# Patient Record
Sex: FEMALE | Race: WHITE | NOT HISPANIC OR LATINO | Employment: OTHER | URBAN - METROPOLITAN AREA
[De-identification: names, ages, dates, MRNs, and addresses within clinical notes are randomized per-mention and may not be internally consistent; named-entity substitution may affect disease eponyms.]

---

## 2023-02-05 PROBLEM — R94.31 ABNORMAL EKG: Status: ACTIVE | Noted: 2023-02-05

## 2023-02-05 PROBLEM — R60.9 EDEMA: Status: ACTIVE | Noted: 2023-02-05

## 2023-02-05 PROBLEM — E78.5 HYPERLIPIDEMIA: Status: ACTIVE | Noted: 2023-02-05

## 2023-02-05 PROBLEM — M25.561 RIGHT KNEE PAIN: Status: ACTIVE | Noted: 2023-02-05

## 2023-02-05 PROBLEM — K63.9 COLON WALL THICKENING: Status: ACTIVE | Noted: 2023-02-05

## 2023-02-05 PROBLEM — N18.31 CKD STAGE G3A/A1, GFR 45-59 AND ALBUMIN CREATININE RATIO <30 MG/G (MULTI): Status: ACTIVE | Noted: 2023-02-05

## 2023-02-05 PROBLEM — G89.29 CHRONIC LOW BACK PAIN: Status: ACTIVE | Noted: 2023-02-05

## 2023-02-05 PROBLEM — E55.9 VITAMIN D INSUFFICIENCY: Status: ACTIVE | Noted: 2023-02-05

## 2023-02-05 PROBLEM — L30.9 ECZEMA: Status: ACTIVE | Noted: 2023-02-05

## 2023-02-05 PROBLEM — J06.9 ACUTE URI: Status: ACTIVE | Noted: 2023-02-05

## 2023-02-05 PROBLEM — B19.20 HEPATITIS-C: Status: ACTIVE | Noted: 2023-02-05

## 2023-02-05 PROBLEM — M23.91 INTERNAL DERANGEMENT OF RIGHT KNEE: Status: ACTIVE | Noted: 2023-02-05

## 2023-02-05 PROBLEM — M17.11 PRIMARY OSTEOARTHRITIS OF RIGHT KNEE: Status: ACTIVE | Noted: 2023-02-05

## 2023-02-05 PROBLEM — S89.91XA INJURY OF RIGHT KNEE: Status: ACTIVE | Noted: 2023-02-05

## 2023-02-05 PROBLEM — M54.50 CHRONIC LOW BACK PAIN: Status: ACTIVE | Noted: 2023-02-05

## 2023-02-05 PROBLEM — C44.91 BASAL CELL CARCINOMA OF SKIN: Status: ACTIVE | Noted: 2023-02-05

## 2023-02-05 PROBLEM — J30.2 SEASONAL ALLERGIES: Status: ACTIVE | Noted: 2023-02-05

## 2023-02-05 PROBLEM — M19.041 ARTHRITIS OF HAND, RIGHT: Status: ACTIVE | Noted: 2023-02-05

## 2023-02-05 PROBLEM — I10 HYPERTENSION: Status: ACTIVE | Noted: 2023-02-05

## 2023-02-05 PROBLEM — M17.9 DJD (DEGENERATIVE JOINT DISEASE) OF KNEE: Status: ACTIVE | Noted: 2023-02-05

## 2023-02-05 PROBLEM — R05.9 COUGH: Status: ACTIVE | Noted: 2023-02-05

## 2023-02-05 RX ORDER — IBUPROFEN 600 MG/1
1 TABLET ORAL 3 TIMES DAILY PRN
COMMUNITY
Start: 2022-08-18 | End: 2023-11-21 | Stop reason: ALTCHOICE

## 2023-02-05 RX ORDER — CHOLECALCIFEROL (VITAMIN D3) 125 MCG
2 CAPSULE ORAL DAILY
COMMUNITY

## 2023-02-05 RX ORDER — AMLODIPINE BESYLATE 5 MG/1
1 TABLET ORAL DAILY
COMMUNITY
End: 2023-12-21 | Stop reason: SDUPTHER

## 2023-02-05 RX ORDER — GLUCOSA SU 2KCL/CHONDROITIN SU 500-400 MG
1 CAPSULE ORAL DAILY
COMMUNITY

## 2023-02-05 RX ORDER — CALCIUM CARBONATE 600 MG
1 TABLET ORAL DAILY
COMMUNITY

## 2023-02-05 RX ORDER — MULTIVITAMIN
1 TABLET ORAL DAILY
COMMUNITY
End: 2023-11-21 | Stop reason: ALTCHOICE

## 2023-02-05 RX ORDER — LISINOPRIL 20 MG/1
1 TABLET ORAL DAILY
COMMUNITY
End: 2023-03-27 | Stop reason: ALTCHOICE

## 2023-03-27 ENCOUNTER — OFFICE VISIT (OUTPATIENT)
Dept: PRIMARY CARE | Facility: CLINIC | Age: 78
End: 2023-03-27
Payer: COMMERCIAL

## 2023-03-27 VITALS
WEIGHT: 233.8 LBS | HEART RATE: 72 BPM | DIASTOLIC BLOOD PRESSURE: 82 MMHG | HEIGHT: 66 IN | BODY MASS INDEX: 37.57 KG/M2 | TEMPERATURE: 97.6 F | SYSTOLIC BLOOD PRESSURE: 150 MMHG

## 2023-03-27 DIAGNOSIS — Z12.31 ENCOUNTER FOR SCREENING MAMMOGRAM FOR BREAST CANCER: ICD-10-CM

## 2023-03-27 DIAGNOSIS — Z78.0 ASYMPTOMATIC MENOPAUSAL STATE: ICD-10-CM

## 2023-03-27 DIAGNOSIS — S89.91XS INJURY OF RIGHT KNEE, SEQUELA: ICD-10-CM

## 2023-03-27 DIAGNOSIS — Z00.00 ROUTINE GENERAL MEDICAL EXAMINATION AT HEALTH CARE FACILITY: Primary | ICD-10-CM

## 2023-03-27 DIAGNOSIS — I10 HYPERTENSION, UNSPECIFIED TYPE: ICD-10-CM

## 2023-03-27 PROBLEM — R53.81 PHYSICAL DEBILITY: Status: ACTIVE | Noted: 2023-03-27

## 2023-03-27 PROCEDURE — G0439 PPPS, SUBSEQ VISIT: HCPCS | Performed by: FAMILY MEDICINE

## 2023-03-27 PROCEDURE — 1036F TOBACCO NON-USER: CPT | Performed by: FAMILY MEDICINE

## 2023-03-27 PROCEDURE — 3079F DIAST BP 80-89 MM HG: CPT | Performed by: FAMILY MEDICINE

## 2023-03-27 PROCEDURE — 3077F SYST BP >= 140 MM HG: CPT | Performed by: FAMILY MEDICINE

## 2023-03-27 PROCEDURE — 1157F ADVNC CARE PLAN IN RCRD: CPT | Performed by: FAMILY MEDICINE

## 2023-03-27 PROCEDURE — 1160F RVW MEDS BY RX/DR IN RCRD: CPT | Performed by: FAMILY MEDICINE

## 2023-03-27 PROCEDURE — 1170F FXNL STATUS ASSESSED: CPT | Performed by: FAMILY MEDICINE

## 2023-03-27 PROCEDURE — 1159F MED LIST DOCD IN RCRD: CPT | Performed by: FAMILY MEDICINE

## 2023-03-27 RX ORDER — ETODOLAC 600 MG/1
600 TABLET, EXTENDED RELEASE ORAL DAILY
Qty: 14 TABLET | Refills: 0 | Status: SHIPPED | OUTPATIENT
Start: 2023-03-27 | End: 2023-04-10

## 2023-03-27 RX ORDER — LISINOPRIL 40 MG/1
40 TABLET ORAL DAILY
Qty: 90 TABLET | Refills: 3 | Status: SHIPPED | OUTPATIENT
Start: 2023-03-27 | End: 2023-12-28 | Stop reason: SDUPTHER

## 2023-03-27 ASSESSMENT — ACTIVITIES OF DAILY LIVING (ADL)
HEARING - LEFT EAR: FUNCTIONAL
BATHING: INDEPENDENT
WALKS IN HOME: INDEPENDENT
TOILETING: INDEPENDENT
PATIENT'S MEMORY ADEQUATE TO SAFELY COMPLETE DAILY ACTIVITIES?: YES
HEARING - RIGHT EAR: FUNCTIONAL
JUDGMENT_ADEQUATE_SAFELY_COMPLETE_DAILY_ACTIVITIES: YES
FEEDING YOURSELF: INDEPENDENT
DRESSING YOURSELF: INDEPENDENT
GROOMING: INDEPENDENT

## 2023-03-27 ASSESSMENT — PATIENT HEALTH QUESTIONNAIRE - PHQ9
2. FEELING DOWN, DEPRESSED OR HOPELESS: NOT AT ALL
1. LITTLE INTEREST OR PLEASURE IN DOING THINGS: NOT AT ALL
SUM OF ALL RESPONSES TO PHQ9 QUESTIONS 1 AND 2: 0

## 2023-03-27 NOTE — PROGRESS NOTES
"  Subjective   Reason for Visit: Dora Guillermo is an 78 y.o. female here for a Medicare Wellness visit.     Past Medical, Surgical, and Family History reviewed and updated in chart.    Reviewed all medications by prescribing practitioner or clinical pharmacist (such as prescriptions, OTCs, herbal therapies and supplements) and documented in the medical record.    End of February injured right knee, didn't fall, was doing ok until th night, walked a lot and bad pain w weight on it  Has apt w/ dr garcia Thursday    Will check at [employer] to see if tdap is covered        Patient Care Team:  Bell Martin DO as PCP - General  Bell Martin,  as PCP - [employer]vantage PCP  Bell Martin DO as PCP - [employer]vantage PCP         Objective   Vitals:  /82   Pulse 72   Temp 36.4 °C (97.6 °F)   Ht 1.676 m (5' 6\")   Wt 106 kg (233 lb 12.8 oz)   BMI 37.74 kg/m²       Physical Exam  Vitals reviewed.   Constitutional:       General: She is not in acute distress.     Appearance: Normal appearance.   HENT:      Head: Normocephalic and atraumatic.   Eyes:      General: No scleral icterus.     Conjunctiva/sclera: Conjunctivae normal.   Cardiovascular:      Rate and Rhythm: Normal rate and regular rhythm.      Heart sounds: No murmur heard.  Pulmonary:      Effort: Pulmonary effort is normal.      Breath sounds: No wheezing, rhonchi or rales.   Abdominal:      General: Bowel sounds are normal.      Palpations: Abdomen is soft.      Tenderness: There is no abdominal tenderness.   Musculoskeletal:      Right lower leg: No edema.      Left lower leg: No edema.   Skin:     General: Skin is warm and dry.      Findings: No rash.   Neurological:      General: No focal deficit present.      Mental Status: She is alert.      Gait: Gait abnormal.      Comments: Ambulating with a walker   Psychiatric:         Mood and Affect: Mood normal.         Behavior: Behavior normal.         Assessment/Plan "   Problem List Items Addressed This Visit          Circulatory    Hypertension    Relevant Medications    lisinopril 40 mg tablet       Musculoskeletal    Injury of right knee    Relevant Medications    etodolac XL (Lodine XL) 600 mg 24 hr tablet     Other Visit Diagnoses       Routine general medical examination at health care facility    -  Primary    Relevant Orders    1 Year Follow Up In [facility] - PCP - Wellness Exam    Asymptomatic menopausal state        Relevant Orders    XR DEXA bone density    Encounter for screening mammogram for breast cancer        Relevant Orders    BI mammo bilateral screening tomosynthesis

## 2023-03-27 NOTE — PROGRESS NOTES
"Subjective   Reason for Visit: Dora Guillermo is an 78 y.o. female here for a Medicare Wellness visit.     Past Medical, Surgical, and Family History reviewed and updated in chart.    Reviewed all medications by prescribing practitioner or clinical pharmacist (such as prescriptions, OTCs, herbal therapies and supplements) and documented in the medical record.    HPI    Patient Care Team:  Bell Martin DO as PCP - General  Bell Martin DO as PCP - [employer]vantage PCP  Bell Martin DO as PCP - [employer] PCP     Review of Systems    Objective   Vitals:  /82   Pulse 72   Temp 36.4 °C (97.6 °F)   Ht 1.676 m (5' 6\")   Wt 106 kg (233 lb 12.8 oz)   BMI 37.74 kg/m²       Physical Exam    Assessment/Plan   Problem List Items Addressed This Visit        Circulatory    Hypertension    Relevant Medications    lisinopril 40 mg tablet       Musculoskeletal    Injury of right knee    Relevant Medications    etodolac XL (Lodine XL) 600 mg 24 hr tablet   Other Visit Diagnoses     Routine general medical examination at health care facility    -  Primary    Relevant Orders    1 Year Follow Up In [facility] - PCP - Wellness Exam    1 Year Follow Up In [facility] - PCP - Wellness Exam    Asymptomatic menopausal state        Relevant Orders    XR DEXA bone density    Encounter for screening mammogram for breast cancer        Relevant Orders    BI mammo bilateral screening tomosynthesis             "

## 2023-04-06 ENCOUNTER — TELEPHONE (OUTPATIENT)
Dept: PRIMARY CARE | Facility: CLINIC | Age: 78
End: 2023-04-06
Payer: COMMERCIAL

## 2023-04-06 NOTE — TELEPHONE ENCOUNTER
Pt said that she recently had a mammogram and she is scheduled for her bone density next week but she is also going to be having an mri on her knee before surgery.  She is concerned that this is too much radiation and isnt sure if she should reschedule her bone density. Please advise.

## 2023-04-20 ENCOUNTER — TELEPHONE (OUTPATIENT)
Dept: PRIMARY CARE | Facility: CLINIC | Age: 78
End: 2023-04-20

## 2023-04-20 ENCOUNTER — OFFICE VISIT (OUTPATIENT)
Dept: PRIMARY CARE | Facility: CLINIC | Age: 78
End: 2023-04-20
Payer: COMMERCIAL

## 2023-04-20 VITALS
TEMPERATURE: 97.8 F | BODY MASS INDEX: 39 KG/M2 | HEART RATE: 78 BPM | WEIGHT: 241.6 LBS | DIASTOLIC BLOOD PRESSURE: 79 MMHG | SYSTOLIC BLOOD PRESSURE: 131 MMHG

## 2023-04-20 DIAGNOSIS — M17.11 OSTEOARTHRITIS OF RIGHT KNEE, UNSPECIFIED OSTEOARTHRITIS TYPE: ICD-10-CM

## 2023-04-20 DIAGNOSIS — Z01.818 PRE-OP EVALUATION: ICD-10-CM

## 2023-04-20 DIAGNOSIS — N18.31 CKD STAGE G3A/A1, GFR 45-59 AND ALBUMIN CREATININE RATIO <30 MG/G (MULTI): ICD-10-CM

## 2023-04-20 DIAGNOSIS — I10 PRIMARY HYPERTENSION: Primary | ICD-10-CM

## 2023-04-20 PROCEDURE — 1036F TOBACCO NON-USER: CPT | Performed by: FAMILY MEDICINE

## 2023-04-20 PROCEDURE — 1159F MED LIST DOCD IN RCRD: CPT | Performed by: FAMILY MEDICINE

## 2023-04-20 PROCEDURE — 3078F DIAST BP <80 MM HG: CPT | Performed by: FAMILY MEDICINE

## 2023-04-20 PROCEDURE — 1157F ADVNC CARE PLAN IN RCRD: CPT | Performed by: FAMILY MEDICINE

## 2023-04-20 PROCEDURE — 1160F RVW MEDS BY RX/DR IN RCRD: CPT | Performed by: FAMILY MEDICINE

## 2023-04-20 PROCEDURE — 99214 OFFICE O/P EST MOD 30 MIN: CPT | Performed by: FAMILY MEDICINE

## 2023-04-20 PROCEDURE — 3075F SYST BP GE 130 - 139MM HG: CPT | Performed by: FAMILY MEDICINE

## 2023-04-20 NOTE — TELEPHONE ENCOUNTER
----- Message from Bell Martin DO sent at 4/19/2023  4:47 PM EDT -----  Bone density shows osteopenia. This means there is definitely bone thinning but not bad enough hat you need a prescription medication for it yet. Can prevent this from getting worse by increasing physical activity and taking a calcium and vitamin D supplement if you are not already doing so.  Thanks,  Bell Martin      ----- Message -----  From: Mikayla [facility] - Radiology Results In  Sent: 4/14/2023   2:28 PM EDT  To: Bell Martin DO

## 2023-04-20 NOTE — PROGRESS NOTES
Pre-Op Evaluation  Dora Guillermo is a 78 y.o. female who presents to the office today for a preoperative consultation at the request of surgeon dr zamarripa who plans on performing right total knee arthroplasty on April 26. This consultation is requested for the specific conditions prompting preoperative evaluation (i.e. because of potential affect on operative risk): HTN, CKD. Planned anesthesia is general. The patient has the following known anesthesia issues:  none . Patient has a bleeding risk of: no recent abnormal bleeding. Patient does not have objections to receiving blood products if needed.    The following portions of the chart were reviewed this encounter and updated as appropriate:  Problems     Subjective    Dora Guillermo is a 78 y.o. female who presents for Pre-op Clearance (Clearance for surgery - r knee - total knee rpl/April 26th/Dr garcia).    Has had surgery, no hx of complications, no vomiting related to anesthesia  Nauseous with morphine  Recovering at home, help at home,   Ramp into home  Dtr and family live in next door home  In home PT planned or outpatient  Stopped naproxen 3 days ago  Taking otc tylenol         Objective   /79   Pulse 78   Temp 36.6 °C (97.8 °F)   Wt 110 kg (241 lb 9.6 oz)   BMI 39.00 kg/m²   See 4/13/labs  Physical Exam  Vitals reviewed.   Constitutional:       General: She is not in acute distress.     Appearance: Normal appearance.   HENT:      Head: Normocephalic and atraumatic.   Eyes:      General: No scleral icterus.     Conjunctiva/sclera: Conjunctivae normal.   Cardiovascular:      Rate and Rhythm: Normal rate and regular rhythm.      Heart sounds: No murmur heard.  Pulmonary:      Effort: Pulmonary effort is normal.      Breath sounds: No wheezing, rhonchi or rales.   Abdominal:      General: Bowel sounds are normal.      Palpations: Abdomen is soft.      Tenderness: There is no abdominal tenderness.   Musculoskeletal:      Right lower leg: No edema.       Left lower leg: No edema.   Skin:     General: Skin is warm and dry.      Findings: No rash.   Neurological:      General: No focal deficit present.      Mental Status: She is alert.      Gait: Gait normal.   Psychiatric:         Mood and Affect: Mood normal.         Behavior: Behavior normal.     Ambulating with wheeled walker    Assessment/Plan   Problem List Items Addressed This Visit          Circulatory    Hypertension - Primary       Genitourinary    CKD stage G3a/A1, GFR 45-59 and albumin creatinine ratio <30 mg/g       Musculoskeletal    DJD (degenerative joint disease) of knee     Other Visit Diagnoses       Pre-op evaluation            Acceptable/ Low surgical risk.  Patient advised to follow up as needed or if any concerns arise.

## 2023-04-25 LAB — SARS-COV-2 RESULT: NOT DETECTED

## 2023-04-26 ENCOUNTER — HOSPITAL ENCOUNTER (OUTPATIENT)
Dept: DATA CONVERSION | Facility: HOSPITAL | Age: 78
End: 2023-04-27
Attending: ORTHOPAEDIC SURGERY | Admitting: ORTHOPAEDIC SURGERY
Payer: COMMERCIAL

## 2023-04-26 DIAGNOSIS — E78.5 HYPERLIPIDEMIA, UNSPECIFIED: ICD-10-CM

## 2023-04-26 DIAGNOSIS — N18.9 CHRONIC KIDNEY DISEASE, UNSPECIFIED: ICD-10-CM

## 2023-04-26 DIAGNOSIS — Z86.19 PERSONAL HISTORY OF OTHER INFECTIOUS AND PARASITIC DISEASES: ICD-10-CM

## 2023-04-26 DIAGNOSIS — M17.11 UNILATERAL PRIMARY OSTEOARTHRITIS, RIGHT KNEE: ICD-10-CM

## 2023-04-26 DIAGNOSIS — J44.9 CHRONIC OBSTRUCTIVE PULMONARY DISEASE, UNSPECIFIED (MULTI): ICD-10-CM

## 2023-04-26 DIAGNOSIS — Z20.822 CONTACT WITH AND (SUSPECTED) EXPOSURE TO COVID-19: ICD-10-CM

## 2023-04-26 DIAGNOSIS — I12.9 HYPERTENSIVE CHRONIC KIDNEY DISEASE WITH STAGE 1 THROUGH STAGE 4 CHRONIC KIDNEY DISEASE, OR UNSPECIFIED CHRONIC KIDNEY DISEASE: ICD-10-CM

## 2023-04-26 DIAGNOSIS — M17.9 OSTEOARTHRITIS OF KNEE, UNSPECIFIED: ICD-10-CM

## 2023-04-27 LAB
BASOPHILS (10*3/UL) IN BLOOD BY AUTOMATED COUNT: 0.01 X10E9/L (ref 0–0.1)
BASOPHILS/100 LEUKOCYTES IN BLOOD BY AUTOMATED COUNT: 0.1 % (ref 0–2)
EOSINOPHILS (10*3/UL) IN BLOOD BY AUTOMATED COUNT: 0.02 X10E9/L (ref 0–0.4)
EOSINOPHILS/100 LEUKOCYTES IN BLOOD BY AUTOMATED COUNT: 0.2 % (ref 0–6)
ERYTHROCYTE DISTRIBUTION WIDTH (RATIO) BY AUTOMATED COUNT: 13.7 % (ref 11.5–14.5)
ERYTHROCYTE MEAN CORPUSCULAR HEMOGLOBIN CONCENTRATION (G/DL) BY AUTOMATED: 32.3 G/DL (ref 32–36)
ERYTHROCYTE MEAN CORPUSCULAR VOLUME (FL) BY AUTOMATED COUNT: 90 FL (ref 80–100)
ERYTHROCYTES (10*6/UL) IN BLOOD BY AUTOMATED COUNT: 3.63 X10E12/L (ref 4–5.2)
HEMATOCRIT (%) IN BLOOD BY AUTOMATED COUNT: 32.5 % (ref 36–46)
HEMOGLOBIN (G/DL) IN BLOOD: 10.5 G/DL (ref 12–16)
IMMATURE GRANULOCYTES/100 LEUKOCYTES IN BLOOD BY AUTOMATED COUNT: 0.3 % (ref 0–0.9)
LEUKOCYTES (10*3/UL) IN BLOOD BY AUTOMATED COUNT: 9.2 X10E9/L (ref 4.4–11.3)
LYMPHOCYTES (10*3/UL) IN BLOOD BY AUTOMATED COUNT: 1.06 X10E9/L (ref 0.8–3)
LYMPHOCYTES/100 LEUKOCYTES IN BLOOD BY AUTOMATED COUNT: 11.5 % (ref 13–44)
MONOCYTES (10*3/UL) IN BLOOD BY AUTOMATED COUNT: 1.24 X10E9/L (ref 0.05–0.8)
MONOCYTES/100 LEUKOCYTES IN BLOOD BY AUTOMATED COUNT: 13.4 % (ref 2–10)
NEUTROPHILS (10*3/UL) IN BLOOD BY AUTOMATED COUNT: 6.87 X10E9/L (ref 1.6–5.5)
NEUTROPHILS/100 LEUKOCYTES IN BLOOD BY AUTOMATED COUNT: 74.5 % (ref 40–80)
PLATELETS (10*3/UL) IN BLOOD AUTOMATED COUNT: 237 X10E9/L (ref 150–450)

## 2023-04-28 LAB
BASOPHILS (10*3/UL) IN BLOOD BY AUTOMATED COUNT: NORMAL
BASOPHILS/100 LEUKOCYTES IN BLOOD BY AUTOMATED COUNT: NORMAL
EOSINOPHILS (10*3/UL) IN BLOOD BY AUTOMATED COUNT: NORMAL
EOSINOPHILS/100 LEUKOCYTES IN BLOOD BY AUTOMATED COUNT: NORMAL
ERYTHROCYTE DISTRIBUTION WIDTH (RATIO) BY AUTOMATED COUNT: NORMAL
ERYTHROCYTE MEAN CORPUSCULAR HEMOGLOBIN CONCENTRATION (G/DL) BY AUTOMATED: NORMAL
ERYTHROCYTE MEAN CORPUSCULAR VOLUME (FL) BY AUTOMATED COUNT: NORMAL
ERYTHROCYTES (10*6/UL) IN BLOOD BY AUTOMATED COUNT: NORMAL
HEMATOCRIT (%) IN BLOOD BY AUTOMATED COUNT: NORMAL
HEMOGLOBIN (G/DL) IN BLOOD: NORMAL
IMMATURE GRANULOCYTES/100 LEUKOCYTES IN BLOOD BY AUTOMATED COUNT: NORMAL
LEUKOCYTES (10*3/UL) IN BLOOD BY AUTOMATED COUNT: NORMAL
LYMPHOCYTES (10*3/UL) IN BLOOD BY AUTOMATED COUNT: NORMAL
LYMPHOCYTES/100 LEUKOCYTES IN BLOOD BY AUTOMATED COUNT: NORMAL
MANUAL DIFFERENTIAL Y/N: NORMAL
MONOCYTES (10*3/UL) IN BLOOD BY AUTOMATED COUNT: NORMAL
MONOCYTES/100 LEUKOCYTES IN BLOOD BY AUTOMATED COUNT: NORMAL
NEUTROPHILS (10*3/UL) IN BLOOD BY AUTOMATED COUNT: NORMAL
NEUTROPHILS/100 LEUKOCYTES IN BLOOD BY AUTOMATED COUNT: NORMAL
NRBC (PER 100 WBCS) BY AUTOMATED COUNT: NORMAL
PLATELETS (10*3/UL) IN BLOOD AUTOMATED COUNT: NORMAL

## 2023-05-10 ENCOUNTER — TELEPHONE (OUTPATIENT)
Dept: PRIMARY CARE | Facility: CLINIC | Age: 78
End: 2023-05-10
Payer: COMMERCIAL

## 2023-05-10 NOTE — TELEPHONE ENCOUNTER
She lm that she had her knee replacement done and she is still as a board and asked if she can start taking aleve again//yv

## 2023-05-11 ENCOUNTER — TELEPHONE (OUTPATIENT)
Dept: PRIMARY CARE | Facility: CLINIC | Age: 78
End: 2023-05-11
Payer: COMMERCIAL

## 2023-05-11 NOTE — TELEPHONE ENCOUNTER
"Pt's dtr called and wanted to know if pt could take naproxen. She had knee surgery 4/26 recently and tylenol isn't helping. They called the surgeon and was told to \"take her meds as prescribed or contact their GP\" so they weren't clear on what that meant.  They do not need any called in. They have some. They just want to make sure it is okay for her to start taking it again. Please advise.     [phone]  "

## 2023-08-21 ENCOUNTER — OFFICE VISIT (OUTPATIENT)
Dept: PRIMARY CARE | Facility: CLINIC | Age: 78
End: 2023-08-21
Payer: COMMERCIAL

## 2023-08-21 ENCOUNTER — LAB (OUTPATIENT)
Dept: LAB | Facility: LAB | Age: 78
End: 2023-08-21
Payer: COMMERCIAL

## 2023-08-21 VITALS
OXYGEN SATURATION: 97 % | RESPIRATION RATE: 16 BRPM | BODY MASS INDEX: 39.37 KG/M2 | DIASTOLIC BLOOD PRESSURE: 70 MMHG | HEIGHT: 66 IN | WEIGHT: 245 LBS | SYSTOLIC BLOOD PRESSURE: 128 MMHG | HEART RATE: 63 BPM

## 2023-08-21 DIAGNOSIS — N18.31 CKD STAGE G3A/A1, GFR 45-59 AND ALBUMIN CREATININE RATIO <30 MG/G (MULTI): ICD-10-CM

## 2023-08-21 DIAGNOSIS — R53.83 FATIGUE, UNSPECIFIED TYPE: ICD-10-CM

## 2023-08-21 DIAGNOSIS — E78.2 MIXED HYPERLIPIDEMIA: ICD-10-CM

## 2023-08-21 DIAGNOSIS — E55.9 VITAMIN D DEFICIENCY: ICD-10-CM

## 2023-08-21 DIAGNOSIS — Z96.651 HISTORY OF TOTAL RIGHT KNEE REPLACEMENT: ICD-10-CM

## 2023-08-21 DIAGNOSIS — M17.11 PRIMARY OSTEOARTHRITIS OF RIGHT KNEE: ICD-10-CM

## 2023-08-21 DIAGNOSIS — M77.8 SUBACROMIAL TENDONITIS OF LEFT SHOULDER: Primary | ICD-10-CM

## 2023-08-21 DIAGNOSIS — I10 PRIMARY HYPERTENSION: ICD-10-CM

## 2023-08-21 DIAGNOSIS — M47.26 OSTEOARTHRITIS OF SPINE WITH RADICULOPATHY, LUMBAR REGION: ICD-10-CM

## 2023-08-21 PROBLEM — R94.31 ABNORMAL EKG: Status: RESOLVED | Noted: 2023-02-05 | Resolved: 2023-08-21

## 2023-08-21 PROBLEM — H52.01 HYPERMETROPIA OF RIGHT EYE: Status: ACTIVE | Noted: 2023-06-14

## 2023-08-21 PROBLEM — H52.203 ASTIGMATISM OF BOTH EYES: Status: ACTIVE | Noted: 2023-06-14

## 2023-08-21 PROBLEM — J06.9 ACUTE URI: Status: RESOLVED | Noted: 2023-02-05 | Resolved: 2023-08-21

## 2023-08-21 PROBLEM — R05.9 COUGH: Status: RESOLVED | Noted: 2023-02-05 | Resolved: 2023-08-21

## 2023-08-21 PROBLEM — H25.13 AGE-RELATED NUCLEAR CATARACT OF BOTH EYES: Status: ACTIVE | Noted: 2023-06-14

## 2023-08-21 PROBLEM — H04.123 DRY EYE SYNDROME OF BOTH EYES: Status: ACTIVE | Noted: 2023-06-14

## 2023-08-21 PROBLEM — M25.561 RIGHT KNEE PAIN: Status: RESOLVED | Noted: 2023-02-05 | Resolved: 2023-08-21

## 2023-08-21 PROBLEM — H52.4 PRESBYOPIA: Status: ACTIVE | Noted: 2023-06-14

## 2023-08-21 PROBLEM — H52.12 MYOPIA OF LEFT EYE: Status: ACTIVE | Noted: 2023-06-14

## 2023-08-21 PROBLEM — S89.91XA INJURY OF RIGHT KNEE: Status: RESOLVED | Noted: 2023-02-05 | Resolved: 2023-08-21

## 2023-08-21 PROBLEM — H40.003 GLAUCOMA SUSPECT OF BOTH EYES: Status: ACTIVE | Noted: 2023-06-14

## 2023-08-21 PROBLEM — M47.816 OSTEOARTHRITIS OF LUMBAR SPINE: Status: ACTIVE | Noted: 2023-08-21

## 2023-08-21 LAB
ALANINE AMINOTRANSFERASE (SGPT) (U/L) IN SER/PLAS: 21 U/L (ref 7–45)
ALBUMIN (G/DL) IN SER/PLAS: 4.6 G/DL (ref 3.4–5)
ALKALINE PHOSPHATASE (U/L) IN SER/PLAS: 53 U/L (ref 33–136)
ANION GAP IN SER/PLAS: 12 MMOL/L (ref 10–20)
ASPARTATE AMINOTRANSFERASE (SGOT) (U/L) IN SER/PLAS: 22 U/L (ref 9–39)
BASOPHILS (10*3/UL) IN BLOOD BY AUTOMATED COUNT: 0.05 X10E9/L (ref 0–0.1)
BASOPHILS/100 LEUKOCYTES IN BLOOD BY AUTOMATED COUNT: 1.1 % (ref 0–2)
BILIRUBIN TOTAL (MG/DL) IN SER/PLAS: 0.6 MG/DL (ref 0–1.2)
CALCIDIOL (25 OH VITAMIN D3) (NG/ML) IN SER/PLAS: 52 NG/ML
CALCIUM (MG/DL) IN SER/PLAS: 9.6 MG/DL (ref 8.6–10.3)
CARBON DIOXIDE, TOTAL (MMOL/L) IN SER/PLAS: 27 MMOL/L (ref 21–32)
CHLORIDE (MMOL/L) IN SER/PLAS: 103 MMOL/L (ref 98–107)
CHOLESTEROL (MG/DL) IN SER/PLAS: 214 MG/DL (ref 0–199)
CHOLESTEROL IN HDL (MG/DL) IN SER/PLAS: 65.7 MG/DL
CHOLESTEROL/HDL RATIO: 3.3
CREATININE (MG/DL) IN SER/PLAS: 0.94 MG/DL (ref 0.5–1.05)
EOSINOPHILS (10*3/UL) IN BLOOD BY AUTOMATED COUNT: 0.43 X10E9/L (ref 0–0.4)
EOSINOPHILS/100 LEUKOCYTES IN BLOOD BY AUTOMATED COUNT: 9.1 % (ref 0–6)
ERYTHROCYTE DISTRIBUTION WIDTH (RATIO) BY AUTOMATED COUNT: 14.6 % (ref 11.5–14.5)
ERYTHROCYTE MEAN CORPUSCULAR HEMOGLOBIN CONCENTRATION (G/DL) BY AUTOMATED: 31.7 G/DL (ref 32–36)
ERYTHROCYTE MEAN CORPUSCULAR VOLUME (FL) BY AUTOMATED COUNT: 89 FL (ref 80–100)
ERYTHROCYTES (10*6/UL) IN BLOOD BY AUTOMATED COUNT: 4.62 X10E12/L (ref 4–5.2)
GFR FEMALE: 62 ML/MIN/1.73M2
GLUCOSE (MG/DL) IN SER/PLAS: 90 MG/DL (ref 74–99)
HEMATOCRIT (%) IN BLOOD BY AUTOMATED COUNT: 41 % (ref 36–46)
HEMOGLOBIN (G/DL) IN BLOOD: 13 G/DL (ref 12–16)
IMMATURE GRANULOCYTES/100 LEUKOCYTES IN BLOOD BY AUTOMATED COUNT: 0.6 % (ref 0–0.9)
LDL: 122 MG/DL (ref 0–99)
LEUKOCYTES (10*3/UL) IN BLOOD BY AUTOMATED COUNT: 4.7 X10E9/L (ref 4.4–11.3)
LYMPHOCYTES (10*3/UL) IN BLOOD BY AUTOMATED COUNT: 1.39 X10E9/L (ref 0.8–3)
LYMPHOCYTES/100 LEUKOCYTES IN BLOOD BY AUTOMATED COUNT: 29.5 % (ref 13–44)
MONOCYTES (10*3/UL) IN BLOOD BY AUTOMATED COUNT: 0.6 X10E9/L (ref 0.05–0.8)
MONOCYTES/100 LEUKOCYTES IN BLOOD BY AUTOMATED COUNT: 12.7 % (ref 2–10)
NEUTROPHILS (10*3/UL) IN BLOOD BY AUTOMATED COUNT: 2.21 X10E9/L (ref 1.6–5.5)
NEUTROPHILS/100 LEUKOCYTES IN BLOOD BY AUTOMATED COUNT: 47 % (ref 40–80)
PLATELETS (10*3/UL) IN BLOOD AUTOMATED COUNT: 262 X10E9/L (ref 150–450)
POTASSIUM (MMOL/L) IN SER/PLAS: 4.3 MMOL/L (ref 3.5–5.3)
PROTEIN TOTAL: 7.2 G/DL (ref 6.4–8.2)
SODIUM (MMOL/L) IN SER/PLAS: 138 MMOL/L (ref 136–145)
THYROTROPIN (MIU/L) IN SER/PLAS BY DETECTION LIMIT <= 0.05 MIU/L: 2.33 MIU/L (ref 0.44–3.98)
TRIGLYCERIDE (MG/DL) IN SER/PLAS: 133 MG/DL (ref 0–149)
UREA NITROGEN (MG/DL) IN SER/PLAS: 25 MG/DL (ref 6–23)
VLDL: 27 MG/DL (ref 0–40)

## 2023-08-21 PROCEDURE — 80053 COMPREHEN METABOLIC PANEL: CPT

## 2023-08-21 PROCEDURE — 36415 COLL VENOUS BLD VENIPUNCTURE: CPT

## 2023-08-21 PROCEDURE — 3078F DIAST BP <80 MM HG: CPT | Performed by: FAMILY MEDICINE

## 2023-08-21 PROCEDURE — 1036F TOBACCO NON-USER: CPT | Performed by: FAMILY MEDICINE

## 2023-08-21 PROCEDURE — 84443 ASSAY THYROID STIM HORMONE: CPT

## 2023-08-21 PROCEDURE — 85025 COMPLETE CBC W/AUTO DIFF WBC: CPT

## 2023-08-21 PROCEDURE — 82306 VITAMIN D 25 HYDROXY: CPT

## 2023-08-21 PROCEDURE — 1157F ADVNC CARE PLAN IN RCRD: CPT | Performed by: FAMILY MEDICINE

## 2023-08-21 PROCEDURE — 3074F SYST BP LT 130 MM HG: CPT | Performed by: FAMILY MEDICINE

## 2023-08-21 PROCEDURE — 80061 LIPID PANEL: CPT

## 2023-08-21 PROCEDURE — 1160F RVW MEDS BY RX/DR IN RCRD: CPT | Performed by: FAMILY MEDICINE

## 2023-08-21 PROCEDURE — 1159F MED LIST DOCD IN RCRD: CPT | Performed by: FAMILY MEDICINE

## 2023-08-21 PROCEDURE — 99214 OFFICE O/P EST MOD 30 MIN: CPT | Performed by: FAMILY MEDICINE

## 2023-08-21 NOTE — PROGRESS NOTES
Subjective   Patient ID: Dora Guillermo is a 78 y.o. female who presents for Establish Care.    Pt presents today to establish care. Pt states she has no other concerns.          Review of Systems  12 Systems have been reviewed as follows.  Constitutional: Fever, weight gain, weight loss, appetite change, night sweats, fatigue, chills.  Eyes : blurry, double vision, vision, loss, tearing, redness, pain, sensitivity to light, glaucoma.  Ears, nose, mouth, and throat: Hearing loss, ringing in the ears, ear pain, nasal congestion, nasal drainage, nosebleeds, mouth, throat, irritation tooth problem.  Cardiovascular :chest pain, pressure, heart racing, palpitations, sweating, leg swelling, high or low blood pressure  Pulmonary: Cough, yellow or green sputum, blood and sputum, shortness of breath, wheezing  Gastrointestinal: Nausea, vomiting, diarrhea, constipation, pain, blood in stool, or vomitus, heartburn, difficulty swallowing  Genitourinary: incontinence, abnormal bleeding, abnormal discharge, urinary frequency, urinary hesitancy, pain, impotence sexual problem, infection, urinary retention  Musculoskeletal: Pain, stiffness, joint, redness or warmth, arthritis, back pain, weakness, muscle wasting, sprain or fracture  Neuro: Weight weakness, dizziness, change in voice, change in taste change in vision, change in hearing, loss, or change of sensation, trouble walking, balance problems coordination problems, shaking, speech problem  Endocrine , cold or heat intolerance, blood sugar problem, weight gain or loss missed periods hot flashes, sweats, change in body hair, change in libido, increased thirst, increased urination  Heme/lymph: Swelling, bleeding, problem anemia, bruising, enlarged lymph nodes  Allergic/immunologic: H. plus nasal drip, watery itchy eyes, nasal drainage, immunosuppressed  The above were reviewed and noted negative except as noted in HPI and Problem List.    Objective   /70 (BP Location: Right  "arm, Patient Position: Sitting, BP Cuff Size: Large adult)   Pulse 63   Resp 16   Ht 1.676 m (5' 6\")   Wt 111 kg (245 lb)   SpO2 97%   BMI 39.54 kg/m²     Physical Exam  Constitutional: Well developed, well nourished, alert and in no acute distress   Eyes: Normal external exam. Pupils equally round and reactive to light with normal accommodation and extraocular movements intact.  Neck: Supple, no lymphadenopathy or masses.   Cardiovascular: Regular rate and rhythm, normal S1 and S2, no murmurs, gallops, or rubs. Radial pulses normal. No peripheral edema.  Pulmonary: No respiratory distress, lungs clear to auscultation bilaterally. No wheezes, rhonchi, rales.  Abdomen: soft,non tender, non distended, without masses or HSM  Skin: Warm, well perfused, normal skin turgor and color.   Neurologic: Cranial nerves II-XII grossly intact.   Psychiatric: Mood calm and affect normal  Musculoskeletal: Moving all extremities without restriction    Assessment/Plan   Problem List Items Addressed This Visit       CKD stage G3a/A1, GFR 45-59 and albumin creatinine ratio <30 mg/g (CMS/[facility])    Relevant Orders    Follow Up In [facility] - PCP - Established    Hyperlipidemia    Relevant Orders    Follow Up In [facility] - PCP - Established    Lipid Panel    Hypertension    Relevant Orders    Follow Up In [facility] - PCP - Established    CBC and Auto Differential    Comprehensive Metabolic Panel    Primary osteoarthritis of right knee    Relevant Orders    Follow Up In [facility] - PCP - Established    History of total right knee replacement    Relevant Orders    Follow Up In [facility] - PCP - Established    Osteoarthritis of lumbar spine    Relevant Orders    Follow Up In [facility] - PCP - Established     Other Visit Diagnoses       Subacromial tendonitis of left shoulder    -  Primary    Vitamin D deficiency        Relevant Orders    Follow Up In [facility] - PCP - Established    Vitamin D, Total    Fatigue, unspecified type        Relevant Orders    Follow Up In [facility] - PCP - Established    Thyroid Stimulating Hormone               Continue current medications and therapy for chronic medical conditions      Provider Attestation - Scribe documentation    All medical record entries made by the Scribe were at my direction and personally dictated by me. I have reviewed the chart and agree that the record accurately reflects my personal performance of the history, physical exam, discussion and plan.      Scribe Attestation  By signing my name below, I, Bassam Zafar MD   , Scribe   attest that this documentation has been prepared under the direction and in the presence of Bassam Zafar MD.      BW prior to next visit    PT & ice L shoulder

## 2023-09-07 VITALS — DIASTOLIC BLOOD PRESSURE: 55 MMHG | SYSTOLIC BLOOD PRESSURE: 101 MMHG

## 2023-09-14 NOTE — DISCHARGE SUMMARY
Send Summary:   Discharge Summary Providers:  Provider Role Provider Name   · Attending Brian Osborne   · Referring Bell Martin   · Primary Bell Martin       Note Recipients: Bell Martin DO - [phone] [Preferred]  Brian Osborne MD       Discharge:    Summary:   Admission Date: .26-Apr-2023 05:44:00   Discharge Date: 27-Apr-2023   Attending Physician at Discharge: Brian Osborne   Admission Reason: Knee pain   Final Discharge Diagnoses: Osteoarthritis of knee   Procedures: Date: 26-Apr-2023 09:47:00  Procedure Name: 1. RIGHT TOTAL KNEE ARTHROPLASTY   Condition at Discharge: Satisfactory   Disposition at Discharge: Home Health Care - New   Vital Signs:        T   P  R  BP   MAP  SpO2   Value  37.8  65  20  138/64   92  95%  Date/Time 4/27 7:39 4/27 7:39 4/27 7:39 4/27 7:39  4/27 7:39 4/27 7:39  Range  (36.3C - 37.8C )  (65 - 77 )  (14 - 20 )  (115 - 147 )/ (56 - 65 )  (81 - 92 )  (93% - 96% )  Highest temp of 37.8 C was recorded at 4/27 7:39    Date:            Weight/Scale Type:  Height:   26-Apr-2023 06:14  106.6  kg / standing       Hospital Course:    78-year-old female came the office complaining of right knee pain.  After discussing risk versus benefits was brought in for right total knee replacement.  Tolerated  the procedure well.  Plan to discharge home with home physical therapy    Follow with Dr. HENRY BOLIVAR in 2 weeks for wound check  Weight bearing as tolerated  May shower allowing water to run over incision and pat dry. DO NOT wash or scrub incision  May shower with Aquacel over incision  Remove Aquacel 5/3/2023  Incentive spirometry 10 times every hour while awake for 2 weeks  Surgical hose for 3 weeks removing only for skin care and hygiene  Ice to right knee for 20 minutes every hour  If incision begins to drain call office immediately      Discharge Information:    and Continuing Care:   Lab Results - Pending:    None  Radiology Results - Pending: None   Discharge Instructions:    Activity:            activity as tolerated.          May shower..            May not return to school/work until follow-up visit with.            May not drive until follow-up visit.            No pushing, pulling, or lifting objects greater than 5 pounds.            Weight-bearing Instructions: weight-bearing as tolerated right leg.            May shower allowing water to run over incision and pat dry. DO NOT wash or scrub incision    Nutrition/Diet:           resume normal diet    Wound Care:           Wound Site:   Right knee          Wound Type:   surgical incision          Change Dressing:   Remove Aquacel like a Band-Aid on 5/3/2023          Instructions:   no lotions, creams, or tub soaks    Additional Orders:           Additional Instructions:   Follow with Dr. HENRY BOLIVAR in 2 weeks for wound check    Weight bearing as tolerated    May shower allowing water to run over incision and pat dry. DO NOT wash or scrub incision    May shower with Aquacel over incision    Remove Aquacel 5/3/2023  Incentive spirometry 10 times every hour while awake for 2 weeks    Surgical hose for 3 weeks removing only for skin care and hygiene    Ice to right knee for 20 minutes every hour    If incision begins to drain call office immediately    Home Care Certification:           Home Care Agency:    [facility] [phone]          Skilled Disciplines Ordered:   PT,  OT    Home Care Services:           Home Care Skilled Service:   Rehab (PT/OT/SP eval and treat)    Follow Up Appointments:    Follow-Up Appointment 01:           Physician/Dept/Service:   Dr. HENRY BOLIVAR as scheduled          Reason for Referral:   Right knee          Call to Schedule in:   2 weeks          Location:   [address] [facility]          Phone Number:   [phone]    Discharge Medications: Home Medication   amLODIPine 5 mg oral tablet - 1 tab(s) orally once a day  calcium - 600 milligram(s) orally once a day  multiple vitamin tabs - 1 tab(s) orally once a  day  Omega-3 1000 mg oral capsule - 1 cap(s) orally 2 times a day  Vitamin D3 - 1 tab(s) orally once a day  Probiotic Formula oral capsule - 1 cap(s) orally once a day  lisinopril 40 mg oral tablet - 1 tab(s) orally once a day  okay to resume on 4/29/23  aspirin 81 mg oral delayed release tablet - 1 tab(s) orally 2 times a day x 30 days      PRN Medication   docusate sodium 100 mg oral capsule - 1 cap(s) orally 2 times a day x 30 days, As Needed for constipation  methocarbamol 500 mg oral tablet - 1 tab(s) orally every 8 hours x 7 days, As Needed   traMADol 50 mg oral tablet - 1 tab(s) orally every 6 hours x 7 days, As Needed   ondansetron 4 mg oral tablet, disintegrating - 1 tab(s) orally every 8 hours x 7 days, As Needed   acetaminophen 500 mg oral tablet - 2 tab(s) orally every 6 hours, As Needed     DNR Status:   ·  Code Status Code Status order at time of discharge: Full Code       Electronic Signatures:  Steve Marquis (APRN-CNP)  (Signed 27-Apr-2023 09:24)   Authored: Send Summary, Summary Content, Ongoing Care,  DNR Status, Note Completion      Last Updated: 27-Apr-2023 09:24 by Steve Marquis (APRN-CNP)

## 2023-09-14 NOTE — H&P
History & Physical Reviewed:   I have reviewed the History and Physical dated:  20-Apr-2023   History and Physical reviewed and relevant findings noted. Patient examined to review pertinent physical  findings.: No significant changes   Home Medications Reviewed: no changes noted   Allergies Reviewed: no changes noted     Consent:   COVID-19 Consent:  ·  COVID-19 Risk Consent Surgeon has reviewed key risks related to the risk of donnell COVID-19 and if they contract COVID-19 what the risks are.       Electronic Signatures:  Brian Osborne)  (Signed 26-Apr-2023 07:28)   Authored: History & Physical Reviewed, Consent, Note  Completion      Last Updated: 26-Apr-2023 07:28 by Brian Osborne)

## 2023-09-14 NOTE — PROGRESS NOTES
Service: Orthopaedics     Subjective Data:   JOSE SANCHEZ is a 78 year old Female who is Hospital Day # 2 and POD #1 for 1. RIGHT TOTAL KNEE ARTHROPLASTY;    Overnight Events: Patient had an uneventful night.     Objective Data:     Objective Information:      T   P  R  BP   MAP  SpO2   Value  37.8  65  20  138/64   92  95%  Date/Time 4/27 7:39 4/27 7:39 4/27 7:39 4/27 7:39  4/27 7:39 4/27 7:39  Range  (36.3C - 37.8C )  (65 - 77 )  (14 - 20 )  (115 - 147 )/ (56 - 65 )  (81 - 92 )  (93% - 96% )  Highest temp of 37.8 C was recorded at 4/27 7:39      Pain reported at 4/27 1:59: 1 = Mild      T   P  R  BP   MAP  SpO2   Value  37.8  65  20  138/64   92  95%  Date/Time 4/27 7:39 4/27 7:39 4/27 7:39 4/27 7:39  4/27 7:39 4/27 7:39  Range  (36.3C - 37.8C )  (65 - 77 )  (14 - 20 )  (115 - 147 )/ (56 - 65 )  (81 - 92 )  (93% - 96% )  Highest temp of 37.8 C was recorded at 4/27 7:39        Pain reported at 4/27 1:59: 1 = Mild    Physical Exam by System:    Eyes: PERRL, EOMI, clear sclera   ENMT: mucous membranes moist, no apparent injury,  no lesions seen   Head/Neck: Neck supple, no apparent injury, thyroid  without mass or tenderness, No JVD, trachea midline, no bruits   Respiratory/Thorax: Patent airways, CTAB, normal  breath sounds with good chest expansion, thorax symmetric   Cardiovascular: Regular, rate and rhythm, no murmurs,  2+ equal pulses of the extremities, normal S 1and S 2   Gastrointestinal: Nondistended, soft, non-tender,  no rebound tenderness or guarding, no masses palpable, no organomegaly, +BS, no bruits   Extremities: Right knee dressing clean, dry, intact  Good sensation good capillary refill  2+ pulses   Neurological: alert and oriented x3, intact senses,  motor, response and reflexes, normal strength   Skin: Warm and dry, no lesions, no rashes     Medication:    Medications:          Continuous Medications       --------------------------------    1. Lactated Ringers Infusion:  1000  mL   IntraVenous  <Continuous>    2. Lactated Ringers Infusion:  1000  mL  IntraVenous  <Continuous>         Scheduled Medications       --------------------------------    1. amLODIPine (NORVASC):  5  mg  Oral  Daily    2. Aspirin Enteric Coated:  81  mg  Oral  2 Times a Day    3. Docusate:  100  mg  Oral  2 Times a Day    4. Polyethylene Glycol:  17  gram(s)  Oral  2 Times a Day         PRN Medications       --------------------------------    1. Cyclobenzaprine:  10  mg  Oral  3 Times a Day    2. diphenhydrAMINE:  25  mg  Oral  Every 6 Hours    3. HYDROcodone 10 mg - Acetaminophen 325 m  tablet(s)  Oral  Every 4 Hours    4. HYDROcodone 5 mg - Acetaminophen 325 m  tablet(s)  Oral  Every 4 Hours    5. HYDROmorphone Injectable:  0.4  mg  IntraVenous Push  Every 2 Hours    6. Ketorolac Injectable:  15  mg  IntraVenous Push  Every 6 Hours    7. Ondansetron Injectable:  4  mg  IntraVenous Push  Every 6 Hours    8. traMADol:  50  mg  Oral  Every 6 Hours        Recent Lab Results:    Results:    CBC: 2023 05:38              \     Hgb     /                              \     10.5 L    /  WBC  ----------------  Plt               9.2       ----------------    237              /     Hct     \                              /     32.5 L    \            RBC: 3.63 L    MCV: 90     Neutrophil %: 74.5      I have reviewed these laboratory results:    Complete Blood Count + Differential  2023 05:38:00      Result Value    White Blood Cell Count  9.2    Red Blood Cell Count  3.63   L   HGB  10.5   L   HCT  32.5   L   MCV  90    MCHC  32.3    PLT  237    RDW-CV  13.7    Neutrophil %  74.5    Immature Granulocytes %  0.3    Lymphocyte %  11.5    Monocyte %  13.4    Eosinophil %  0.2    Basophil %  0.1    Neutrophil Count  6.87   H   Lymphocyte Count  1.06    Monocyte Count  1.24   H   Eosinophil Count  0.02    Basophil Count  0.01        Assessment and Plan:   Code Status:  ·  Code Status Full Code     Advance Care  Planning:  Advance Care Planning: I evaluated the patient  and determined the patient's capacity to understand the risks, benefits and alternatives to treatment. I elicited the patient's goals for treatment and reviewed advance directives and medical orders for life sustaining treatment. The patient was given  an opportunity to review a blank advance directive as appropriate.     Assessment:    Subjective: No acute events overnight. Pain controlled. Denies chest pain/shortness of breath. Patient sitting up in chair she states she feels really good she  is looking forward to working with physical therapy wants to go home today    Objective:Vital signs stable.  Right knee dressing clean, dry, intact good sensation capillary refill  Vitals reviewed    No acute distress, breathing comfortably  Operative extremity: Dressing clean/dry/intact. Motor and sensory intact distally. Calves soft and non-tender. Toes warm and perfused.    Impression: s/p TKAPostop day 1    Plan:   1. Pain control  2. WBAT  3. PT/OT  4. DVT prophylaxisAspirin 81 mg p.o. twice daily for 30 days  5. Encourage incentive spirometry  6. Appreciate Hospitalist medical management  7. Discharge planning home with home physical therapy      Electronic Signatures:  Steve Marquis (APRN-CNP)   (Signed 27-Apr-2023 07:51)   Authored: Service, Subjective Data, Objective Data, Assessment and Plan, Note Completion  Nitin Rao)   (Signed 27-Apr-2023 12:28)   Co-Signer: Service, Subjective Data, Objective Data, Assessment and Plan, Note Completion    Last Updated: 27-Apr-2023 12:28 by Nitin Rao)

## 2023-09-21 RX ORDER — HYDROCODONE BITARTRATE AND ACETAMINOPHEN 5; 325 MG/1; MG/1
1 TABLET ORAL EVERY 6 HOURS PRN
COMMUNITY
End: 2023-11-21 | Stop reason: ALTCHOICE

## 2023-09-21 RX ORDER — TRAMADOL HYDROCHLORIDE 50 MG/1
50 TABLET ORAL EVERY 8 HOURS PRN
COMMUNITY
Start: 2023-04-27 | End: 2023-11-21 | Stop reason: ALTCHOICE

## 2023-09-27 ENCOUNTER — APPOINTMENT (OUTPATIENT)
Dept: PRIMARY CARE | Facility: CLINIC | Age: 78
End: 2023-09-27
Payer: COMMERCIAL

## 2023-10-02 NOTE — OP NOTE
PROCEDURE DETAILS    Preoperative Diagnosis:  Osteoarthritis of right knee, M17.11    Postoperative Diagnosis:  Osteoarthritis of right knee, M17.11    Surgeon: Brian Osborne  Resident/Fellow/Other Assistant: Carson Ge    Procedure:  1. RIGHT TOTAL KNEE ARTHROPLASTY    Anesthesia: Spinal with adductor canal block  Estimated Blood Loss: 50 cc  Findings: Osteoarthrosis right knee  Specimens(s) Collected: no,     Complications: None        Operative Report:   Indications:    The patient is here for a right total knee arthroplasty.  They have failed conservative treatment.  Total knee arthroplasty was offered.  The procedure was explained along with the risks, benefits and alternatives being reviewed.  Potential risks including  but not limited to: Infection, neurovascular complication, loosening, wear, DVT, instability as well as the potential need for reoperation and/or revision surgery were all discussed with the patient and they consented to the procedure.    Findings:    Osteoarthrosis of the right knee    Components: [employer] triathlon    Femur size: 4 CR  Tibial size: 3  Tibial insert size: 9 mm CS  Patella size: 32 mm    Operative procedure:    The patient was brought to the operative suite and  a spinal anesthesia was administered per the anesthesia team.  The patient was then placed in the supine position.  All bony prominences were well-padded.  A tourniquet was placed on the left proximally  over web roll.  A bump was placed under the hip on the operative side.  The lower extremity was then sterilely prepped with ChloraPrep then sterilely draped in usual manner.  A timeout was performed.  The patient was identified, the site and laterality  confirmed as was the administration of antibiotics confirmed.  Tranexamic acid was given orally prior in the holding area.    I began by exsanguinating the right lower extremity and inflated the tourniquet to 250 mmHg.  I then identified landmarks.  I made an  incision anteriorly over the knee for a standard approach for total knee arthroplasty.  I used careful dissection to  the subcutaneous and fatty tissues.  Identified the quadriceps tendon, medial retinaculum and peritenon over the patellar tendon.  I entered the knee joint by incising through the quadriceps tendon, medial retinaculum and down to the bone of the proximal  tibia medial to the patellar tendon.  I performed my medial release for sharply with a knife then cleaned completed this with an elevator.  I undermined the soft tissues behind the patellar tendon.  I then debrided the synovium from the suprapatellar  pouch.  I then everted the patella and brought the knee up into flexion.  I debrided the meniscus partially as well as the ACL.    At that point I placed the femoral and the tibial arrays for the Serstech system.  I then placed the femoral and tibial referencing points.  Next I rotated at the hip.  I then obtained my checkpoints on the medial and lateral malleolus I then on the femur  and tibia respectively.  Once I had obtained my checkpoints and the remaining osteophytes were debrided.  I placed the knee into extension.  Using shims I was able to obtain my planned degree of extension.  I planned 19 mm in extension.  I then flexed  the knee to 90 degrees and placed the shims.  I planned on approximately 19 mm in flexion.  I was able to obtain this.    At that point using the Dudley robot I made my tibial cut followed by my femoral cuts in standard fashion.  I removed the bone.  I debrided my posterior condyles and any remaining meniscus.  At that point I placed the trial size 3 tibia with a 9 mm CS insert  and the size 4 femur.  I brought the knee out into extension as well as flexed.  Taking knee through range of motion I had excellent soft tissue tension and achieved my planned gaps in both extension as well as flexion.  This was confirmed with the readings  off the Dudley arrays.  I had excellent  stability.    At that point I made a freehand patellar cut.  I decided on a size 32 mm patella.  I drilled for the patellar pegs.  Next I drilled the lugs on the femur.  I removed the arrays and checkpoints along with the trial instruments with the exception of the  tibial tray which I had pinned in place.  I was satisfied with my tibial rotation.  I then prepared the tibia for a demented component.  The trial instruments were removed.  The cement was prepared, while the cement was being prepared I then debrided  my gutters, medially, laterally and posteriorly.  I infiltrated the soft tissues posteriorly with a mixture of ropivacaine with epinephrine, clonidine and Toradol.    When the cement was ready I then impacted my tibial component.  Excess cement was debrided with a freer elevator.  I placed the trial tibial insert.  I then impacted my femoral component with the excess cement being debrided with a freer elevator..  I  then placed the patellar component and clamped this into place cement being debrided with a freer elevator.  Once the cement had hardened I looked for any overhanging cement and debrided with an osteotome and a mallet.  I again took the knee through range  of motion and was satisfied with my stability and patellar tracking.  Move the trial tibial insert and then impacted the permanent 9 mm CS tibial insert.    The tourniquet was released.  Hemostasis was maintained with gentle pressure as well as electrocautery.    Attention was turned to closure.  I thoroughly copiously irrigated with pulsatile lavage as well as with irrisept.  The quadriceps tendon medial retinaculum and proximal tibia tissues reapproximated with Ethibond suture.  The PA then closed the fatty  tissues with 0 Vicryl.  Subcutaneous closure was with 2-0 Vicryl.  The skin was closed with a Monocryl subcuticular stitch.  An Aquacel dressing was applied.    Patient procedure well, there are no apparent complications.    Estimated  blood loss: 50 cc    The patient was taken in stable condition to the PACU.    The physician assistant was present for the entire case.  Given the nature of the procedure and disease process a skilled surgical assistant was  necessary for the case.  The assistant was necessary for retraction and helped directly facilitate completion of the surgery.  A certified scrub tech was at the back table managing instruments and supplies for the surgical procedure.                        Attestation:   Note Completion:  Attending Attestation I performed the procedure without a resident         Electronic Signatures:  Brian Osborne)  (Signed 26-Apr-2023 09:54)   Authored: Post-Operative Note, Chart Review, Note Completion      Last Updated: 26-Apr-2023 09:54 by Brian Osborne)

## 2023-10-20 ENCOUNTER — OFFICE VISIT (OUTPATIENT)
Dept: ORTHOPEDIC SURGERY | Facility: CLINIC | Age: 78
End: 2023-10-20
Payer: COMMERCIAL

## 2023-10-20 ENCOUNTER — ANCILLARY PROCEDURE (OUTPATIENT)
Dept: RADIOLOGY | Facility: CLINIC | Age: 78
End: 2023-10-20
Payer: COMMERCIAL

## 2023-10-20 DIAGNOSIS — M17.11 PRIMARY OSTEOARTHRITIS OF RIGHT KNEE: ICD-10-CM

## 2023-10-20 PROCEDURE — 99213 OFFICE O/P EST LOW 20 MIN: CPT | Performed by: ORTHOPAEDIC SURGERY

## 2023-10-20 PROCEDURE — 73562 X-RAY EXAM OF KNEE 3: CPT | Mod: RT,FY

## 2023-10-20 PROCEDURE — 73562 X-RAY EXAM OF KNEE 3: CPT | Mod: RIGHT SIDE | Performed by: ORTHOPAEDIC SURGERY

## 2023-10-20 PROCEDURE — 1160F RVW MEDS BY RX/DR IN RCRD: CPT | Performed by: ORTHOPAEDIC SURGERY

## 2023-10-20 PROCEDURE — 1159F MED LIST DOCD IN RCRD: CPT | Performed by: ORTHOPAEDIC SURGERY

## 2023-10-20 PROCEDURE — 1036F TOBACCO NON-USER: CPT | Performed by: ORTHOPAEDIC SURGERY

## 2023-10-20 ASSESSMENT — PAIN - FUNCTIONAL ASSESSMENT: PAIN_FUNCTIONAL_ASSESSMENT: NO/DENIES PAIN

## 2023-10-20 NOTE — PROGRESS NOTES
No chief complaint on file.      The patient is here for follow-up of their side: right knee arthroplasty.  The patient has no knee pain.  The patient has no mechanical symptoms.  The patient has no swelling.  The patient is approximately 6 month(s) postop    Physical examination:    Examination of the side: right knee  The incision is  healed  No erythema or warmth.  No instability varus or valgus stressing the knee at 0, 30 or 60 degrees.  No instability in the AP plane at 90 degrees.  Range of motion: 0 degrees extension, 120 degrees flexion  There is no tenderness  Calf is soft, Homans negative  The patient has intact ankle dorsiflexion and plantarflexion.    Radiographs:   XR knee right 3 views  Interpreted By:  Brian Osborne,   STUDY:  XR KNEE RIGHT 3 VIEWS;  ;  10/20/2023 8:36 am      INDICATION:  Signs/Symptoms:pain.      ACCESSION NUMBER(S):  [id number]      ORDERING CLINICIAN:  BRIAN OSBORNE      FINDINGS:  Right knee films show a total knee arthroplasty in satisfactory  position. The patella is well positioned. No fracture is identified.  No obvious loosening or wear is appreciated.          Signed by: Brian Osborne 10/20/2023 8:41 AM  Dictation workstation:   [id number]        Impression:  Status post side: right total knee arthroplasty    Plan:  Discussed the importance of prophylactic dental antibiotics  Outpatient physical therapy  Follow up in 6 months  All questions answered

## 2023-11-21 ENCOUNTER — OFFICE VISIT (OUTPATIENT)
Dept: PRIMARY CARE | Facility: CLINIC | Age: 78
End: 2023-11-21
Payer: COMMERCIAL

## 2023-11-21 VITALS
DIASTOLIC BLOOD PRESSURE: 68 MMHG | SYSTOLIC BLOOD PRESSURE: 110 MMHG | RESPIRATION RATE: 16 BRPM | BODY MASS INDEX: 38.06 KG/M2 | HEIGHT: 66 IN | TEMPERATURE: 98.1 F | WEIGHT: 236.8 LBS | HEART RATE: 68 BPM | OXYGEN SATURATION: 95 %

## 2023-11-21 DIAGNOSIS — J32.9 SINUSITIS, UNSPECIFIED CHRONICITY, UNSPECIFIED LOCATION: ICD-10-CM

## 2023-11-21 DIAGNOSIS — R53.83 FATIGUE, UNSPECIFIED TYPE: ICD-10-CM

## 2023-11-21 DIAGNOSIS — J30.9 ALLERGIC RHINITIS, UNSPECIFIED SEASONALITY, UNSPECIFIED TRIGGER: ICD-10-CM

## 2023-11-21 DIAGNOSIS — I10 PRIMARY HYPERTENSION: ICD-10-CM

## 2023-11-21 DIAGNOSIS — M17.11 PRIMARY OSTEOARTHRITIS OF RIGHT KNEE: ICD-10-CM

## 2023-11-21 DIAGNOSIS — M77.8 LEFT SHOULDER TENDONITIS: ICD-10-CM

## 2023-11-21 DIAGNOSIS — N18.31 CKD STAGE G3A/A1, GFR 45-59 AND ALBUMIN CREATININE RATIO <30 MG/G (MULTI): ICD-10-CM

## 2023-11-21 DIAGNOSIS — Z96.651 HISTORY OF TOTAL RIGHT KNEE REPLACEMENT: ICD-10-CM

## 2023-11-21 DIAGNOSIS — M47.26 OSTEOARTHRITIS OF SPINE WITH RADICULOPATHY, LUMBAR REGION: ICD-10-CM

## 2023-11-21 DIAGNOSIS — E78.2 MIXED HYPERLIPIDEMIA: ICD-10-CM

## 2023-11-21 DIAGNOSIS — E55.9 VITAMIN D DEFICIENCY: ICD-10-CM

## 2023-11-21 PROCEDURE — 99214 OFFICE O/P EST MOD 30 MIN: CPT | Performed by: FAMILY MEDICINE

## 2023-11-21 RX ORDER — CHOLECALCIFEROL (VITAMIN D3) 50 MCG
TABLET ORAL
COMMUNITY

## 2023-11-21 RX ORDER — OMEGA-3 FATTY ACIDS 1000 MG
CAPSULE ORAL
COMMUNITY

## 2023-11-21 RX ORDER — FLUTICASONE PROPIONATE 50 MCG
2 SPRAY, SUSPENSION (ML) NASAL DAILY
Qty: 16 G | Refills: 2 | Status: SHIPPED | OUTPATIENT
Start: 2023-11-21 | End: 2024-11-20

## 2023-11-21 RX ORDER — CEFDINIR 300 MG/1
300 CAPSULE ORAL 2 TIMES DAILY
Qty: 20 CAPSULE | Refills: 0 | Status: SHIPPED | OUTPATIENT
Start: 2023-11-21 | End: 2023-12-01

## 2023-11-21 RX ORDER — ATORVASTATIN CALCIUM 40 MG/1
40 TABLET, FILM COATED ORAL DAILY
Qty: 90 TABLET | Refills: 0 | Status: SHIPPED | OUTPATIENT
Start: 2023-11-21 | End: 2024-01-22 | Stop reason: SDUPTHER

## 2023-11-21 RX ORDER — TRIAMCINOLONE ACETONIDE 1 MG/G
CREAM TOPICAL 2 TIMES DAILY
COMMUNITY
Start: 2023-11-13

## 2023-11-21 NOTE — PROGRESS NOTES
Subjective   Patient ID: Dora Guillermo is a 78 y.o. female who presents for Shoulder Pain and Laryngitis.    HPI   Patient is following up on left shoulder pain. She states the pain has resolved.     She is having nasal congestion, sore throat , Laryngitis x 5 days. She is blowing out yellow mucus in the morning. She denies chills, fever, or ear pain.  She is taking Equate zyrtec and Ibuprofen for symptoms.    She had tooth removal done on 9/5/23 for a plate to be placed in March 2024.    She would like the influenza shot today if appropriate.    Review of Systems  \12 Systems have been reviewed as follows.  Constitutional: Fever, weight gain, weight loss, appetite change, night sweats, fatigue, chills.  Eyes : blurry, double vision, vision, loss, tearing, redness, pain, sensitivity to light, glaucoma.  Ears, nose, mouth, and throat: Hearing loss, ringing in the ears, ear pain, nasal congestion, nasal drainage, nosebleeds, mouth, throat, irritation tooth problem.  Cardiovascular :chest pain, pressure, heart racing, palpitations, sweating, leg swelling, high or low blood pressure  Pulmonary: Cough, yellow or green sputum, blood and sputum, shortness of breath, wheezing  Gastrointestinal: Nausea, vomiting, diarrhea, constipation, pain, blood in stool, or vomitus, heartburn, difficulty swallowing  Genitourinary: incontinence, abnormal bleeding, abnormal discharge, urinary frequency, urinary hesitancy, pain, impotence sexual problem, infection, urinary retention  Musculoskeletal: Pain, stiffness, joint, redness or warmth, arthritis, back pain, weakness, muscle wasting, sprain or fracture  Neuro: Weight weakness, dizziness, change in voice, change in taste change in vision, change in hearing, loss, or change of sensation, trouble walking, balance problems coordination problems, shaking, speech problem  Endocrine , cold or heat intolerance, blood sugar problem, weight gain or loss missed periods hot flashes, sweats,  "change in body hair, change in libido, increased thirst, increased urination  Heme/lymph: Swelling, bleeding, problem anemia, bruising, enlarged lymph nodes  Allergic/immunologic: H. plus nasal drip, watery itchy eyes, nasal drainage, immunosuppressed  The above were reviewed and noted negative except as noted in HPI and Problem List.      Objective   /68 (BP Location: Right arm, Patient Position: Sitting, BP Cuff Size: Adult)   Pulse 68   Temp 36.7 °C (98.1 °F)   Resp 16   Ht 1.676 m (5' 6\")   Wt 107 kg (236 lb 12.8 oz)   SpO2 95%   BMI 38.22 kg/m²     Physical Exam  Constitutional: Well developed, well nourished, alert and in no acute distress   Eyes: Normal external exam. Pupils equally round and reactive to light with normal accommodation and extraocular movements intact.  Neck: Supple, no lymphadenopathy or masses.   Cardiovascular: Regular rate and rhythm, normal S1 and S2, no murmurs, gallops, or rubs. Radial pulses normal. No peripheral edema.  Pulmonary: No respiratory distress, lungs clear to auscultation bilaterally. No wheezes, rhonchi, rales.  Abdomen: soft,non tender, non distended, without masses or HSM  Skin: Warm, well perfused, normal skin turgor and color.   Neurologic: Cranial nerves II-XII grossly intact.   Psychiatric: Mood calm and affect normal  Musculoskeletal: Moving all extremities without restriction      Assessment/Plan   Problem List Items Addressed This Visit             ICD-10-CM    CKD stage G3a/A1, GFR 45-59 and albumin creatinine ratio <30 mg/g (CMS/[facility]) N18.31    Relevant Orders    Follow Up In [facility] - PCP - Established    Hyperlipidemia E78.5    Relevant Medications    atorvastatin (Lipitor) 40 mg tablet    Other Relevant Orders    Follow Up In [facility] - PCP - Established    Lipid Panel    Hypertension I10    Relevant Orders    Follow Up In [facility] - PCP - Established    CBC and Auto Differential    Comprehensive Metabolic " Panel    Primary osteoarthritis of right knee M17.11    Relevant Orders    Follow Up In [facility] - PCP - Established    History of total right knee replacement Z96.651    Relevant Orders    Follow Up In [facility] - PCP - Established    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    Relevant Orders    Follow Up In [facility] - PCP - Established    Left shoulder tendonitis M77.8     Other Visit Diagnoses         Codes    Vitamin D deficiency     E55.9    Relevant Orders    Follow Up In [facility] - PCP - Established    Vitamin D 25-Hydroxy,Total (for eval of Vitamin D levels)    Fatigue, unspecified type     R53.83    Sinusitis, unspecified chronicity, unspecified location     J32.9    Relevant Medications    cefdinir (Omnicef) 300 mg capsule    fluticasone (Flonase) 50 mcg/actuation nasal spray    Allergic rhinitis, unspecified seasonality, unspecified trigger     J30.9    Relevant Medications    fluticasone (Flonase) 50 mcg/actuation nasal spray    Other Relevant Orders    Follow Up In [facility] - PCP - Established            Provider Attestation - Scribe documentation    All medical record entries made by the Scribe were at my direction and personally dictated by me. I have reviewed the chart and agree that the record accurately reflects my personal performance of the history, physical exam, discussion and plan.    Scribe Attestation  By signing my name below, I, Bassam Zafar MD   , Scribe   attest that this documentation has been prepared under the direction and in the presence of Bassam Zafar MD.       Continue current medications and therapy for chronic medical conditions    PT & ice L shoulder- improved    Start lipitor 40 mg daily    Start flonase nasal spray    Take omnicef 300 mg twice daily x 10 days     BW prior to next visit

## 2023-11-30 ENCOUNTER — TELEPHONE (OUTPATIENT)
Dept: PRIMARY CARE | Facility: CLINIC | Age: 78
End: 2023-11-30

## 2023-12-01 ENCOUNTER — CLINICAL SUPPORT (OUTPATIENT)
Dept: PRIMARY CARE | Facility: CLINIC | Age: 78
End: 2023-12-01
Payer: COMMERCIAL

## 2023-12-01 DIAGNOSIS — Z23 ENCOUNTER FOR IMMUNIZATION: ICD-10-CM

## 2023-12-01 PROCEDURE — 90662 IIV NO PRSV INCREASED AG IM: CPT | Performed by: FAMILY MEDICINE

## 2023-12-01 PROCEDURE — G0008 ADMIN INFLUENZA VIRUS VAC: HCPCS | Performed by: FAMILY MEDICINE

## 2023-12-21 DIAGNOSIS — I10 PRIMARY HYPERTENSION: Primary | ICD-10-CM

## 2023-12-21 RX ORDER — AMLODIPINE BESYLATE 5 MG/1
5 TABLET ORAL DAILY
Qty: 90 TABLET | Refills: 1 | Status: SHIPPED | OUTPATIENT
Start: 2023-12-21

## 2023-12-28 DIAGNOSIS — I10 HYPERTENSION, UNSPECIFIED TYPE: ICD-10-CM

## 2023-12-28 RX ORDER — LISINOPRIL 40 MG/1
40 TABLET ORAL DAILY
Qty: 90 TABLET | Refills: 1 | Status: SHIPPED | OUTPATIENT
Start: 2023-12-28 | End: 2024-12-22

## 2024-01-22 ENCOUNTER — LAB (OUTPATIENT)
Dept: LAB | Facility: LAB | Age: 79
End: 2024-01-22
Payer: COMMERCIAL

## 2024-01-22 ENCOUNTER — OFFICE VISIT (OUTPATIENT)
Dept: PRIMARY CARE | Facility: CLINIC | Age: 79
End: 2024-01-22
Payer: COMMERCIAL

## 2024-01-22 VITALS
DIASTOLIC BLOOD PRESSURE: 74 MMHG | OXYGEN SATURATION: 96 % | BODY MASS INDEX: 39.32 KG/M2 | TEMPERATURE: 97.9 F | WEIGHT: 243.6 LBS | HEART RATE: 65 BPM | RESPIRATION RATE: 18 BRPM | SYSTOLIC BLOOD PRESSURE: 124 MMHG

## 2024-01-22 DIAGNOSIS — J30.9 ALLERGIC RHINITIS, UNSPECIFIED SEASONALITY, UNSPECIFIED TRIGGER: ICD-10-CM

## 2024-01-22 DIAGNOSIS — I10 PRIMARY HYPERTENSION: ICD-10-CM

## 2024-01-22 DIAGNOSIS — Z96.651 HISTORY OF TOTAL RIGHT KNEE REPLACEMENT: ICD-10-CM

## 2024-01-22 DIAGNOSIS — N18.31 CKD STAGE G3A/A1, GFR 45-59 AND ALBUMIN CREATININE RATIO <30 MG/G (MULTI): ICD-10-CM

## 2024-01-22 DIAGNOSIS — E55.9 VITAMIN D DEFICIENCY: ICD-10-CM

## 2024-01-22 DIAGNOSIS — M47.26 OSTEOARTHRITIS OF SPINE WITH RADICULOPATHY, LUMBAR REGION: ICD-10-CM

## 2024-01-22 DIAGNOSIS — M17.11 PRIMARY OSTEOARTHRITIS OF RIGHT KNEE: ICD-10-CM

## 2024-01-22 DIAGNOSIS — E78.2 MIXED HYPERLIPIDEMIA: ICD-10-CM

## 2024-01-22 DIAGNOSIS — E66.01 OBESITY, MORBID (MULTI): Primary | ICD-10-CM

## 2024-01-22 LAB
25(OH)D3 SERPL-MCNC: 61 NG/ML (ref 30–100)
ALBUMIN SERPL BCP-MCNC: 4.9 G/DL (ref 3.4–5)
ALP SERPL-CCNC: 57 U/L (ref 33–136)
ALT SERPL W P-5'-P-CCNC: 20 U/L (ref 7–45)
ANION GAP SERPL CALC-SCNC: 16 MMOL/L (ref 10–20)
AST SERPL W P-5'-P-CCNC: 24 U/L (ref 9–39)
BASOPHILS # BLD AUTO: 0.08 X10*3/UL (ref 0–0.1)
BASOPHILS NFR BLD AUTO: 1.3 %
BILIRUB SERPL-MCNC: 0.5 MG/DL (ref 0–1.2)
BUN SERPL-MCNC: 26 MG/DL (ref 6–23)
CALCIUM SERPL-MCNC: 9.5 MG/DL (ref 8.6–10.3)
CHLORIDE SERPL-SCNC: 103 MMOL/L (ref 98–107)
CHOLEST SERPL-MCNC: 153 MG/DL (ref 0–199)
CHOLESTEROL/HDL RATIO: 2.2
CO2 SERPL-SCNC: 28 MMOL/L (ref 21–32)
CREAT SERPL-MCNC: 1.03 MG/DL (ref 0.5–1.05)
EGFRCR SERPLBLD CKD-EPI 2021: 56 ML/MIN/1.73M*2
EOSINOPHIL # BLD AUTO: 0.46 X10*3/UL (ref 0–0.4)
EOSINOPHIL NFR BLD AUTO: 7.7 %
ERYTHROCYTE [DISTWIDTH] IN BLOOD BY AUTOMATED COUNT: 14.3 % (ref 11.5–14.5)
GLUCOSE SERPL-MCNC: 84 MG/DL (ref 74–99)
HCT VFR BLD AUTO: 43.8 % (ref 36–46)
HDLC SERPL-MCNC: 68.3 MG/DL
HGB BLD-MCNC: 14.2 G/DL (ref 12–16)
IMM GRANULOCYTES # BLD AUTO: 0.03 X10*3/UL (ref 0–0.5)
IMM GRANULOCYTES NFR BLD AUTO: 0.5 % (ref 0–0.9)
LDLC SERPL CALC-MCNC: 63 MG/DL
LYMPHOCYTES # BLD AUTO: 1.73 X10*3/UL (ref 0.8–3)
LYMPHOCYTES NFR BLD AUTO: 28.9 %
MCH RBC QN AUTO: 28.9 PG (ref 26–34)
MCHC RBC AUTO-ENTMCNC: 32.4 G/DL (ref 32–36)
MCV RBC AUTO: 89 FL (ref 80–100)
MONOCYTES # BLD AUTO: 0.57 X10*3/UL (ref 0.05–0.8)
MONOCYTES NFR BLD AUTO: 9.5 %
NEUTROPHILS # BLD AUTO: 3.11 X10*3/UL (ref 1.6–5.5)
NEUTROPHILS NFR BLD AUTO: 52.1 %
NON HDL CHOLESTEROL: 85 MG/DL (ref 0–149)
NRBC BLD-RTO: 0 /100 WBCS (ref 0–0)
PLATELET # BLD AUTO: 271 X10*3/UL (ref 150–450)
POTASSIUM SERPL-SCNC: 4.6 MMOL/L (ref 3.5–5.3)
PROT SERPL-MCNC: 7.3 G/DL (ref 6.4–8.2)
RBC # BLD AUTO: 4.92 X10*6/UL (ref 4–5.2)
SODIUM SERPL-SCNC: 142 MMOL/L (ref 136–145)
TRIGL SERPL-MCNC: 109 MG/DL (ref 0–149)
VLDL: 22 MG/DL (ref 0–40)
WBC # BLD AUTO: 6 X10*3/UL (ref 4.4–11.3)

## 2024-01-22 PROCEDURE — 80053 COMPREHEN METABOLIC PANEL: CPT

## 2024-01-22 PROCEDURE — 85025 COMPLETE CBC W/AUTO DIFF WBC: CPT

## 2024-01-22 PROCEDURE — 99214 OFFICE O/P EST MOD 30 MIN: CPT | Performed by: FAMILY MEDICINE

## 2024-01-22 PROCEDURE — 82306 VITAMIN D 25 HYDROXY: CPT

## 2024-01-22 PROCEDURE — 36415 COLL VENOUS BLD VENIPUNCTURE: CPT

## 2024-01-22 PROCEDURE — 80061 LIPID PANEL: CPT

## 2024-01-22 RX ORDER — ATORVASTATIN CALCIUM 40 MG/1
40 TABLET, FILM COATED ORAL DAILY
Qty: 90 TABLET | Refills: 1 | Status: SHIPPED | OUTPATIENT
Start: 2024-01-22 | End: 2024-07-20

## 2024-01-22 NOTE — PROGRESS NOTES
Subjective   Patient ID: Dora Guillermo is a 78 y.o. female who presents for Hypertension and Hyperlipidemia.    HPI   Presents today for above reason    Pt tries to follow low fat/sugar/salt diet  Stays active  Denies SOB/chest pain/palpitations. Did have recent cold and some congestion still present  Pt is not a smoker  No urinary or bowel issues per pt.  Sleeping well  Medications working well    Pt was started on Atorvastatin LOV  No side effects noticed    Review of Systems  12 Systems have been reviewed as follows.  Constitutional: Fever, weight gain, weight loss, appetite change, night sweats, fatigue, chills.  Eyes : blurry, double vision, vision, loss, tearing, redness, pain, sensitivity to light, glaucoma.  Ears, nose, mouth, and throat: Hearing loss, ringing in the ears, ear pain, nasal congestion, nasal drainage, nosebleeds, mouth, throat, irritation tooth problem.  Cardiovascular :chest pain, pressure, heart racing, palpitations, sweating, leg swelling, high or low blood pressure  Pulmonary: Cough, yellow or green sputum, blood and sputum, shortness of breath, wheezing  Gastrointestinal: Nausea, vomiting, diarrhea, constipation, pain, blood in stool, or vomitus, heartburn, difficulty swallowing  Genitourinary: incontinence, abnormal bleeding, abnormal discharge, urinary frequency, urinary hesitancy, pain, impotence sexual problem, infection, urinary retention  Musculoskeletal: Pain, stiffness, joint, redness or warmth, arthritis, back pain, weakness, muscle wasting, sprain or fracture  Neuro: Weight weakness, dizziness, change in voice, change in taste change in vision, change in hearing, loss, or change of sensation, trouble walking, balance problems coordination problems, shaking, speech problem  Endocrine , cold or heat intolerance, blood sugar problem, weight gain or loss missed periods hot flashes, sweats, change in body hair, change in libido, increased thirst, increased urination  Heme/lymph:  Swelling, bleeding, problem anemia, bruising, enlarged lymph nodes  Allergic/immunologic: H. plus nasal drip, watery itchy eyes, nasal drainage, immunosuppressed  The above were reviewed and noted negative except as noted in HPI and Problem List.      Objective   /74   Pulse 65   Temp 36.6 °C (97.9 °F)   Resp 18   Wt 110 kg (243 lb 9.6 oz)   SpO2 96%   BMI 39.32 kg/m²     Physical Exam  Constitutional: Well developed, well nourished, alert and in no acute distress   Eyes: Normal external exam. Pupils equally round and reactive to light with normal accommodation and extraocular movements intact.  Neck: Supple, no lymphadenopathy or masses.   Cardiovascular: Regular rate and rhythm, normal S1 and S2, no murmurs, gallops, or rubs. Radial pulses normal. No peripheral edema.  Pulmonary: No respiratory distress, lungs clear to auscultation bilaterally. No wheezes, rhonchi, rales.  Abdomen: soft,non tender, non distended, without masses or HSM  Skin: Warm, well perfused, normal skin turgor and color.   Neurologic: Cranial nerves II-XII grossly intact.   Psychiatric: Mood calm and affect normal  Musculoskeletal: Moving all extremities without restriction      Assessment/Plan   Problem List Items Addressed This Visit             ICD-10-CM    CKD stage G3a/A1, GFR 45-59 and albumin creatinine ratio <30 mg/g (CMS/[facility]) N18.31    Relevant Orders    Follow Up In [facility] - PCP - Established    Hyperlipidemia E78.5    Relevant Medications    atorvastatin (Lipitor) 40 mg tablet    Other Relevant Orders    Follow Up In [facility] - PCP - Established    Hypertension I10    Relevant Orders    Follow Up In [facility] - PCP - Established    Primary osteoarthritis of right knee M17.11    Relevant Orders    Follow Up In [facility] - PCP - Established    History of total right knee replacement Z96.651    Relevant Orders    Follow Up In [facility] - PCP - Established     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    Relevant Orders    Follow Up In [facility] - PCP - Established    Obesity, morbid (CMS/[facility]) - Primary E66.01     Other Visit Diagnoses         Codes    Vitamin D deficiency     E55.9    Relevant Orders    Follow Up In [facility] - PCP - Established    Allergic rhinitis, unspecified seasonality, unspecified trigger     J30.9    Relevant Orders    Follow Up In [facility] - PCP - Established              Continue current medications and therapy for chronic medical conditions.    Patient was advised importance of proper diet/nutrition in addition adequate hydration. Patient was encouraged moderate exercise program to include 30 minutes daily for 5 days of the week or 150 minutes weekly. Patient will follow-up with us as scheduled.      Continue Lipitor 40 mg daily  BW to be done.  Left Shoulder has improved   Do thyroid screen once per year.        Scribe Attestation  By signing my name below, I, Bassam Zafar MD   , Scribe attest that this documentation has been prepared under the direction and in the presence of Bassam Zafar MD.      Provider Attestation - Scribe documentation  All medical record entries made by the Scribe were at my direction and personally dictated by me. I have reviewed the chart and agree that the record accurately reflects my personal performance of the history, physical exam, discussion and plan.

## 2024-03-26 ENCOUNTER — OFFICE VISIT (OUTPATIENT)
Dept: PRIMARY CARE | Facility: CLINIC | Age: 79
End: 2024-03-26
Payer: COMMERCIAL

## 2024-03-26 VITALS
HEART RATE: 71 BPM | TEMPERATURE: 97.7 F | BODY MASS INDEX: 36.96 KG/M2 | DIASTOLIC BLOOD PRESSURE: 84 MMHG | OXYGEN SATURATION: 97 % | HEIGHT: 66 IN | RESPIRATION RATE: 16 BRPM | SYSTOLIC BLOOD PRESSURE: 143 MMHG | WEIGHT: 230 LBS

## 2024-03-26 DIAGNOSIS — E66.01 CLASS 2 SEVERE OBESITY WITH SERIOUS COMORBIDITY AND BODY MASS INDEX (BMI) OF 37.0 TO 37.9 IN ADULT, UNSPECIFIED OBESITY TYPE (MULTI): ICD-10-CM

## 2024-03-26 DIAGNOSIS — R30.0 BURNING WITH URINATION: ICD-10-CM

## 2024-03-26 DIAGNOSIS — R39.9 SYMPTOMS OF URINARY TRACT INFECTION: Primary | ICD-10-CM

## 2024-03-26 DIAGNOSIS — R35.0 FREQUENCY OF URINATION: ICD-10-CM

## 2024-03-26 LAB
POC APPEARANCE, URINE: ABNORMAL
POC BILIRUBIN, URINE: NEGATIVE
POC BLOOD, URINE: ABNORMAL
POC COLOR, URINE: YELLOW
POC GLUCOSE, URINE: NEGATIVE MG/DL
POC KETONES, URINE: NEGATIVE MG/DL
POC LEUKOCYTES, URINE: ABNORMAL
POC NITRITE,URINE: NEGATIVE
POC PH, URINE: 7 PH
POC PROTEIN, URINE: ABNORMAL MG/DL
POC SPECIFIC GRAVITY, URINE: 1.02
POC UROBILINOGEN, URINE: 0.2 EU/DL

## 2024-03-26 PROCEDURE — 99213 OFFICE O/P EST LOW 20 MIN: CPT | Performed by: NURSE PRACTITIONER

## 2024-03-26 PROCEDURE — 87186 SC STD MICRODIL/AGAR DIL: CPT

## 2024-03-26 PROCEDURE — 87086 URINE CULTURE/COLONY COUNT: CPT

## 2024-03-26 PROCEDURE — 81003 URINALYSIS AUTO W/O SCOPE: CPT | Performed by: NURSE PRACTITIONER

## 2024-03-26 RX ORDER — SULFAMETHOXAZOLE AND TRIMETHOPRIM 800; 160 MG/1; MG/1
1 TABLET ORAL 2 TIMES DAILY
Qty: 14 TABLET | Refills: 0 | Status: SHIPPED | OUTPATIENT
Start: 2024-03-26 | End: 2024-04-02

## 2024-03-26 ASSESSMENT — PATIENT HEALTH QUESTIONNAIRE - PHQ9
SUM OF ALL RESPONSES TO PHQ9 QUESTIONS 1 AND 2: 0
1. LITTLE INTEREST OR PLEASURE IN DOING THINGS: NOT AT ALL
1. LITTLE INTEREST OR PLEASURE IN DOING THINGS: NOT AT ALL
2. FEELING DOWN, DEPRESSED OR HOPELESS: NOT AT ALL
SUM OF ALL RESPONSES TO PHQ9 QUESTIONS 1 AND 2: 0
2. FEELING DOWN, DEPRESSED OR HOPELESS: NOT AT ALL
1. LITTLE INTEREST OR PLEASURE IN DOING THINGS: NOT AT ALL
2. FEELING DOWN, DEPRESSED OR HOPELESS: NOT AT ALL
SUM OF ALL RESPONSES TO PHQ9 QUESTIONS 1 AND 2: 0

## 2024-03-26 ASSESSMENT — ENCOUNTER SYMPTOMS
DEPRESSION: 0
OCCASIONAL FEELINGS OF UNSTEADINESS: 0
LOSS OF SENSATION IN FEET: 0

## 2024-03-26 NOTE — PROGRESS NOTES
Subjective   Patient ID: Dora Guillermo is a 79 y.o. female who presents for UTI.    HPI  Symptoms: frequency, and burning, urgency  Length of symptoms: 2 weeks ago  OTC: none  Related information:      Review of Systems    Objective   There were no vitals taken for this visit.    Physical Exam    Assessment/Plan

## 2024-03-26 NOTE — PATIENT INSTRUCTIONS
DISCHARGE SUMMARY:   Patient seen and examined. Probable diagnosis, differential diagnosis, treatment, treatment options, and probable complications were discussed and explained to patient. she was to take medication/s associated with this visit. she may take over-the-counter pain and/or fever medication if needed. Advised increased oral fluid intake (2 liters of water or more per day). Reinforced to continue personal hygiene. Patient to return to clinic if there is worsening or persistence of symptoms. Patient verbalized understanding.    Patient to come back in 7 - 10 days if needed for worsening symptoms.

## 2024-03-26 NOTE — PROGRESS NOTES
Subjective   Patient ID: Dora Guillermo is a 79 y.o. female who is with complaint of symptoms of UTI.    HPI  Patient is a 79 y.o. female who CONSULTED AT [facility] today. Patient is with complaints of burning sensation on urination, increased urinary frequency, urgency of urination, sensation of inadequate emptying post voiding, nocturia, incontinence, cloudy urine, nausea (gone now), and chills. She denies having any lower abdominal discomfort (dysuria), low back pain, flank pain, blood in urine, vomiting, nor fever. Patient states symptoms has been going on for 6 days. Patient has not taken any medication for relief of symptoms.     Review of Systems  General: no weight loss, generally healthy, no fatigue  Head:  no headaches / sinus pain, no vertigo, no injury  Eyes: no diplopia, no tearing, no pain,   Ears: no change in hearing, no tinnitus, no bleeding, no vertigo  Mouth:  no dental difficulties, no gingival bleeding, no sore throat, no loss of sense of taste  Nose: no congestion, no  discharge, no bleeding, no obstruction, no loss of sense of smell  Neck: no stiffness, no pain, no tenderness, no masses, no bruit  Pulmonary: no dyspnea, no wheezing, no hemoptysis, no cough  Cardiovascular: no chest pain, no palpitations, no syncope, no orthopnea  Gastrointestinal: no change in appetite, no dysphagia, no abdominal pains, no diarrhea, (+) hx nausea, no emesis, no melena  Genito Urinary: (+) burning sensation on urination, (+) increased urinary frequency, (+) urgency of urination, (+) sensation of inadequate emptying post voiding, (+) nocturia, (+) incontinence, (+) cloudy urine, no lower abdominal discomfort (dysuria), no low back pain, no flank pain, no blood in urine,   Musculoskeletal: no muscle ache, no joint pain, no limitation of range of motion, no paresthesia, no numbness  Constitutional: no fever, (+) chills, no night sweats    Objective   Physical Exam  General: ambulatory, in  no acute distress  Head: normocephalic, no lesions  Eyes: pink palpebral conjunctiva, anicteric sclerae, PERRLA, EOM's full  Abdomen: flat, NABS, soft, no direct tenderness, no rebound tenderness, no mass palpated, SIGNS: no [place], no Rovsings, no Psoas, no Obturator; No CVA tenderness,    Assessment/Plan   Problem List Items Addressed This Visit    None  Visit Diagnoses         Codes    Symptoms of urinary tract infection    -  Primary R39.9    Relevant Medications    sulfamethoxazole-trimethoprim (Bactrim DS) 800-160 mg tablet    Other Relevant Orders    POCT UA Automated manually resulted    Urine Culture    Burning with urination     R30.0    Relevant Medications    sulfamethoxazole-trimethoprim (Bactrim DS) 800-160 mg tablet    Other Relevant Orders    POCT UA Automated manually resulted    Urine Culture    Frequency of urination     R35.0    Relevant Medications    sulfamethoxazole-trimethoprim (Bactrim DS) 800-160 mg tablet    Other Relevant Orders    POCT UA Automated manually resulted    Urine Culture    BMI 37.0-37.9, adult     Z68.37        Urinalysis was done at the office today. Urinalysis result explained and discussed with patient. Urine sample submitted to laboratory for culture and sensitivity study. The laboratory examination requested were explained and discussed with patient.     DISCHARGE SUMMARY:   Patient seen and examined. Probable diagnosis, differential diagnosis, treatment, treatment options, and probable complications were discussed and explained to patient. she was to take medication/s associated with this visit. she may take over-the-counter pain and/or fever medication if needed. Advised increased oral fluid intake (2 liters of water or more per day). Reinforced to continue personal hygiene. Patient to return to clinic if there is worsening or persistence of symptoms. Patient verbalized understanding.    Patient to come back in 7 - 10 days if needed for worsening symptoms.            Carloz Littlejohn, MYLA-CNP 03/26/24 1:57 PM

## 2024-03-27 ENCOUNTER — DOCUMENTATION (OUTPATIENT)
Dept: PRIMARY CARE | Facility: CLINIC | Age: 79
End: 2024-03-27
Payer: COMMERCIAL

## 2024-03-27 ENCOUNTER — TELEPHONE (OUTPATIENT)
Dept: PRIMARY CARE | Facility: CLINIC | Age: 79
End: 2024-03-27
Payer: COMMERCIAL

## 2024-03-27 DIAGNOSIS — R39.9 SYMPTOMS OF URINARY TRACT INFECTION: ICD-10-CM

## 2024-03-27 DIAGNOSIS — R30.0 BURNING WITH URINATION: Primary | ICD-10-CM

## 2024-03-27 DIAGNOSIS — R35.0 FREQUENCY OF URINATION: ICD-10-CM

## 2024-03-27 RX ORDER — CEFDINIR 300 MG/1
300 CAPSULE ORAL 2 TIMES DAILY
Qty: 20 CAPSULE | Refills: 0 | Status: SHIPPED | OUTPATIENT
Start: 2024-03-27 | End: 2024-04-06

## 2024-03-27 NOTE — TELEPHONE ENCOUNTER
Saw jl yesterday  Was given Bactrim and she broken out in hives pt was wondering what else jl can call in   Pharmacy    [facility] [address] [address] [facility]

## 2024-03-29 ENCOUNTER — APPOINTMENT (OUTPATIENT)
Dept: PRIMARY CARE | Facility: CLINIC | Age: 79
End: 2024-03-29
Payer: COMMERCIAL

## 2024-03-29 ENCOUNTER — DOCUMENTATION (OUTPATIENT)
Dept: PRIMARY CARE | Facility: CLINIC | Age: 79
End: 2024-03-29

## 2024-03-29 LAB — BACTERIA UR CULT: ABNORMAL

## 2024-04-22 ENCOUNTER — OFFICE VISIT (OUTPATIENT)
Dept: PRIMARY CARE | Facility: CLINIC | Age: 79
End: 2024-04-22
Payer: COMMERCIAL

## 2024-04-22 VITALS
SYSTOLIC BLOOD PRESSURE: 122 MMHG | HEIGHT: 66 IN | BODY MASS INDEX: 39.76 KG/M2 | OXYGEN SATURATION: 94 % | HEART RATE: 67 BPM | WEIGHT: 247.4 LBS | RESPIRATION RATE: 16 BRPM | DIASTOLIC BLOOD PRESSURE: 82 MMHG | TEMPERATURE: 97.6 F

## 2024-04-22 DIAGNOSIS — R39.9 UTI SYMPTOMS: ICD-10-CM

## 2024-04-22 DIAGNOSIS — M17.11 PRIMARY OSTEOARTHRITIS OF RIGHT KNEE: ICD-10-CM

## 2024-04-22 DIAGNOSIS — M47.26 OSTEOARTHRITIS OF SPINE WITH RADICULOPATHY, LUMBAR REGION: ICD-10-CM

## 2024-04-22 DIAGNOSIS — Z00.00 HEALTHCARE MAINTENANCE: ICD-10-CM

## 2024-04-22 DIAGNOSIS — E55.9 VITAMIN D DEFICIENCY: ICD-10-CM

## 2024-04-22 DIAGNOSIS — Z12.31 ENCOUNTER FOR SCREENING MAMMOGRAM FOR MALIGNANT NEOPLASM OF BREAST: ICD-10-CM

## 2024-04-22 DIAGNOSIS — Z00.00 ENCOUNTER FOR ANNUAL WELLNESS EXAM IN MEDICARE PATIENT: Primary | ICD-10-CM

## 2024-04-22 DIAGNOSIS — Z00.00 ROUTINE GENERAL MEDICAL EXAMINATION AT HEALTH CARE FACILITY: ICD-10-CM

## 2024-04-22 DIAGNOSIS — J30.9 ALLERGIC RHINITIS, UNSPECIFIED SEASONALITY, UNSPECIFIED TRIGGER: ICD-10-CM

## 2024-04-22 DIAGNOSIS — E78.2 MIXED HYPERLIPIDEMIA: ICD-10-CM

## 2024-04-22 DIAGNOSIS — Z96.651 HISTORY OF TOTAL RIGHT KNEE REPLACEMENT: ICD-10-CM

## 2024-04-22 DIAGNOSIS — N18.31 CKD STAGE G3A/A1, GFR 45-59 AND ALBUMIN CREATININE RATIO <30 MG/G (MULTI): ICD-10-CM

## 2024-04-22 DIAGNOSIS — I10 PRIMARY HYPERTENSION: ICD-10-CM

## 2024-04-22 LAB
POC APPEARANCE, URINE: CLEAR
POC BILIRUBIN, URINE: NEGATIVE
POC BLOOD, URINE: ABNORMAL
POC COLOR, URINE: YELLOW
POC GLUCOSE, URINE: NEGATIVE MG/DL
POC KETONES, URINE: NEGATIVE MG/DL
POC LEUKOCYTES, URINE: ABNORMAL
POC NITRITE,URINE: NEGATIVE
POC PH, URINE: 6 PH
POC PROTEIN, URINE: ABNORMAL MG/DL
POC SPECIFIC GRAVITY, URINE: 1.02
POC UROBILINOGEN, URINE: 0.2 EU/DL

## 2024-04-22 PROCEDURE — 1157F ADVNC CARE PLAN IN RCRD: CPT | Performed by: FAMILY MEDICINE

## 2024-04-22 PROCEDURE — 1036F TOBACCO NON-USER: CPT | Performed by: FAMILY MEDICINE

## 2024-04-22 PROCEDURE — 81003 URINALYSIS AUTO W/O SCOPE: CPT | Performed by: FAMILY MEDICINE

## 2024-04-22 PROCEDURE — 3079F DIAST BP 80-89 MM HG: CPT | Performed by: FAMILY MEDICINE

## 2024-04-22 PROCEDURE — G0439 PPPS, SUBSEQ VISIT: HCPCS | Performed by: FAMILY MEDICINE

## 2024-04-22 PROCEDURE — 3074F SYST BP LT 130 MM HG: CPT | Performed by: FAMILY MEDICINE

## 2024-04-22 PROCEDURE — 1159F MED LIST DOCD IN RCRD: CPT | Performed by: FAMILY MEDICINE

## 2024-04-22 PROCEDURE — 1160F RVW MEDS BY RX/DR IN RCRD: CPT | Performed by: FAMILY MEDICINE

## 2024-04-22 PROCEDURE — 99214 OFFICE O/P EST MOD 30 MIN: CPT | Performed by: FAMILY MEDICINE

## 2024-04-22 PROCEDURE — 1170F FXNL STATUS ASSESSED: CPT | Performed by: FAMILY MEDICINE

## 2024-04-22 RX ORDER — NITROFURANTOIN 25; 75 MG/1; MG/1
100 CAPSULE ORAL 2 TIMES DAILY
Qty: 20 CAPSULE | Refills: 0 | Status: SHIPPED | OUTPATIENT
Start: 2024-04-22 | End: 2024-05-02

## 2024-04-22 ASSESSMENT — ENCOUNTER SYMPTOMS
DEPRESSION: 0
OCCASIONAL FEELINGS OF UNSTEADINESS: 0
LOSS OF SENSATION IN FEET: 0

## 2024-04-22 ASSESSMENT — ACTIVITIES OF DAILY LIVING (ADL)
GROCERY_SHOPPING: INDEPENDENT
DRESSING: INDEPENDENT
BATHING: INDEPENDENT
MANAGING_FINANCES: INDEPENDENT
TAKING_MEDICATION: INDEPENDENT
DOING_HOUSEWORK: INDEPENDENT

## 2024-04-22 ASSESSMENT — PATIENT HEALTH QUESTIONNAIRE - PHQ9
SUM OF ALL RESPONSES TO PHQ9 QUESTIONS 1 AND 2: 0
2. FEELING DOWN, DEPRESSED OR HOPELESS: NOT AT ALL
1. LITTLE INTEREST OR PLEASURE IN DOING THINGS: NOT AT ALL

## 2024-04-22 NOTE — PROGRESS NOTES
"Subjective   Reason for Visit: Dora Guillermo is an 79 y.o. female here for a Medicare Wellness visit.     Past Medical, Surgical, and Family History reviewed and updated in chart.    Reviewed all medications by prescribing practitioner or clinical pharmacist (such as prescriptions, OTCs, herbal therapies and supplements) and documented in the medical record.    HPI    Patient Care Team:  Bassam Zafar MD as PCP - General (Family Medicine)  Bassam Zafar MD as PCP - [employer] PCP     Review of Systems    Objective   Vitals:  /82 (BP Location: Left arm, Patient Position: Sitting, BP Cuff Size: Adult)   Pulse 67   Temp 36.4 °C (97.6 °F) (Temporal)   Resp 16   Ht 1.676 m (5' 6\")   Wt 112 kg (247 lb 6.4 oz)   SpO2 94%   BMI 39.93 kg/m²       Physical Exam    Assessment/Plan   Problem List Items Addressed This Visit     CKD stage G3a/A1, GFR 45-59 and albumin creatinine ratio <30 mg/g (Multi)    Relevant Orders    Follow Up In [facility] - PCP - Established    Hyperlipidemia    Relevant Orders    Follow Up In [facility] - PCP - Established    Comprehensive Metabolic Panel    Lipid Panel    Hypertension    Relevant Orders    Follow Up In [facility] - PCP - Established    CBC and Auto Differential    Primary osteoarthritis of right knee    Relevant Orders    Follow Up In [facility] - PCP - Established    History of total right knee replacement    Overview     R KNEE REPLACED IN APRIL SWOLLEN R/O BLOOD CLOT         Relevant Orders    Follow Up In [facility] - PCP - Established    Osteoarthritis of lumbar spine    Relevant Orders    Follow Up In [facility] - PCP - Established   Other Visit Diagnoses     Routine general medical examination at health care facility    -  Primary    Encounter for annual wellness exam in Medicare patient        Relevant Orders    Follow Up In [facility] - PCP - Established    " CBC and Auto Differential    Comprehensive Metabolic Panel    Lipid Panel    Vitamin D 25-Hydroxy,Total (for eval of Vitamin D levels)    Healthcare maintenance        Relevant Orders    Follow Up In [facility] - PCP - Established    CBC and Auto Differential    Comprehensive Metabolic Panel    Lipid Panel    Vitamin D 25-Hydroxy,Total (for eval of Vitamin D levels)    Vitamin D deficiency        Relevant Orders    Follow Up In [facility] - PCP - Established    Vitamin D 25-Hydroxy,Total (for eval of Vitamin D levels)    Allergic rhinitis, unspecified seasonality, unspecified trigger        Relevant Orders    Follow Up In [facility] - PCP - Established    Encounter for screening mammogram for malignant neoplasm of breast        Relevant Orders    BI mammo bilateral screening tomosynthesis    Follow Up In [facility] - PCP - Established    UTI symptoms        Relevant Orders    POCT UA Automated manually resulted (Completed)    Follow Up In [facility] - PCP - Established

## 2024-04-22 NOTE — PROGRESS NOTES
Subjective   Reason for Visit: Dora Guillermo is an 79 y.o. female here for a Medicare Wellness visit.     Past Medical, Surgical, and Family History reviewed and updated in chart.    Reviewed all medications by prescribing practitioner or clinical pharmacist (such as prescriptions, OTCs, herbal therapies and supplements) and documented in the medical record.    HPI  Patient is here for Medicare Wellness visit.  Patient is following up on Hypertension and Hyperlipidemia.  She is having increased incontinence x 1 months. She did not have a script currently.     Patient was seen at [facility] on 3/26/24 for UTI and was first placed on Bactrim but started having upset stomach and light headedness. She was changed to Cefdinir. The antibiotic did clear her UTI symptoms.     Patient Care Team:  Bassam Zafar MD as PCP - General (Family Medicine)  Bassam Zafar MD as PCP - [employer] PCP     Review of Systems  12 Systems have been reviewed as follows.  Constitutional: Fever, weight gain, weight loss, appetite change, night sweats, fatigue, chills.  Eyes : blurry, double vision, vision, loss, tearing, redness, pain, sensitivity to light, glaucoma.  Ears, nose, mouth, and throat: Hearing loss, ringing in the ears, ear pain, nasal congestion, nasal drainage, nosebleeds, mouth, throat, irritation tooth problem.  Cardiovascular :chest pain, pressure, heart racing, palpitations, sweating, leg swelling, high or low blood pressure  Pulmonary: Cough, yellow or green sputum, blood and sputum, shortness of breath, wheezing  Gastrointestinal: Nausea, vomiting, diarrhea, constipation, pain, blood in stool, or vomitus, heartburn, difficulty swallowing  Genitourinary: incontinence, abnormal bleeding, abnormal discharge, urinary frequency, urinary hesitancy, pain, impotence sexual problem, infection, urinary retention  Musculoskeletal: Pain, stiffness, joint, redness or warmth, arthritis, back pain,  "weakness, muscle wasting, sprain or fracture  Neuro: Weight weakness, dizziness, change in voice, change in taste change in vision, change in hearing, loss, or change of sensation, trouble walking, balance problems coordination problems, shaking, speech problem  Endocrine , cold or heat intolerance, blood sugar problem, weight gain or loss missed periods hot flashes, sweats, change in body hair, change in libido, increased thirst, increased urination  Heme/lymph: Swelling, bleeding, problem anemia, bruising, enlarged lymph nodes  Allergic/immunologic: H. plus nasal drip, watery itchy eyes, nasal drainage, immunosuppressed  The above were reviewed and noted negative except as noted in HPI and Problem List.      Objective   Vitals:  /82 (BP Location: Left arm, Patient Position: Sitting, BP Cuff Size: Adult)   Pulse 67   Temp 36.4 °C (97.6 °F) (Temporal)   Resp 16   Ht 1.676 m (5' 6\")   Wt 112 kg (247 lb 6.4 oz)   SpO2 94%   BMI 39.93 kg/m²       Physical Exam  Constitutional: Well developed, well nourished, alert and in no acute distress   Eyes: Normal external exam. Pupils equally round and reactive to light with normal accommodation and extraocular movements intact.  Neck: Supple, no lymphadenopathy or masses.   Cardiovascular: Regular rate and rhythm, normal S1 and S2, no murmurs, gallops, or rubs. Radial pulses normal. No peripheral edema.  Pulmonary: No respiratory distress, lungs clear to auscultation bilaterally. No wheezes, rhonchi, rales.  Abdomen: soft,non tender, non distended, without masses or HSM  Skin: Warm, well perfused, normal skin turgor and color.   Neurologic: Cranial nerves II-XII grossly intact.   Psychiatric: Mood calm and affect normal  Musculoskeletal: Moving all extremities without restriction      Assessment/Plan   Problem List Items Addressed This Visit       CKD stage G3a/A1, GFR 45-59 and albumin creatinine ratio <30 mg/g (Multi)    Relevant Orders    Follow Up In [facility] - PCP - Established    Hyperlipidemia    Relevant Orders    Follow Up In [facility] - PCP - Established    Comprehensive Metabolic Panel    Lipid Panel    Hypertension    Relevant Orders    Follow Up In [facility] - PCP - Established    CBC and Auto Differential    Primary osteoarthritis of right knee    Relevant Orders    Follow Up In [facility] - PCP - Established    History of total right knee replacement    Overview     R KNEE REPLACED IN APRIL SWOLLEN R/O BLOOD CLOT         Relevant Orders    Follow Up In [facility] - PCP - Established    Osteoarthritis of lumbar spine    Relevant Orders    Follow Up In [facility] - PCP - Established     Other Visit Diagnoses       Routine general medical examination at health care facility    -  Primary    Encounter for annual wellness exam in Medicare patient        Relevant Orders    Follow Up In [facility] - PCP - Established    CBC and Auto Differential    Comprehensive Metabolic Panel    Lipid Panel    Vitamin D 25-Hydroxy,Total (for eval of Vitamin D levels)    Healthcare maintenance        Relevant Orders    Follow Up In [facility] - PCP - Established    CBC and Auto Differential    Comprehensive Metabolic Panel    Lipid Panel    Vitamin D 25-Hydroxy,Total (for eval of Vitamin D levels)    Vitamin D deficiency        Relevant Orders    Follow Up In [facility] - PCP - Established    Vitamin D 25-Hydroxy,Total (for eval of Vitamin D levels)    Allergic rhinitis, unspecified seasonality, unspecified trigger        Relevant Orders    Follow Up In [facility] - PCP - Established    Encounter for screening mammogram for malignant neoplasm of breast        Relevant Orders    BI mammo bilateral screening tomosynthesis    Follow Up In [facility] - PCP - Established    UTI symptoms        Relevant Medications    nitrofurantoin, macrocrystal-monohydrate,  (Macrobid) 100 mg capsule    Other Relevant Orders    POCT UA Automated manually resulted (Completed)    Follow Up In [facility] - PCP - Established        Scribe Attestation  By signing my name below, I, Bassam Zafar MD , Scribe   attest that this documentation has been prepared under the direction and in the presence of Bassam Zafar MD.    Provider Attestation - Scribe documentation    All medical record entries made by the Scribe were at my direction and personally dictated by me. I have reviewed the chart and agree that the record accurately reflects my personal performance of the history, physical exam, discussion and plan.     Continue Lipitor 40 mg daily    BW to be done prior    Left Shoulder has improved     Do thyroid screen once per year.     Start macrobid 100 mg bid #20     Blood work ordered prior to next visit     UA NEXT VISIT

## 2024-04-24 DIAGNOSIS — Z96.651 S/P TOTAL KNEE ARTHROPLASTY, RIGHT: Primary | ICD-10-CM

## 2024-04-24 NOTE — PROGRESS NOTES
Chief Complaint   Patient presents with    Right Knee - Follow-up     TKA 4/26/24       The patient is here for follow-up of their side: right knee arthroplasty.  The patient has no knee pain.  The patient has no mechanical symptoms.  The patient has no swelling.  The patient is approximately 1 year(s) postop    Physical examination:    Examination of the side: right knee  The incision is  healed  No erythema or warmth.  No instability varus or valgus stressing the knee at 0, 30 or 60 degrees.  No instability in the AP plane at 90 degrees.  Range of motion: 0 degrees extension, 120 degrees flexion  There is no tenderness  Calf is soft, Homans negative  The patient has intact ankle dorsiflexion and plantarflexion.    Radiographs:   XR knee right 3 views  Interpreted By:  Brian Osborne,   STUDY:  XR KNEE RIGHT 3 VIEWS; ; 4/25/2024 8:16 am      INDICATION:  Signs/Symptoms:pain.      ACCESSION NUMBER(S):  [id number]      ORDERING CLINICIAN:  BRIAN OSBORNE      FINDINGS:  Right knee films show a total knee arthroplasty in satisfactory  position. The patella is well positioned. No fracture is identified.  No obvious loosening or wear is appreciated.          Signed by: Brian Osborne 4/25/2024 8:24 AM  Dictation workstation:   [id number]        Impression:  Status post side: right total knee arthroplasty    Plan:  Discussed the importance of prophylactic dental antibiotics  Amoxicillin sent to her pharmacy for upcoming dental work  Continue activities as tolerated  Follow up in 1 year  All questions answered

## 2024-04-25 ENCOUNTER — OFFICE VISIT (OUTPATIENT)
Dept: ORTHOPEDIC SURGERY | Facility: CLINIC | Age: 79
End: 2024-04-25
Payer: COMMERCIAL

## 2024-04-25 ENCOUNTER — HOSPITAL ENCOUNTER (OUTPATIENT)
Dept: RADIOLOGY | Facility: CLINIC | Age: 79
Discharge: HOME | End: 2024-04-25
Payer: COMMERCIAL

## 2024-04-25 ENCOUNTER — HOSPITAL ENCOUNTER (OUTPATIENT)
Dept: RADIOLOGY | Facility: HOSPITAL | Age: 79
Discharge: HOME | End: 2024-04-25
Payer: COMMERCIAL

## 2024-04-25 DIAGNOSIS — Z96.651 TOTAL KNEE REPLACEMENT STATUS, RIGHT: ICD-10-CM

## 2024-04-25 DIAGNOSIS — M17.11 PRIMARY OSTEOARTHRITIS OF RIGHT KNEE: ICD-10-CM

## 2024-04-25 DIAGNOSIS — Z79.2 PROPHYLACTIC ANTIBIOTIC: ICD-10-CM

## 2024-04-25 DIAGNOSIS — Z12.31 ENCOUNTER FOR SCREENING MAMMOGRAM FOR MALIGNANT NEOPLASM OF BREAST: ICD-10-CM

## 2024-04-25 DIAGNOSIS — Z96.651 S/P TOTAL KNEE ARTHROPLASTY, RIGHT: ICD-10-CM

## 2024-04-25 PROCEDURE — 99213 OFFICE O/P EST LOW 20 MIN: CPT | Performed by: ORTHOPAEDIC SURGERY

## 2024-04-25 PROCEDURE — 77067 SCR MAMMO BI INCL CAD: CPT | Performed by: STUDENT IN AN ORGANIZED HEALTH CARE EDUCATION/TRAINING PROGRAM

## 2024-04-25 PROCEDURE — 77067 SCR MAMMO BI INCL CAD: CPT

## 2024-04-25 PROCEDURE — 1157F ADVNC CARE PLAN IN RCRD: CPT | Performed by: ORTHOPAEDIC SURGERY

## 2024-04-25 PROCEDURE — 1159F MED LIST DOCD IN RCRD: CPT | Performed by: ORTHOPAEDIC SURGERY

## 2024-04-25 PROCEDURE — 1160F RVW MEDS BY RX/DR IN RCRD: CPT | Performed by: ORTHOPAEDIC SURGERY

## 2024-04-25 PROCEDURE — 73562 X-RAY EXAM OF KNEE 3: CPT | Mod: RIGHT SIDE | Performed by: ORTHOPAEDIC SURGERY

## 2024-04-25 PROCEDURE — 77063 BREAST TOMOSYNTHESIS BI: CPT | Performed by: STUDENT IN AN ORGANIZED HEALTH CARE EDUCATION/TRAINING PROGRAM

## 2024-04-25 PROCEDURE — 73562 X-RAY EXAM OF KNEE 3: CPT | Mod: RT

## 2024-04-25 RX ORDER — AMOXICILLIN 500 MG/1
CAPSULE ORAL
Qty: 4 CAPSULE | Refills: 3 | Status: SHIPPED | OUTPATIENT
Start: 2024-04-25 | End: 2024-04-25

## 2024-04-25 RX ORDER — AMOXICILLIN 500 MG/1
CAPSULE ORAL
Qty: 4 CAPSULE | Refills: 3 | Status: SHIPPED | OUTPATIENT
Start: 2024-04-25

## 2024-05-07 ENCOUNTER — LAB (OUTPATIENT)
Dept: LAB | Facility: LAB | Age: 79
End: 2024-05-07
Payer: COMMERCIAL

## 2024-05-07 DIAGNOSIS — Z00.00 ENCOUNTER FOR ANNUAL WELLNESS EXAM IN MEDICARE PATIENT: ICD-10-CM

## 2024-05-07 DIAGNOSIS — I10 PRIMARY HYPERTENSION: ICD-10-CM

## 2024-05-07 DIAGNOSIS — E55.9 VITAMIN D DEFICIENCY: ICD-10-CM

## 2024-05-07 DIAGNOSIS — E78.2 MIXED HYPERLIPIDEMIA: ICD-10-CM

## 2024-05-07 DIAGNOSIS — Z00.00 HEALTHCARE MAINTENANCE: ICD-10-CM

## 2024-05-07 LAB
25(OH)D3 SERPL-MCNC: 64 NG/ML (ref 30–100)
ALBUMIN SERPL BCP-MCNC: 4.8 G/DL (ref 3.4–5)
ALP SERPL-CCNC: 61 U/L (ref 33–136)
ALT SERPL W P-5'-P-CCNC: 20 U/L (ref 7–45)
ANION GAP SERPL CALC-SCNC: 12 MMOL/L (ref 10–20)
AST SERPL W P-5'-P-CCNC: 20 U/L (ref 9–39)
BASOPHILS # BLD AUTO: 0.05 X10*3/UL (ref 0–0.1)
BASOPHILS NFR BLD AUTO: 1 %
BILIRUB SERPL-MCNC: 0.6 MG/DL (ref 0–1.2)
BUN SERPL-MCNC: 24 MG/DL (ref 6–23)
CALCIUM SERPL-MCNC: 9.8 MG/DL (ref 8.6–10.3)
CHLORIDE SERPL-SCNC: 104 MMOL/L (ref 98–107)
CHOLEST SERPL-MCNC: 153 MG/DL (ref 0–199)
CHOLESTEROL/HDL RATIO: 2.3
CO2 SERPL-SCNC: 28 MMOL/L (ref 21–32)
CREAT SERPL-MCNC: 1.12 MG/DL (ref 0.5–1.05)
EGFRCR SERPLBLD CKD-EPI 2021: 50 ML/MIN/1.73M*2
EOSINOPHIL # BLD AUTO: 0.38 X10*3/UL (ref 0–0.4)
EOSINOPHIL NFR BLD AUTO: 7.5 %
ERYTHROCYTE [DISTWIDTH] IN BLOOD BY AUTOMATED COUNT: 14.3 % (ref 11.5–14.5)
GLUCOSE SERPL-MCNC: 95 MG/DL (ref 74–99)
HCT VFR BLD AUTO: 42.2 % (ref 36–46)
HDLC SERPL-MCNC: 66 MG/DL
HGB BLD-MCNC: 13.6 G/DL (ref 12–16)
IMM GRANULOCYTES # BLD AUTO: 0.01 X10*3/UL (ref 0–0.5)
IMM GRANULOCYTES NFR BLD AUTO: 0.2 % (ref 0–0.9)
LDLC SERPL CALC-MCNC: 57 MG/DL
LYMPHOCYTES # BLD AUTO: 1.48 X10*3/UL (ref 0.8–3)
LYMPHOCYTES NFR BLD AUTO: 29.1 %
MCH RBC QN AUTO: 28.6 PG (ref 26–34)
MCHC RBC AUTO-ENTMCNC: 32.2 G/DL (ref 32–36)
MCV RBC AUTO: 89 FL (ref 80–100)
MONOCYTES # BLD AUTO: 0.51 X10*3/UL (ref 0.05–0.8)
MONOCYTES NFR BLD AUTO: 10 %
NEUTROPHILS # BLD AUTO: 2.66 X10*3/UL (ref 1.6–5.5)
NEUTROPHILS NFR BLD AUTO: 52.2 %
NON HDL CHOLESTEROL: 87 MG/DL (ref 0–149)
NRBC BLD-RTO: 0 /100 WBCS (ref 0–0)
PLATELET # BLD AUTO: 267 X10*3/UL (ref 150–450)
POTASSIUM SERPL-SCNC: 4.6 MMOL/L (ref 3.5–5.3)
PROT SERPL-MCNC: 7.2 G/DL (ref 6.4–8.2)
RBC # BLD AUTO: 4.75 X10*6/UL (ref 4–5.2)
SODIUM SERPL-SCNC: 139 MMOL/L (ref 136–145)
TRIGL SERPL-MCNC: 150 MG/DL (ref 0–149)
VLDL: 30 MG/DL (ref 0–40)
WBC # BLD AUTO: 5.1 X10*3/UL (ref 4.4–11.3)

## 2024-05-07 PROCEDURE — 36415 COLL VENOUS BLD VENIPUNCTURE: CPT

## 2024-05-07 PROCEDURE — 85025 COMPLETE CBC W/AUTO DIFF WBC: CPT

## 2024-05-07 PROCEDURE — 80061 LIPID PANEL: CPT

## 2024-05-07 PROCEDURE — 82306 VITAMIN D 25 HYDROXY: CPT

## 2024-05-07 PROCEDURE — 80053 COMPREHEN METABOLIC PANEL: CPT

## 2024-05-14 ENCOUNTER — OFFICE VISIT (OUTPATIENT)
Dept: PRIMARY CARE | Facility: CLINIC | Age: 79
End: 2024-05-14
Payer: COMMERCIAL

## 2024-05-14 VITALS
HEART RATE: 76 BPM | SYSTOLIC BLOOD PRESSURE: 128 MMHG | WEIGHT: 248 LBS | HEIGHT: 65 IN | DIASTOLIC BLOOD PRESSURE: 74 MMHG | OXYGEN SATURATION: 98 % | TEMPERATURE: 97.2 F | BODY MASS INDEX: 41.32 KG/M2

## 2024-05-14 DIAGNOSIS — R39.9 UTI SYMPTOMS: ICD-10-CM

## 2024-05-14 DIAGNOSIS — E78.2 MIXED HYPERLIPIDEMIA: ICD-10-CM

## 2024-05-14 DIAGNOSIS — E55.9 VITAMIN D DEFICIENCY: ICD-10-CM

## 2024-05-14 DIAGNOSIS — Z00.00 HEALTHCARE MAINTENANCE: ICD-10-CM

## 2024-05-14 DIAGNOSIS — Z96.651 HISTORY OF TOTAL RIGHT KNEE REPLACEMENT: ICD-10-CM

## 2024-05-14 DIAGNOSIS — I10 PRIMARY HYPERTENSION: ICD-10-CM

## 2024-05-14 DIAGNOSIS — M17.11 PRIMARY OSTEOARTHRITIS OF RIGHT KNEE: ICD-10-CM

## 2024-05-14 DIAGNOSIS — N18.31 CKD STAGE G3A/A1, GFR 45-59 AND ALBUMIN CREATININE RATIO <30 MG/G (MULTI): ICD-10-CM

## 2024-05-14 DIAGNOSIS — J30.9 ALLERGIC RHINITIS, UNSPECIFIED SEASONALITY, UNSPECIFIED TRIGGER: ICD-10-CM

## 2024-05-14 DIAGNOSIS — M47.26 OSTEOARTHRITIS OF SPINE WITH RADICULOPATHY, LUMBAR REGION: ICD-10-CM

## 2024-05-14 DIAGNOSIS — Z12.31 ENCOUNTER FOR SCREENING MAMMOGRAM FOR MALIGNANT NEOPLASM OF BREAST: ICD-10-CM

## 2024-05-14 DIAGNOSIS — Z00.00 ENCOUNTER FOR ANNUAL WELLNESS EXAM IN MEDICARE PATIENT: ICD-10-CM

## 2024-05-14 PROBLEM — J30.2 SEASONAL ALLERGIES: Status: RESOLVED | Noted: 2023-02-05 | Resolved: 2024-05-14

## 2024-05-14 PROCEDURE — 99214 OFFICE O/P EST MOD 30 MIN: CPT | Performed by: FAMILY MEDICINE

## 2024-05-14 ASSESSMENT — ENCOUNTER SYMPTOMS
DYSPHORIC MOOD: 0
CHEST TIGHTNESS: 0
SPEECH DIFFICULTY: 0
ADENOPATHY: 0
MYALGIAS: 0
HEMATURIA: 0
COLOR CHANGE: 0
FATIGUE: 0
AGITATION: 0
COUGH: 0
ABDOMINAL DISTENTION: 0
POLYPHAGIA: 0
CONFUSION: 0
SHORTNESS OF BREATH: 0
DIARRHEA: 0
SINUS PRESSURE: 0
RECTAL PAIN: 0
SORE THROAT: 0
SLEEP DISTURBANCE: 0
HEADACHES: 0
DECREASED CONCENTRATION: 0
PALPITATIONS: 0
TROUBLE SWALLOWING: 0
DIZZINESS: 0
ACTIVITY CHANGE: 0
NECK STIFFNESS: 0
EYE PAIN: 0
RHINORRHEA: 0
SINUS PAIN: 0
ABDOMINAL PAIN: 0
STRIDOR: 0
FLANK PAIN: 0
CONSTIPATION: 0
NERVOUS/ANXIOUS: 0
BLOOD IN STOOL: 0
CONSTITUTIONAL NEGATIVE: 1
SEIZURES: 0
ARTHRALGIAS: 0
POLYDIPSIA: 0
FEVER: 0
DYSURIA: 0
APPETITE CHANGE: 0
PHOTOPHOBIA: 0

## 2024-05-14 NOTE — PROGRESS NOTES
"Subjective   Patient ID: Dora Guillermo is a 79 y.o. female who presents for UTI and Results.    HPI Pt was seen 04/22/2024 for UTI,pt states she has no symptoms today.    Pt had blood work done 05/04/2024 and would like to review the results today.    -[facility]    Review of Systems   Constitutional: Negative.  Negative for activity change, appetite change, fatigue and fever.   HENT:  Negative for congestion, dental problem, ear discharge, ear pain, mouth sores, rhinorrhea, sinus pressure, sinus pain, sore throat, tinnitus and trouble swallowing.    Eyes:  Negative for photophobia, pain and visual disturbance.   Respiratory:  Negative for cough, chest tightness, shortness of breath and stridor.    Cardiovascular:  Negative for chest pain and palpitations.   Gastrointestinal:  Negative for abdominal distention, abdominal pain, blood in stool, constipation, diarrhea and rectal pain.   Endocrine: Negative for cold intolerance, heat intolerance, polydipsia, polyphagia and polyuria.   Genitourinary:  Negative for dysuria, flank pain, hematuria and urgency.   Musculoskeletal:  Negative for arthralgias, gait problem, myalgias and neck stiffness.   Skin:  Negative for color change and rash.   Allergic/Immunologic: Negative for environmental allergies and food allergies.   Neurological:  Negative for dizziness, seizures, syncope, speech difficulty and headaches.   Hematological:  Negative for adenopathy.   Psychiatric/Behavioral:  Negative for agitation, confusion, decreased concentration, dysphoric mood and sleep disturbance. The patient is not nervous/anxious.        Objective   /74 (BP Location: Left arm, Patient Position: Sitting, BP Cuff Size: Adult)   Pulse 76   Temp 36.2 °C (97.2 °F)   Ht 1.651 m (5' 5\")   Wt 112 kg (248 lb)   SpO2 98%   BMI 41.27 kg/m²     Physical Exam  Constitutional: Well developed, well nourished, alert and in no acute distress   Eyes: Normal external exam. Pupils equally round and " reactive to light with normal accommodation and extraocular movements intact.  Neck: Supple, no lymphadenopathy or masses.   Cardiovascular: Regular rate and rhythm, normal S1 and S2, no murmurs, gallops, or rubs. Radial pulses normal. No peripheral edema.  Pulmonary: No respiratory distress, lungs clear to auscultation bilaterally. No wheezes, rhonchi, rales.  Abdomen: soft,non tender, non distended, without masses or HSM  Skin: Warm, well perfused, normal skin turgor and color.   Neurologic: Cranial nerves II-XII grossly intact.   Psychiatric: Mood calm and affect normal  Musculoskeletal: Moving all extremities without restriction    Assessment/Plan   Problem List Items Addressed This Visit             ICD-10-CM    CKD stage G3a/A1, GFR 45-59 and albumin creatinine ratio <30 mg/g (Multi) N18.31    Relevant Orders    Follow Up In [facility] - PCP - Established    Hyperlipidemia E78.5    Relevant Orders    Follow Up In [facility] - PCP - Established    Hypertension I10    Relevant Orders    Follow Up In [facility] - PCP - Established    Primary osteoarthritis of right knee M17.11    Relevant Orders    Follow Up In [facility] - PCP - Established    History of total right knee replacement Z96.651    Relevant Orders    Follow Up In [facility] - PCP - Established    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    Relevant Orders    Follow Up In [facility] - PCP - Established     Other Visit Diagnoses         Codes    Encounter for annual wellness exam in Medicare patient     Z00.00    Relevant Orders    Follow Up In [facility] - PCP - Established    Healthcare maintenance     Z00.00    Relevant Orders    Follow Up In [facility] - PCP - Established    Vitamin D deficiency     E55.9    Relevant Orders    Follow Up In [facility] - PCP - Established    Allergic rhinitis, unspecified seasonality, unspecified trigger     J30.9     Relevant Orders    Follow Up In [facility] - PCP - Established    Encounter for screening mammogram for malignant neoplasm of breast     Z12.31    Relevant Orders    Follow Up In [facility] - PCP - Established    UTI symptoms     R39.9    Relevant Orders    Follow Up In [facility] - PCP - Established             Scribe Attestation  By signing my name below, I, Nico Ansari MA   attest that this documentation has been prepared under the direction and in the presence of Bassam Zafar MD.    Provider Attestation - Scribe documentation    All medical record entries made by the Scribe were at my direction and personally dictated by me. I have reviewed the chart and agree that the record accurately reflects my personal performance of the history, physical exam, discussion and plan.    Continue current medications and therapy for chronic medical conditions     Continue Lipitor 40 mg daily     BW to be done prior     Left Shoulder has improved      Do thyroid screen once per year.      Uti RESOLVED     UA NEXT VISIT

## 2024-06-14 ENCOUNTER — OFFICE VISIT (OUTPATIENT)
Dept: PRIMARY CARE | Facility: CLINIC | Age: 79
End: 2024-06-14
Payer: COMMERCIAL

## 2024-06-14 VITALS
BODY MASS INDEX: 39.53 KG/M2 | HEART RATE: 78 BPM | RESPIRATION RATE: 16 BRPM | DIASTOLIC BLOOD PRESSURE: 78 MMHG | HEIGHT: 66 IN | WEIGHT: 246 LBS | OXYGEN SATURATION: 95 % | SYSTOLIC BLOOD PRESSURE: 124 MMHG | TEMPERATURE: 97.2 F

## 2024-06-14 DIAGNOSIS — R05.9 COUGH IN ADULT PATIENT: ICD-10-CM

## 2024-06-14 DIAGNOSIS — J00 NASOPHARYNGITIS: ICD-10-CM

## 2024-06-14 DIAGNOSIS — E66.9 CLASS 2 OBESITY WITH BODY MASS INDEX (BMI) OF 39.0 TO 39.9 IN ADULT, UNSPECIFIED OBESITY TYPE, UNSPECIFIED WHETHER SERIOUS COMORBIDITY PRESENT: ICD-10-CM

## 2024-06-14 DIAGNOSIS — R09.81 NASAL CONGESTION WITH RHINORRHEA: Primary | ICD-10-CM

## 2024-06-14 DIAGNOSIS — J34.89 NASAL CONGESTION WITH RHINORRHEA: Primary | ICD-10-CM

## 2024-06-14 PROCEDURE — 99213 OFFICE O/P EST LOW 20 MIN: CPT | Performed by: NURSE PRACTITIONER

## 2024-06-14 RX ORDER — BROMPHENIRAMINE MALEATE, PSEUDOEPHEDRINE HYDROCHLORIDE, AND DEXTROMETHORPHAN HYDROBROMIDE 2; 30; 10 MG/5ML; MG/5ML; MG/5ML
5 SYRUP ORAL 4 TIMES DAILY PRN
Qty: 120 ML | Refills: 2 | Status: SHIPPED | OUTPATIENT
Start: 2024-06-14 | End: 2024-06-24

## 2024-06-14 RX ORDER — CEFDINIR 300 MG/1
300 CAPSULE ORAL 2 TIMES DAILY
Qty: 20 CAPSULE | Refills: 0 | Status: SHIPPED | OUTPATIENT
Start: 2024-06-14 | End: 2024-06-24

## 2024-06-14 ASSESSMENT — PATIENT HEALTH QUESTIONNAIRE - PHQ9
2. FEELING DOWN, DEPRESSED OR HOPELESS: NOT AT ALL
SUM OF ALL RESPONSES TO PHQ9 QUESTIONS 1 AND 2: 0
2. FEELING DOWN, DEPRESSED OR HOPELESS: NOT AT ALL
SUM OF ALL RESPONSES TO PHQ9 QUESTIONS 1 AND 2: 0
2. FEELING DOWN, DEPRESSED OR HOPELESS: NOT AT ALL
SUM OF ALL RESPONSES TO PHQ9 QUESTIONS 1 AND 2: 0
1. LITTLE INTEREST OR PLEASURE IN DOING THINGS: NOT AT ALL

## 2024-06-14 ASSESSMENT — ENCOUNTER SYMPTOMS
LOSS OF SENSATION IN FEET: 0
DEPRESSION: 0
OCCASIONAL FEELINGS OF UNSTEADINESS: 0

## 2024-06-14 NOTE — PROGRESS NOTES
Subjective   Patient ID: Dora Guillermo is a 79 y.o. female who is with a chief complaint of symptoms of respiratory tract infection.     HPI  Patient is a 79 y.o. female who CONSULTED AT [facility] today. Patient is with complaints of nasal congestion, nasal discharge, frontal sinus pain, sore throat, painful swallowing (gone now), productive cough, fatigue, chills, and fever. She denies having any headache, muscle ache, loss of sense of taste, loss of sense of smell, nor diarrhea. Patient states that present condition started about 3 days ago after being exposed to her granddaughter who is having similar symptoms. she denies shortness of breath, chest pain, palpitations, nor edema. she stated that she  tried OTC medications which afforded only slight relief of symptoms. she denies nausea, vomiting, abdominal pain, nor any other symptoms.    Patient states she had her COVID vaccine.  Patient states she had the flu shot for this season.    Review of Systems  General: no weight loss, generally healthy, (+) fatigue  Head:  no headache, (+) frontal sinus pain, no vertigo, no injury  Eyes: no diplopia, no tearing, no pain,   Ears: no change in hearing, no tinnitus, no bleeding, no vertigo  Mouth:  no dental difficulties, no gingival bleeding, (+) sore throat, no loss of sense of taste, (+) hx painful swallowing,   Nose: (+) congestion, (+) discharge, no bleeding, no obstruction, no loss of sense of smell  Neck: no stiffness, no pain, no tenderness, no masses, no bruit  Pulmonary: no dyspnea, no wheezing, no hemoptysis, (+) productive cough  Cardiovascular: no chest pain, no palpitations, no syncope, no orthopnea  Gastrointestinal: no change in appetite, no dysphagia, no abdominal pains, no diarrhea, no emesis, no melena  Genito Urinary: no dysuria, no urinary urgency, no nocturia, no incontinence, no change in nature of urine  Musculoskeletal: no muscle ache, no joint pain, no limitation of range  of motion, no paresthesia, no numbness  Constitutional: (+) fever, (+) chills, no night sweats    Objective   Physical Exam  General: ambulatory, in no acute distress  Head: normocephalic, no lesions, no sinus tenderness  Eyes: pink palpebral conjunctiva, anicteric sclerae, PERRLA, EOM's full  Ears: clear external auditory canals, no ear discharge, no bleeding from the ears, tympanic membrane intact  Nose: (+) congested nasal mucosa, (+) yellow mucoid nasal discharge, no bleeding, no obstruction  Throat: (+) erythema, and (+) exudate on posterior pharyngeal wall, no lesion  Neck: supple, no masses, no bruits, no CLADP  Chest: symmetrical chest expansion, no lagging, no retractions, clear breath sounds, no rales, no wheezes    Assessment/Plan   Problem List Items Addressed This Visit    None  Visit Diagnoses         Codes    Nasal congestion with rhinorrhea    -  Primary R09.81, J34.89    Relevant Medications    brompheniramine-pseudoeph-DM 2-30-10 mg/5 mL syrup    cefdinir (Omnicef) 300 mg capsule    Nasopharyngitis     J00    Relevant Medications    brompheniramine-pseudoeph-DM 2-30-10 mg/5 mL syrup    cefdinir (Omnicef) 300 mg capsule    Cough in adult patient     R05.9    Relevant Medications    brompheniramine-pseudoeph-DM 2-30-10 mg/5 mL syrup    cefdinir (Omnicef) 300 mg capsule    BMI 39.0-39.9,adult     Z68.39    Class 2 obesity with body mass index (BMI) of 39.0 to 39.9 in adult, unspecified obesity type, unspecified whether serious comorbidity present     E66.9, Z68.39        DISCHARGE SUMMARY:   Patient was seen and examined. Diagnosis, treatment, treatment options, and possible complications of today's illness discussed and explained to patient. Patient to take medication/s associated with this visit. Patient may also take OTC analgesic/antipyretic if needed for pain/fever. Advised to increase oral fluid intake. Advised steam inhalation if needed to relieve congestion. Advised warm saline gargle if  needed to relieve throat discomfort. Advised Listerine antiseptic mouthwash gargle TID. Patient may use Cepacol oral spray as needed to relieve throat discomfort. Patient was advised to discard the old toothbrush and use a new toothbrush beginning on the third of antibiotics. Advised to come back if with worsening or persistent symptoms. Patient verbalized understanding of plan of care.    Patient to come back in 7 - 10 days if needed for worsening symptoms.           MYLA Alaniz-CNP 06/14/24 11:50 AM

## 2024-06-14 NOTE — PROGRESS NOTES
"Subjective   Patient ID: Dora Guillermo is a 79 y.o. female who presents for URI.      Symptoms: cough, congestion, runny nose, sinus pressure, sore throat, fever 99.8,  Length of symptoms: 2 days ago  OTC: ibuprofen with mild help.  Related information:    HPI     Review of Systems    Objective   /78 Comment: auto  Pulse 78   Temp 36.2 °C (97.2 °F)   Resp 16   Ht 1.676 m (5' 6\")   Wt 112 kg (246 lb)   SpO2 95%   BMI 39.71 kg/m²     Physical Exam    Assessment/Plan          "

## 2024-06-20 DIAGNOSIS — E78.2 MIXED HYPERLIPIDEMIA: ICD-10-CM

## 2024-06-21 RX ORDER — ATORVASTATIN CALCIUM 40 MG/1
40 TABLET, FILM COATED ORAL DAILY
Qty: 90 TABLET | Refills: 1 | Status: SHIPPED | OUTPATIENT
Start: 2024-06-21

## 2024-06-29 DIAGNOSIS — I10 PRIMARY HYPERTENSION: ICD-10-CM

## 2024-07-01 RX ORDER — AMLODIPINE BESYLATE 5 MG/1
5 TABLET ORAL DAILY
Qty: 90 TABLET | Refills: 1 | Status: SHIPPED | OUTPATIENT
Start: 2024-07-01

## 2024-07-19 ENCOUNTER — APPOINTMENT (OUTPATIENT)
Dept: PRIMARY CARE | Facility: CLINIC | Age: 79
End: 2024-07-19
Payer: COMMERCIAL

## 2024-07-19 VITALS
OXYGEN SATURATION: 97 % | HEIGHT: 66 IN | WEIGHT: 247.6 LBS | SYSTOLIC BLOOD PRESSURE: 118 MMHG | HEART RATE: 58 BPM | RESPIRATION RATE: 16 BRPM | DIASTOLIC BLOOD PRESSURE: 74 MMHG | BODY MASS INDEX: 39.79 KG/M2

## 2024-07-19 DIAGNOSIS — Z12.12 ENCOUNTER FOR COLORECTAL CANCER SCREENING USING COLOGUARD TEST: ICD-10-CM

## 2024-07-19 DIAGNOSIS — J30.9 ALLERGIC RHINITIS, UNSPECIFIED SEASONALITY, UNSPECIFIED TRIGGER: ICD-10-CM

## 2024-07-19 DIAGNOSIS — N18.31 CKD STAGE G3A/A1, GFR 45-59 AND ALBUMIN CREATININE RATIO <30 MG/G (MULTI): ICD-10-CM

## 2024-07-19 DIAGNOSIS — M17.11 PRIMARY OSTEOARTHRITIS OF RIGHT KNEE: ICD-10-CM

## 2024-07-19 DIAGNOSIS — Z00.00 ENCOUNTER FOR ANNUAL WELLNESS EXAM IN MEDICARE PATIENT: ICD-10-CM

## 2024-07-19 DIAGNOSIS — Z96.651 HISTORY OF TOTAL RIGHT KNEE REPLACEMENT: ICD-10-CM

## 2024-07-19 DIAGNOSIS — Z12.11 ENCOUNTER FOR COLORECTAL CANCER SCREENING USING COLOGUARD TEST: ICD-10-CM

## 2024-07-19 DIAGNOSIS — M47.26 OSTEOARTHRITIS OF SPINE WITH RADICULOPATHY, LUMBAR REGION: ICD-10-CM

## 2024-07-19 DIAGNOSIS — E78.2 MIXED HYPERLIPIDEMIA: ICD-10-CM

## 2024-07-19 DIAGNOSIS — E55.9 VITAMIN D DEFICIENCY: ICD-10-CM

## 2024-07-19 DIAGNOSIS — Z00.00 HEALTHCARE MAINTENANCE: ICD-10-CM

## 2024-07-19 DIAGNOSIS — B35.4 TINEA CORPORIS: Primary | ICD-10-CM

## 2024-07-19 DIAGNOSIS — R39.9 UTI SYMPTOMS: ICD-10-CM

## 2024-07-19 DIAGNOSIS — I10 PRIMARY HYPERTENSION: ICD-10-CM

## 2024-07-19 DIAGNOSIS — Z12.31 ENCOUNTER FOR SCREENING MAMMOGRAM FOR MALIGNANT NEOPLASM OF BREAST: ICD-10-CM

## 2024-07-19 PROCEDURE — 99214 OFFICE O/P EST MOD 30 MIN: CPT | Performed by: FAMILY MEDICINE

## 2024-07-19 PROCEDURE — 1036F TOBACCO NON-USER: CPT | Performed by: FAMILY MEDICINE

## 2024-07-19 PROCEDURE — 1157F ADVNC CARE PLAN IN RCRD: CPT | Performed by: FAMILY MEDICINE

## 2024-07-19 PROCEDURE — 1160F RVW MEDS BY RX/DR IN RCRD: CPT | Performed by: FAMILY MEDICINE

## 2024-07-19 PROCEDURE — 1123F ACP DISCUSS/DSCN MKR DOCD: CPT | Performed by: FAMILY MEDICINE

## 2024-07-19 PROCEDURE — 3078F DIAST BP <80 MM HG: CPT | Performed by: FAMILY MEDICINE

## 2024-07-19 PROCEDURE — 1158F ADVNC CARE PLAN TLK DOCD: CPT | Performed by: FAMILY MEDICINE

## 2024-07-19 PROCEDURE — 3074F SYST BP LT 130 MM HG: CPT | Performed by: FAMILY MEDICINE

## 2024-07-19 PROCEDURE — 1159F MED LIST DOCD IN RCRD: CPT | Performed by: FAMILY MEDICINE

## 2024-07-19 RX ORDER — CLOTRIMAZOLE AND BETAMETHASONE DIPROPIONATE 10; .64 MG/G; MG/G
1 CREAM TOPICAL 2 TIMES DAILY
Qty: 45 G | Refills: 3 | Status: SHIPPED | OUTPATIENT
Start: 2024-07-19 | End: 2024-07-19 | Stop reason: SDUPTHER

## 2024-07-19 RX ORDER — CLOTRIMAZOLE AND BETAMETHASONE DIPROPIONATE 10; .64 MG/G; MG/G
1 CREAM TOPICAL 2 TIMES DAILY
Qty: 45 G | Refills: 3 | Status: SHIPPED | OUTPATIENT
Start: 2024-07-19 | End: 2024-11-16

## 2024-07-19 ASSESSMENT — ENCOUNTER SYMPTOMS
COLOR CHANGE: 0
ARTHRALGIAS: 0
ADENOPATHY: 0
DIZZINESS: 0
FLANK PAIN: 0
ABDOMINAL PAIN: 0
SPEECH DIFFICULTY: 0
CONSTITUTIONAL NEGATIVE: 1
SHORTNESS OF BREATH: 0
SINUS PAIN: 0
STRIDOR: 0
FEVER: 0
POLYPHAGIA: 0
DECREASED CONCENTRATION: 0
ACTIVITY CHANGE: 0
HEADACHES: 0
AGITATION: 0
CONSTIPATION: 0
SEIZURES: 0
CONFUSION: 0
DYSURIA: 0
RECTAL PAIN: 0
EYE PAIN: 0
NECK STIFFNESS: 0
DIARRHEA: 0
APPETITE CHANGE: 0
RHINORRHEA: 0
FATIGUE: 0
CHEST TIGHTNESS: 0
MYALGIAS: 0
SINUS PRESSURE: 0
TROUBLE SWALLOWING: 0
SORE THROAT: 0
POLYDIPSIA: 0
ABDOMINAL DISTENTION: 0
BLOOD IN STOOL: 0
HEMATURIA: 0
PALPITATIONS: 0
SLEEP DISTURBANCE: 0
NERVOUS/ANXIOUS: 0
PHOTOPHOBIA: 0
DYSPHORIC MOOD: 0
BACK PAIN: 1
COUGH: 0

## 2024-07-19 NOTE — PROGRESS NOTES
Subjective   Patient ID: Dora Guillermo is a 79 y.o. female who presents for Hypertension.    HPI   Patient is having reduced strength x months. She gets bad back aches and loss of strength with standing a few minutes. Pulse-ox was running low and weight is increasing. She is concerned about the lack of energy.  Patient has been having low Pulse ox in the 80s recently and trouble getting a deep breath. Today, the oxygen level is good.     She is currently with only upper palate of teeth and waiting for lower teeth to be placed. She is only able to eat soft foods.     She gets bilateral arm pain and wrist seem swollen. She is concerned with the use of Lipitor.    Patient would like to review the 5/7/24 labs.   Order Cologuard screening today      Review of Systems   Constitutional: Negative.  Negative for activity change, appetite change, fatigue and fever.   HENT:  Negative for congestion, dental problem, ear discharge, ear pain, mouth sores, rhinorrhea, sinus pressure, sinus pain, sore throat, tinnitus and trouble swallowing.    Eyes:  Negative for photophobia, pain and visual disturbance.   Respiratory:  Negative for cough, chest tightness, shortness of breath and stridor.    Cardiovascular:  Negative for chest pain and palpitations.   Gastrointestinal:  Negative for abdominal distention, abdominal pain, blood in stool, constipation, diarrhea and rectal pain.   Endocrine: Negative for cold intolerance, heat intolerance, polydipsia, polyphagia and polyuria.   Genitourinary:  Negative for dysuria, flank pain, hematuria and urgency.   Musculoskeletal:  Positive for back pain. Negative for arthralgias, gait problem, myalgias and neck stiffness.   Skin:  Negative for color change and rash.   Allergic/Immunologic: Negative for environmental allergies and food allergies.   Neurological:  Negative for dizziness, seizures, syncope, speech difficulty and headaches.   Hematological:  Negative for adenopathy.  "  Psychiatric/Behavioral:  Negative for agitation, confusion, decreased concentration, dysphoric mood and sleep disturbance. The patient is not nervous/anxious.      Objective   /74 (BP Location: Right arm, Patient Position: Sitting, BP Cuff Size: Adult)   Pulse 58   Resp 16   Ht 1.676 m (5' 6\")   Wt 112 kg (247 lb 9.6 oz)   SpO2 97%   BMI 39.96 kg/m²     Physical Exam  Constitutional:       Appearance: Normal appearance.   HENT:      Head: Normocephalic and atraumatic.      Right Ear: Tympanic membrane, ear canal and external ear normal.      Left Ear: Tympanic membrane, ear canal and external ear normal.      Nose: Nose normal.      Mouth/Throat:      Mouth: Mucous membranes are moist.      Pharynx: Oropharynx is clear.   Eyes:      Extraocular Movements: Extraocular movements intact.      Conjunctiva/sclera: Conjunctivae normal.      Pupils: Pupils are equal, round, and reactive to light.   Cardiovascular:      Rate and Rhythm: Normal rate and regular rhythm.      Pulses: Normal pulses.      Heart sounds: Normal heart sounds.   Pulmonary:      Effort: Pulmonary effort is normal.      Breath sounds: Normal breath sounds.   Abdominal:      General: Abdomen is flat. Bowel sounds are normal.      Palpations: Abdomen is soft.   Musculoskeletal:         General: Normal range of motion.      Cervical back: Normal range of motion and neck supple.   Skin:     General: Skin is warm and dry.   Neurological:      General: No focal deficit present.      Mental Status: She is alert and oriented to person, place, and time.   Psychiatric:         Mood and Affect: Mood normal.         Behavior: Behavior normal.         Thought Content: Thought content normal.         Judgment: Judgment normal.       Assessment/Plan   Problem List Items Addressed This Visit             ICD-10-CM    CKD stage G3a/A1, GFR 45-59 and albumin creatinine ratio <30 mg/g (Multi) N18.31    Relevant Orders    Follow Up In [facility] - " PCP - Established    Hyperlipidemia E78.5    Relevant Orders    Lipid Panel    Follow Up In [facility] - PCP - Established    Hypertension I10    Relevant Orders    CBC and Auto Differential    Comprehensive Metabolic Panel    Follow Up In [facility] - PCP - Established    Primary osteoarthritis of right knee M17.11    Relevant Orders    Follow Up In [facility] - PCP - Established    History of total right knee replacement Z96.651    Relevant Orders    Follow Up In [facility] - PCP - Established    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    Relevant Orders    Follow Up In [facility] - PCP - Established     Other Visit Diagnoses         Codes    Tinea corporis    -  Primary B35.4    Relevant Medications    clotrimazole-betamethasone (Lotrisone) cream    Encounter for annual wellness exam in Medicare patient     Z00.00    Healthcare maintenance     Z00.00    Relevant Orders    Follow Up In [facility] - PCP - Established    Vitamin D deficiency     E55.9    Relevant Orders    Vitamin D 25-Hydroxy,Total (for eval of Vitamin D levels)    Follow Up In [facility] - PCP - Established    Allergic rhinitis, unspecified seasonality, unspecified trigger     J30.9    Relevant Orders    Follow Up In [facility] - PCP - Established    Encounter for screening mammogram for malignant neoplasm of breast     Z12.31    Relevant Orders    Follow Up In [facility] - PCP - Established    UTI symptoms     R39.9    Relevant Orders    Follow Up In [facility] - PCP - Established    Encounter for colorectal cancer screening using Cologuard test     Z12.11, Z12.12    Relevant Orders    Cologuard® colon cancer screening    Follow Up In [facility] - PCP - Established          continue all current medications and therapy for chronic medical conditions     ALL bw due in 09/2024    Left Shoulder has improved      Do thyroid screen  once per year    UA NEXT VISIT     Consider low back xr next.    PT & Ice for back.      Dandy Pryoribe Attestation  By signing my name below, I, AIXA Avalos   , Nico   attest that this documentation has been prepared under the direction and in the presence of Bassam Zafar MD.     Provider Attestation - Scribe documentation    All medical record entries made by the Scribe were at my direction and personally dictated by me. I have reviewed the chart and agree that the record accurately reflects my personal performance of the history, physical exam, discussion and plan.

## 2024-09-16 ENCOUNTER — LAB (OUTPATIENT)
Dept: LAB | Facility: LAB | Age: 79
End: 2024-09-16
Payer: COMMERCIAL

## 2024-09-16 DIAGNOSIS — I10 PRIMARY HYPERTENSION: ICD-10-CM

## 2024-09-16 DIAGNOSIS — E55.9 VITAMIN D DEFICIENCY: ICD-10-CM

## 2024-09-16 DIAGNOSIS — E78.2 MIXED HYPERLIPIDEMIA: ICD-10-CM

## 2024-09-16 DIAGNOSIS — I10 HYPERTENSION, UNSPECIFIED TYPE: ICD-10-CM

## 2024-09-16 LAB
25(OH)D3 SERPL-MCNC: 59 NG/ML (ref 30–100)
ALBUMIN SERPL BCP-MCNC: 4.4 G/DL (ref 3.4–5)
ALP SERPL-CCNC: 59 U/L (ref 33–136)
ALT SERPL W P-5'-P-CCNC: 19 U/L (ref 7–45)
ANION GAP SERPL CALC-SCNC: 11 MMOL/L (ref 10–20)
AST SERPL W P-5'-P-CCNC: 19 U/L (ref 9–39)
BASOPHILS # BLD AUTO: 0.05 X10*3/UL (ref 0–0.1)
BASOPHILS NFR BLD AUTO: 1.1 %
BILIRUB SERPL-MCNC: 0.6 MG/DL (ref 0–1.2)
BUN SERPL-MCNC: 27 MG/DL (ref 6–23)
CALCIUM SERPL-MCNC: 9.4 MG/DL (ref 8.6–10.3)
CHLORIDE SERPL-SCNC: 104 MMOL/L (ref 98–107)
CHOLEST SERPL-MCNC: 145 MG/DL (ref 0–199)
CHOLESTEROL/HDL RATIO: 2.4
CO2 SERPL-SCNC: 29 MMOL/L (ref 21–32)
CREAT SERPL-MCNC: 1.02 MG/DL (ref 0.5–1.05)
EGFRCR SERPLBLD CKD-EPI 2021: 56 ML/MIN/1.73M*2
EOSINOPHIL # BLD AUTO: 0.41 X10*3/UL (ref 0–0.4)
EOSINOPHIL NFR BLD AUTO: 9.2 %
ERYTHROCYTE [DISTWIDTH] IN BLOOD BY AUTOMATED COUNT: 14.2 % (ref 11.5–14.5)
GLUCOSE SERPL-MCNC: 94 MG/DL (ref 74–99)
HCT VFR BLD AUTO: 41.4 % (ref 36–46)
HDLC SERPL-MCNC: 61.6 MG/DL
HGB BLD-MCNC: 13.1 G/DL (ref 12–16)
IMM GRANULOCYTES # BLD AUTO: 0.02 X10*3/UL (ref 0–0.5)
IMM GRANULOCYTES NFR BLD AUTO: 0.4 % (ref 0–0.9)
LDLC SERPL CALC-MCNC: 62 MG/DL
LYMPHOCYTES # BLD AUTO: 1.1 X10*3/UL (ref 0.8–3)
LYMPHOCYTES NFR BLD AUTO: 24.7 %
MCH RBC QN AUTO: 28.9 PG (ref 26–34)
MCHC RBC AUTO-ENTMCNC: 31.6 G/DL (ref 32–36)
MCV RBC AUTO: 91 FL (ref 80–100)
MONOCYTES # BLD AUTO: 0.61 X10*3/UL (ref 0.05–0.8)
MONOCYTES NFR BLD AUTO: 13.7 %
NEUTROPHILS # BLD AUTO: 2.27 X10*3/UL (ref 1.6–5.5)
NEUTROPHILS NFR BLD AUTO: 50.9 %
NON HDL CHOLESTEROL: 83 MG/DL (ref 0–149)
NRBC BLD-RTO: 0 /100 WBCS (ref 0–0)
PLATELET # BLD AUTO: 236 X10*3/UL (ref 150–450)
POTASSIUM SERPL-SCNC: 4.7 MMOL/L (ref 3.5–5.3)
PROT SERPL-MCNC: 6.8 G/DL (ref 6.4–8.2)
RBC # BLD AUTO: 4.54 X10*6/UL (ref 4–5.2)
SODIUM SERPL-SCNC: 139 MMOL/L (ref 136–145)
TRIGL SERPL-MCNC: 105 MG/DL (ref 0–149)
VLDL: 21 MG/DL (ref 0–40)
WBC # BLD AUTO: 4.5 X10*3/UL (ref 4.4–11.3)

## 2024-09-16 PROCEDURE — 85025 COMPLETE CBC W/AUTO DIFF WBC: CPT

## 2024-09-16 PROCEDURE — 36415 COLL VENOUS BLD VENIPUNCTURE: CPT

## 2024-09-16 PROCEDURE — 82306 VITAMIN D 25 HYDROXY: CPT

## 2024-09-16 PROCEDURE — 80053 COMPREHEN METABOLIC PANEL: CPT

## 2024-09-16 PROCEDURE — 80061 LIPID PANEL: CPT

## 2024-09-17 ENCOUNTER — APPOINTMENT (OUTPATIENT)
Dept: RADIOLOGY | Facility: HOSPITAL | Age: 79
End: 2024-09-17
Payer: MEDICARE

## 2024-09-17 ENCOUNTER — HOSPITAL ENCOUNTER (EMERGENCY)
Facility: HOSPITAL | Age: 79
Discharge: HOME | End: 2024-09-17
Attending: EMERGENCY MEDICINE
Payer: MEDICARE

## 2024-09-17 VITALS
DIASTOLIC BLOOD PRESSURE: 75 MMHG | HEART RATE: 67 BPM | TEMPERATURE: 98 F | SYSTOLIC BLOOD PRESSURE: 139 MMHG | RESPIRATION RATE: 17 BRPM | BODY MASS INDEX: 39.7 KG/M2 | HEIGHT: 66 IN | OXYGEN SATURATION: 98 % | WEIGHT: 247 LBS
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
## 2024-09-17 DIAGNOSIS — M54.50 ACUTE MIDLINE LOW BACK PAIN WITHOUT SCIATICA: ICD-10-CM

## 2024-09-17 DIAGNOSIS — M54.2 NECK PAIN: ICD-10-CM

## 2024-09-17 PROCEDURE — 2500000001 HC RX 250 WO HCPCS SELF ADMINISTERED DRUGS (ALT 637 FOR MEDICARE OP)

## 2024-09-17 PROCEDURE — 73590 X-RAY EXAM OF LOWER LEG: CPT | Mod: RT

## 2024-09-17 PROCEDURE — 99283 EMERGENCY DEPT VISIT LOW MDM: CPT

## 2024-09-17 PROCEDURE — 73590 X-RAY EXAM OF LOWER LEG: CPT | Mod: RIGHT SIDE | Performed by: RADIOLOGY

## 2024-09-17 RX ORDER — IBUPROFEN 600 MG/1
600 TABLET ORAL ONCE
Status: COMPLETED | OUTPATIENT
Start: 2024-09-17 | End: 2024-09-17

## 2024-09-17 RX ORDER — ACETAMINOPHEN 325 MG/1
650 TABLET ORAL ONCE
Status: COMPLETED | OUTPATIENT
Start: 2024-09-17 | End: 2024-09-17

## 2024-09-17 RX ORDER — LISINOPRIL 40 MG/1
40 TABLET ORAL DAILY
Qty: 90 TABLET | Refills: 1 | Status: SHIPPED | OUTPATIENT
Start: 2024-09-17

## 2024-09-17 ASSESSMENT — PAIN SCALES - GENERAL
PAINLEVEL_OUTOF10: 5 - MODERATE PAIN
PAINLEVEL_OUTOF10: 5 - MODERATE PAIN

## 2024-09-17 ASSESSMENT — PAIN DESCRIPTION - ORIENTATION: ORIENTATION_2: RIGHT

## 2024-09-17 ASSESSMENT — COLUMBIA-SUICIDE SEVERITY RATING SCALE - C-SSRS
2. HAVE YOU ACTUALLY HAD ANY THOUGHTS OF KILLING YOURSELF?: NO
1. IN THE PAST MONTH, HAVE YOU WISHED YOU WERE DEAD OR WISHED YOU COULD GO TO SLEEP AND NOT WAKE UP?: NO
6. HAVE YOU EVER DONE ANYTHING, STARTED TO DO ANYTHING, OR PREPARED TO DO ANYTHING TO END YOUR LIFE?: NO

## 2024-09-17 ASSESSMENT — LIFESTYLE VARIABLES
EVER HAD A DRINK FIRST THING IN THE MORNING TO STEADY YOUR NERVES TO GET RID OF A HANGOVER: NO
HAVE YOU EVER FELT YOU SHOULD CUT DOWN ON YOUR DRINKING: NO
HAVE PEOPLE ANNOYED YOU BY CRITICIZING YOUR DRINKING: NO
EVER FELT BAD OR GUILTY ABOUT YOUR DRINKING: NO
TOTAL SCORE: 0

## 2024-09-17 ASSESSMENT — PAIN DESCRIPTION - LOCATION
LOCATION_2: KNEE
LOCATION: BACK

## 2024-09-17 ASSESSMENT — PAIN - FUNCTIONAL ASSESSMENT: PAIN_FUNCTIONAL_ASSESSMENT: 0-10

## 2024-09-17 ASSESSMENT — PAIN DESCRIPTION - PAIN TYPE: TYPE: ACUTE PAIN
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
## 2024-09-18 NOTE — DISCHARGE INSTRUCTIONS
You may continue to take Tylenol and ibuprofen as needed for your pain.  You may also ice or apply heat packs, whichever makes you feel better.  Please follow-up with your primary care doctor in 2 to 3 days.  Return to the emergency department if you have worsening pain, numbness or tingling on one side of your body, falls, or you have any questions or concerns.

## 2024-09-19 ENCOUNTER — APPOINTMENT (OUTPATIENT)
Dept: PRIMARY CARE | Facility: CLINIC | Age: 79
End: 2024-09-19
Payer: COMMERCIAL

## 2024-09-19 ENCOUNTER — TELEPHONE (OUTPATIENT)
Dept: PRIMARY CARE | Facility: CLINIC | Age: 79
End: 2024-09-19

## 2024-09-19 VITALS
RESPIRATION RATE: 16 BRPM | HEART RATE: 65 BPM | OXYGEN SATURATION: 95 % | WEIGHT: 248 LBS | BODY MASS INDEX: 39.86 KG/M2 | TEMPERATURE: 97.9 F | HEIGHT: 66 IN | DIASTOLIC BLOOD PRESSURE: 72 MMHG | SYSTOLIC BLOOD PRESSURE: 112 MMHG

## 2024-09-19 DIAGNOSIS — E78.2 MIXED HYPERLIPIDEMIA: ICD-10-CM

## 2024-09-19 DIAGNOSIS — E55.9 VITAMIN D DEFICIENCY: ICD-10-CM

## 2024-09-19 DIAGNOSIS — Z12.31 ENCOUNTER FOR SCREENING MAMMOGRAM FOR MALIGNANT NEOPLASM OF BREAST: ICD-10-CM

## 2024-09-19 DIAGNOSIS — Z23 NEED FOR INFLUENZA VACCINATION: ICD-10-CM

## 2024-09-19 DIAGNOSIS — N32.89 BLADDER SPASM: ICD-10-CM

## 2024-09-19 DIAGNOSIS — Z00.00 HEALTHCARE MAINTENANCE: ICD-10-CM

## 2024-09-19 DIAGNOSIS — R31.9 URINARY TRACT INFECTION WITH HEMATURIA, SITE UNSPECIFIED: ICD-10-CM

## 2024-09-19 DIAGNOSIS — Z12.11 ENCOUNTER FOR COLORECTAL CANCER SCREENING USING COLOGUARD TEST: ICD-10-CM

## 2024-09-19 DIAGNOSIS — R30.0 BURNING WITH URINATION: ICD-10-CM

## 2024-09-19 DIAGNOSIS — J30.9 ALLERGIC RHINITIS, UNSPECIFIED SEASONALITY, UNSPECIFIED TRIGGER: ICD-10-CM

## 2024-09-19 DIAGNOSIS — R39.9 UTI SYMPTOMS: ICD-10-CM

## 2024-09-19 DIAGNOSIS — M47.26 OSTEOARTHRITIS OF SPINE WITH RADICULOPATHY, LUMBAR REGION: ICD-10-CM

## 2024-09-19 DIAGNOSIS — I10 PRIMARY HYPERTENSION: ICD-10-CM

## 2024-09-19 DIAGNOSIS — N18.31 CKD STAGE G3A/A1, GFR 45-59 AND ALBUMIN CREATININE RATIO <30 MG/G (MULTI): ICD-10-CM

## 2024-09-19 DIAGNOSIS — Z96.651 HISTORY OF TOTAL RIGHT KNEE REPLACEMENT: ICD-10-CM

## 2024-09-19 DIAGNOSIS — N39.0 URINARY TRACT INFECTION WITH HEMATURIA, SITE UNSPECIFIED: ICD-10-CM

## 2024-09-19 DIAGNOSIS — Z12.12 ENCOUNTER FOR COLORECTAL CANCER SCREENING USING COLOGUARD TEST: ICD-10-CM

## 2024-09-19 DIAGNOSIS — M17.11 PRIMARY OSTEOARTHRITIS OF RIGHT KNEE: ICD-10-CM

## 2024-09-19 LAB
POC APPEARANCE, URINE: CLEAR
POC BILIRUBIN, URINE: NEGATIVE
POC BLOOD, URINE: ABNORMAL
POC COLOR, URINE: YELLOW
POC GLUCOSE, URINE: NEGATIVE MG/DL
POC KETONES, URINE: NEGATIVE MG/DL
POC LEUKOCYTES, URINE: ABNORMAL
POC NITRITE,URINE: NEGATIVE
POC PH, URINE: 6.5 PH
POC PROTEIN, URINE: ABNORMAL MG/DL
POC SPECIFIC GRAVITY, URINE: 1.02
POC UROBILINOGEN, URINE: 0.2 EU/DL

## 2024-09-19 PROCEDURE — 90662 IIV NO PRSV INCREASED AG IM: CPT | Performed by: FAMILY MEDICINE

## 2024-09-19 PROCEDURE — 81003 URINALYSIS AUTO W/O SCOPE: CPT | Performed by: FAMILY MEDICINE

## 2024-09-19 PROCEDURE — 1158F ADVNC CARE PLAN TLK DOCD: CPT | Performed by: FAMILY MEDICINE

## 2024-09-19 PROCEDURE — G0008 ADMIN INFLUENZA VIRUS VAC: HCPCS | Performed by: FAMILY MEDICINE

## 2024-09-19 PROCEDURE — 3074F SYST BP LT 130 MM HG: CPT | Performed by: FAMILY MEDICINE

## 2024-09-19 PROCEDURE — 3078F DIAST BP <80 MM HG: CPT | Performed by: FAMILY MEDICINE

## 2024-09-19 PROCEDURE — 1157F ADVNC CARE PLAN IN RCRD: CPT | Performed by: FAMILY MEDICINE

## 2024-09-19 PROCEDURE — 1123F ACP DISCUSS/DSCN MKR DOCD: CPT | Performed by: FAMILY MEDICINE

## 2024-09-19 PROCEDURE — 1159F MED LIST DOCD IN RCRD: CPT | Performed by: FAMILY MEDICINE

## 2024-09-19 PROCEDURE — 87086 URINE CULTURE/COLONY COUNT: CPT

## 2024-09-19 PROCEDURE — 99214 OFFICE O/P EST MOD 30 MIN: CPT | Performed by: FAMILY MEDICINE

## 2024-09-19 PROCEDURE — 87186 SC STD MICRODIL/AGAR DIL: CPT

## 2024-09-19 RX ORDER — DAPAGLIFLOZIN 5 MG/1
5 TABLET, FILM COATED ORAL DAILY
Qty: 30 TABLET | Refills: 5 | Status: SHIPPED | OUTPATIENT
Start: 2024-09-19 | End: 2025-09-19

## 2024-09-19 RX ORDER — MIRABEGRON 25 MG/1
25 TABLET, FILM COATED, EXTENDED RELEASE ORAL DAILY
Qty: 30 TABLET | Refills: 2 | Status: SHIPPED | OUTPATIENT
Start: 2024-09-19

## 2024-09-19 ASSESSMENT — ENCOUNTER SYMPTOMS
NECK STIFFNESS: 1
BACK PAIN: 1
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
## 2024-09-21 LAB — BACTERIA UR CULT: ABNORMAL

## 2024-09-26 ENCOUNTER — TELEMEDICINE (OUTPATIENT)
Dept: PHARMACY | Facility: HOSPITAL | Age: 79
End: 2024-09-26
Payer: COMMERCIAL

## 2024-09-26 DIAGNOSIS — N18.31 CKD STAGE G3A/A1, GFR 45-59 AND ALBUMIN CREATININE RATIO <30 MG/G (MULTI): ICD-10-CM

## 2024-09-26 DIAGNOSIS — N32.89 BLADDER SPASM: ICD-10-CM

## 2024-09-26 NOTE — PROGRESS NOTES
Clinical Pharmacy Appointment    Patient ID: Dora Guillermo is a 79 y.o. female who presents for Medication Cost.    Pt is here for First appointment.     Referring Provider: Bassam Zafar MD  PCP: Bassam Zafar MD   Last visit with PCP: 2024   Next visit with PCP: 2024      Subjective     HPI  CHRONIC KIDNEY DISEASE ASSESSMENT  Does patient see nephrology? No    Current medications include:  Farxiga 5mg daily    Adverse Effects: N/a    Stage: 3a (eGFR 45-59)  ACE/ARB: Yes, Lisinopril 40mg daily    Medication Review  Current medications and doses were reviewed and are appropriate per current kidney function.  The following medications increase risk of MARCY and should be closely monitored:  ACEi    Hypertension History:  HTN diagnosis: yes  Current Regimen  Lisinopril 40mg daily  Amlodipine 5mg daily  HTN at goal? yes    Hyperlipidemia History:  Diagnosis? yes  Current Regimen  Atorvastatin 40mg daily  At goal? yes  Current LDL: 62mg/dL  Current Tmg/dL    Diabetes History:  Diagnosis? no    BLADDER SPASM ASSESSMENT  Current medications include:  Myrbetriq 25mg daily    Adverse Effects: N/a    Medication Reconciliation:  Discontinued: Fish Oil (patient states discontinued)    Drug Interactions  No relevant drug interactions were noted.    Medication System Management  Patient's preferred pharmacy: [facility]  Adherence/Organization: No issues reported  Affordability/Accessibility: Will assess for  Patient Assistance     Patient Assistance Screening (VAF)  Patient verbally reports monthly or yearly income which is less than 400% federal poverty level  Application for program has been submitted for the following medications:   Myrbetriq 25mg daily  Farxiga 5mg daily  Patient aware this process may take up to 2 weeks once income documents have been sent to the team.  If approved, medication must be filled through [facility] pharmacy and may be picked up or mailed to patient.   If approved,  "medication will be billed through insurance, and patient assistance team will pay the copay. This will result in a $0 copay for the patient.  Counseled patient on mechanism of action, side effects, contraindications, and what to do if the patient misses a dose. All patients questions were answered.       Objective   Allergies   Allergen Reactions    Bactrim [Sulfamethoxazole-Trimethoprim] GI Upset    Cat Dander Unknown    Clarithromycin Unknown    Montelukast Unknown    Morphine Other     Nausea and vomiting     Social History     Social History Narrative    Not on file      Medication Review  Current Outpatient Medications   Medication Instructions    amLODIPine (NORVASC) 5 mg, oral, Daily    amoxicillin (Amoxil) 500 mg capsule Take 4 capsules 1 hour before dental appointment    atorvastatin (LIPITOR) 40 mg, oral, Daily    calcium carbonate 600 mg calcium (1,500 mg) tablet 1 tablet, oral, Daily    cholecalciferol (Vitamin D3) 50 MCG (2000 UT) tablet Take 1 tablet (2,000 Units) by mouth once daily.    clotrimazole-betamethasone (Lotrisone) cream 1 Application, Topical, 2 times daily    dapagliflozin propanediol (FARXIGA) 5 mg, oral, Daily    fluticasone (Flonase) 50 mcg/actuation nasal spray 2 sprays, Each Nostril, Daily, Shake gently. Before first use, prime pump. After use, clean tip and replace cap.    L. acidophilus/Bifid. animalis 15.5 billion cell capsule 1 capsule, oral, Daily    lisinopril 40 mg, oral, Daily    mirabegron (MYRBETRIQ) 25 mg, oral, Daily    multivit-iron-minerals-folic acid (Sentry Senior) 0.4 mg-300 mcg- 250 mcg tab 1 tablet, oral, Daily    naproxen sodium 220 mg capsule 2 capsules, oral, Daily    triamcinolone (Kenalog) 0.1 % cream Topical, 2 times daily      Vitals  BP Readings from Last 2 Encounters:   09/19/24 112/72   09/17/24 139/75     BMI Readings from Last 1 Encounters:   09/19/24 40.03 kg/m²      Labs  A1C  No results found for: \"HGBA1C\"  BMP  Lab Results   Component Value Date    " "CALCIUM 9.4 09/16/2024     09/16/2024    K 4.7 09/16/2024    CO2 29 09/16/2024     09/16/2024    BUN 27 (H) 09/16/2024    CREATININE 1.02 09/16/2024    EGFR 56 (L) 09/16/2024     LFTs  Lab Results   Component Value Date    ALT 19 09/16/2024    AST 19 09/16/2024    ALKPHOS 59 09/16/2024    BILITOT 0.6 09/16/2024     FLP  Lab Results   Component Value Date    TRIG 105 09/16/2024    CHOL 145 09/16/2024    LDLF 122 (H) 08/21/2023    LDLCALC 62 09/16/2024    HDL 61.6 09/16/2024     Urine Microalbumin  No results found for: \"MICROALBCREA\"  Weight Management  Wt Readings from Last 3 Encounters:   09/19/24 112 kg (248 lb)   09/17/24 112 kg (247 lb)   07/19/24 112 kg (247 lb 9.6 oz)      There is no height or weight on file to calculate BMI.     Assessment/Plan   Problem List Items Addressed This Visit       CKD stage G3a/A1, GFR 45-59 and albumin creatinine ratio <30 mg/g (Multi)     ASSESSMENT OF CHRONIC KIDNEY DISEASE  Patient's CKD is stable  Rationale for plan: Patient is managed by PCP. Continue current medication regimen.    Medication Changes:  CONTINUE  Farxiga 5mg daily. Will send medication to [facility] for mail order.    Monitoring and Education Discussed:  Reviewed CKD lab results and benefits of good hydration  Discussed avoidance of NSAIDs, and use of Tylenol for headaches/pain  Recommended need for good control of blood pressure and blood glucose  Advised to report any changes in fluid retention, weight or decreased urinary output    Dapagliflozin (Farxiga) Education:  Counseled patient on Dapagliflozin (Farxiga) MOA, expectations, side effects, duration of therapy, administration, and monitoring parameters.  Reviewed the benefits of SGLT-2i therapy, such as glycemic control and kidney and CV protection.  Advised patient to practice proper  hygiene to reduce risk of UTIs or yeast infections.  Advised patient to maintain adequate fluid intake to remain hydrated while on SGLT2i " therapy.  Answered all patient questions and concerns.         Relevant Medications    dapagliflozin propanediol (Farxiga) 5 mg    Bladder spasm     Patient currently controlled on Myrbetriq 25mg daily. Will continue current medication and send to [facility] for mail order.         Relevant Medications    mirabegron (Myrbetriq) 25 mg tablet extended release 24 hr 24 hr tablet       Clinical Pharmacist follow-up: once there has been a determination on  Patient Assistance, Telehealth visit    Continue all meds under the continuation of care with the referring provider and clinical pharmacy team.    Thank you,  Rina Lao, PharmD  Clinical Pharmacist  [phone]    Verbal consent to manage patient's drug therapy was obtained from the patient. They were informed they may decline to participate or withdraw from participation in pharmacy services at any time.

## 2024-09-30 PROBLEM — N32.89 BLADDER SPASM: Status: ACTIVE | Noted: 2024-09-30

## 2024-09-30 RX ORDER — MIRABEGRON 25 MG/1
25 TABLET, FILM COATED, EXTENDED RELEASE ORAL DAILY
Qty: 90 TABLET | Refills: 3 | Status: SHIPPED | OUTPATIENT
Start: 2024-09-30

## 2024-09-30 RX ORDER — DAPAGLIFLOZIN 5 MG/1
5 TABLET, FILM COATED ORAL DAILY
Qty: 90 TABLET | Refills: 3 | Status: SHIPPED | OUTPATIENT
Start: 2024-09-30 | End: 2025-09-30

## 2024-10-01 NOTE — ASSESSMENT & PLAN NOTE
Patient currently controlled on Myrbetriq 25mg daily. Will continue current medication and send to [facility] for mail order.

## 2024-10-01 NOTE — ASSESSMENT & PLAN NOTE
ASSESSMENT OF CHRONIC KIDNEY DISEASE  Patient's CKD is stable  Rationale for plan: Patient is managed by PCP. Continue current medication regimen.    Medication Changes:  CONTINUE  Farxiga 5mg daily. Will send medication to [facility] for mail order.    Monitoring and Education Discussed:  Reviewed CKD lab results and benefits of good hydration  Discussed avoidance of NSAIDs, and use of Tylenol for headaches/pain  Recommended need for good control of blood pressure and blood glucose  Advised to report any changes in fluid retention, weight or decreased urinary output    Dapagliflozin (Farxiga) Education:  Counseled patient on Dapagliflozin (Farxiga) MOA, expectations, side effects, duration of therapy, administration, and monitoring parameters.  Reviewed the benefits of SGLT-2i therapy, such as glycemic control and kidney and CV protection.  Advised patient to practice proper  hygiene to reduce risk of UTIs or yeast infections.  Advised patient to maintain adequate fluid intake to remain hydrated while on SGLT2i therapy.  Answered all patient questions and concerns.

## 2024-10-02 PROCEDURE — RXMED WILLOW AMBULATORY MEDICATION CHARGE

## 2024-10-03 ENCOUNTER — TELEPHONE (OUTPATIENT)
Dept: PHARMACY | Facility: HOSPITAL | Age: 79
End: 2024-10-03
Payer: COMMERCIAL

## 2024-10-03 NOTE — TELEPHONE ENCOUNTER
Patient Assistance Program Application Status     Dora Guillermo is a 79 y.o. female who was referred to the Clinical Pharmacy Team by Bassam Zafar MD     We are pleased to inform you that your application for assistance has been approved.    This approval is valid through  10/2/2025  as long as the following criteria continue to be satisfied:     Your medication (Farxiga and Myrbetriq) remains covered under your current insurance plan.   Your prescriber does not discontinue therapy.   You do not seek reimbursement from any other private or government-funded programs for the  medication.    Under this program, the pharmacy will first bill your insurance plan for your specified medication. The [employer] Assistance Fund will then offset your copay balance, so that your out-of pocket expense for your medication will be $0.00.     Medication will be filled through [facility], and mailed to the patient. Contacted on the status of the approval. Provided the [facility] phone number, and made aware that they will be hearing from someone at [facility] to set up delivery for the prescriptions.     Please reach out to the Clinical Pharmacy Team if there are any further questions.     Follow up with Clinical Pharmacy Team as needed by the patient or PCP.    Continue all meds under the continuation of care with the referring provider and clinical pharmacy team.    Verbal consent to manage patient's drug therapy was obtained from patient. They were informed they may decline to participate or withdraw from participation in pharmacy services at any time.

## 2024-10-04 ENCOUNTER — PHARMACY VISIT (OUTPATIENT)
Dept: PHARMACY | Facility: CLINIC | Age: 79
End: 2024-10-04
Payer: MEDICARE

## 2024-10-08 ENCOUNTER — APPOINTMENT (OUTPATIENT)
Dept: PHARMACY | Facility: HOSPITAL | Age: 79
End: 2024-10-08
Payer: COMMERCIAL

## 2024-11-16 LAB — NONINV COLON CA DNA+OCC BLD SCRN STL QL: POSITIVE

## 2024-11-21 ENCOUNTER — APPOINTMENT (OUTPATIENT)
Dept: LAB | Facility: LAB | Age: 79
End: 2024-11-21
Payer: COMMERCIAL

## 2024-11-21 ENCOUNTER — APPOINTMENT (OUTPATIENT)
Dept: PRIMARY CARE | Facility: CLINIC | Age: 79
End: 2024-11-21
Payer: COMMERCIAL

## 2024-11-21 VITALS
BODY MASS INDEX: 40.05 KG/M2 | HEART RATE: 68 BPM | TEMPERATURE: 97.5 F | DIASTOLIC BLOOD PRESSURE: 76 MMHG | OXYGEN SATURATION: 97 % | HEIGHT: 66 IN | SYSTOLIC BLOOD PRESSURE: 128 MMHG | WEIGHT: 249.2 LBS | RESPIRATION RATE: 16 BRPM

## 2024-11-21 DIAGNOSIS — Z12.31 ENCOUNTER FOR SCREENING MAMMOGRAM FOR MALIGNANT NEOPLASM OF BREAST: ICD-10-CM

## 2024-11-21 DIAGNOSIS — N18.31 CKD STAGE G3A/A1, GFR 45-59 AND ALBUMIN CREATININE RATIO <30 MG/G (MULTI): ICD-10-CM

## 2024-11-21 DIAGNOSIS — R30.0 BURNING WITH URINATION: ICD-10-CM

## 2024-11-21 DIAGNOSIS — E55.9 VITAMIN D DEFICIENCY: ICD-10-CM

## 2024-11-21 DIAGNOSIS — M47.26 OSTEOARTHRITIS OF SPINE WITH RADICULOPATHY, LUMBAR REGION: ICD-10-CM

## 2024-11-21 DIAGNOSIS — M17.11 PRIMARY OSTEOARTHRITIS OF RIGHT KNEE: ICD-10-CM

## 2024-11-21 DIAGNOSIS — Z23 NEED FOR INFLUENZA VACCINATION: ICD-10-CM

## 2024-11-21 DIAGNOSIS — R19.5 POSITIVE COLORECTAL CANCER SCREENING USING COLOGUARD TEST: ICD-10-CM

## 2024-11-21 DIAGNOSIS — Z00.00 HEALTHCARE MAINTENANCE: ICD-10-CM

## 2024-11-21 DIAGNOSIS — Z96.651 HISTORY OF TOTAL RIGHT KNEE REPLACEMENT: ICD-10-CM

## 2024-11-21 DIAGNOSIS — R53.83 FATIGUE, UNSPECIFIED TYPE: ICD-10-CM

## 2024-11-21 DIAGNOSIS — I10 PRIMARY HYPERTENSION: ICD-10-CM

## 2024-11-21 DIAGNOSIS — Z12.11 ENCOUNTER FOR COLORECTAL CANCER SCREENING USING COLOGUARD TEST: ICD-10-CM

## 2024-11-21 DIAGNOSIS — E78.2 MIXED HYPERLIPIDEMIA: ICD-10-CM

## 2024-11-21 DIAGNOSIS — J30.9 ALLERGIC RHINITIS, UNSPECIFIED SEASONALITY, UNSPECIFIED TRIGGER: ICD-10-CM

## 2024-11-21 DIAGNOSIS — Z12.11 COLON CANCER SCREENING: ICD-10-CM

## 2024-11-21 DIAGNOSIS — Z12.12 ENCOUNTER FOR COLORECTAL CANCER SCREENING USING COLOGUARD TEST: ICD-10-CM

## 2024-11-21 DIAGNOSIS — R39.9 UTI SYMPTOMS: ICD-10-CM

## 2024-11-21 PROCEDURE — 3078F DIAST BP <80 MM HG: CPT | Performed by: FAMILY MEDICINE

## 2024-11-21 PROCEDURE — 1157F ADVNC CARE PLAN IN RCRD: CPT | Performed by: FAMILY MEDICINE

## 2024-11-21 PROCEDURE — 1158F ADVNC CARE PLAN TLK DOCD: CPT | Performed by: FAMILY MEDICINE

## 2024-11-21 PROCEDURE — 1036F TOBACCO NON-USER: CPT | Performed by: FAMILY MEDICINE

## 2024-11-21 PROCEDURE — 99214 OFFICE O/P EST MOD 30 MIN: CPT | Performed by: FAMILY MEDICINE

## 2024-11-21 PROCEDURE — 3074F SYST BP LT 130 MM HG: CPT | Performed by: FAMILY MEDICINE

## 2024-11-21 PROCEDURE — 1160F RVW MEDS BY RX/DR IN RCRD: CPT | Performed by: FAMILY MEDICINE

## 2024-11-21 PROCEDURE — 1159F MED LIST DOCD IN RCRD: CPT | Performed by: FAMILY MEDICINE

## 2024-11-21 PROCEDURE — 1123F ACP DISCUSS/DSCN MKR DOCD: CPT | Performed by: FAMILY MEDICINE

## 2024-11-21 PROCEDURE — G2211 COMPLEX E/M VISIT ADD ON: HCPCS | Performed by: FAMILY MEDICINE

## 2024-11-21 RX ORDER — NAPROXEN 500 MG/1
500 TABLET ORAL 2 TIMES DAILY PRN
Qty: 60 TABLET | Refills: 1 | Status: SHIPPED | OUTPATIENT
Start: 2024-11-21 | End: 2025-02-19

## 2024-11-21 RX ORDER — PREDNISONE 10 MG/1
TABLET ORAL
Qty: 30 TABLET | Refills: 0 | Status: SHIPPED | OUTPATIENT
Start: 2024-11-21

## 2024-11-21 ASSESSMENT — ENCOUNTER SYMPTOMS
DEPRESSION: 1
RHINORRHEA: 0
HEMATURIA: 0
BACK PAIN: 1
COUGH: 0
MYALGIAS: 0
AGITATION: 0
ABDOMINAL DISTENTION: 0
ACTIVITY CHANGE: 0
POLYDIPSIA: 0
NERVOUS/ANXIOUS: 0
ARTHRALGIAS: 0
SINUS PRESSURE: 0
COLOR CHANGE: 0
NECK STIFFNESS: 0
DIARRHEA: 0
CONFUSION: 0
TROUBLE SWALLOWING: 0
ADENOPATHY: 0
FEVER: 0
PALPITATIONS: 0
DYSURIA: 0
PHOTOPHOBIA: 0
RECTAL PAIN: 0
BLOOD IN STOOL: 0
SLEEP DISTURBANCE: 0
SEIZURES: 0
SPEECH DIFFICULTY: 0
CHEST TIGHTNESS: 0
OCCASIONAL FEELINGS OF UNSTEADINESS: 1
CONSTIPATION: 0
DIZZINESS: 0
STRIDOR: 0
SINUS PAIN: 0
HEADACHES: 0
SHORTNESS OF BREATH: 0
ABDOMINAL PAIN: 0
FLANK PAIN: 0
SORE THROAT: 0
EYE PAIN: 0
DECREASED CONCENTRATION: 0
DYSPHORIC MOOD: 0
APPETITE CHANGE: 0
POLYPHAGIA: 0
FATIGUE: 1
LOSS OF SENSATION IN FEET: 0

## 2024-11-21 ASSESSMENT — PATIENT HEALTH QUESTIONNAIRE - PHQ9
1. LITTLE INTEREST OR PLEASURE IN DOING THINGS: NOT AT ALL
SUM OF ALL RESPONSES TO PHQ9 QUESTIONS 1 AND 2: 0
2. FEELING DOWN, DEPRESSED OR HOPELESS: NOT AT ALL

## 2024-11-21 NOTE — H&P (VIEW-ONLY)
Subjective   Patient ID: Dora Guillermo is a 79 y.o. female who presents for Fatigue and Back Pain.    HPI     Patient is at the office today with concerns of body weakness. Patient reports that she is having constant lower back pain that radiates up to the neck specially after walking long distance.    Patient reports that she would like to discuss dosage and frequency of medication  Naproxen.    Patient reports that she is being emotional lately due to recent change of  health status.    Cologuard completed on 11/7/24    Review of Systems   Constitutional:  Positive for fatigue. Negative for activity change, appetite change and fever.   HENT:  Negative for congestion, dental problem, ear discharge, ear pain, mouth sores, rhinorrhea, sinus pressure, sinus pain, sore throat, tinnitus and trouble swallowing.    Eyes:  Negative for photophobia, pain and visual disturbance.   Respiratory:  Negative for cough, chest tightness, shortness of breath and stridor.    Cardiovascular:  Negative for chest pain and palpitations.   Gastrointestinal:  Negative for abdominal distention, abdominal pain, blood in stool, constipation, diarrhea and rectal pain.   Endocrine: Negative for cold intolerance, heat intolerance, polydipsia, polyphagia and polyuria.   Genitourinary:  Negative for dysuria, flank pain, hematuria and urgency.   Musculoskeletal:  Positive for back pain. Negative for arthralgias, gait problem, myalgias and neck stiffness.   Skin:  Negative for color change and rash.   Allergic/Immunologic: Negative for environmental allergies and food allergies.   Neurological:  Negative for dizziness, seizures, syncope, speech difficulty and headaches.   Hematological:  Negative for adenopathy.   Psychiatric/Behavioral:  Negative for agitation, confusion, decreased concentration, dysphoric mood and sleep disturbance. The patient is not nervous/anxious.      Objective   /76 (BP Location: Right arm, Patient Position:  "Sitting, BP Cuff Size: Adult)   Pulse 68   Temp 36.4 °C (97.5 °F) (Temporal)   Resp 16   Ht 1.676 m (5' 6\")   Wt 113 kg (249 lb 3.2 oz)   SpO2 97%   BMI 40.22 kg/m²     Physical Exam  Constitutional:       Appearance: Normal appearance.   HENT:      Head: Normocephalic and atraumatic.      Right Ear: Tympanic membrane, ear canal and external ear normal.      Left Ear: Tympanic membrane, ear canal and external ear normal.      Nose: Nose normal.      Mouth/Throat:      Mouth: Mucous membranes are moist.      Pharynx: Oropharynx is clear.   Eyes:      Extraocular Movements: Extraocular movements intact.      Conjunctiva/sclera: Conjunctivae normal.      Pupils: Pupils are equal, round, and reactive to light.   Cardiovascular:      Rate and Rhythm: Normal rate and regular rhythm.      Pulses: Normal pulses.      Heart sounds: Normal heart sounds.   Pulmonary:      Effort: Pulmonary effort is normal.      Breath sounds: Normal breath sounds.   Abdominal:      General: Abdomen is flat. Bowel sounds are normal.      Palpations: Abdomen is soft.   Musculoskeletal:         General: Normal range of motion.      Cervical back: Normal range of motion and neck supple.   Skin:     General: Skin is warm and dry.   Neurological:      General: No focal deficit present.      Mental Status: She is alert and oriented to person, place, and time.   Psychiatric:         Mood and Affect: Mood normal.         Behavior: Behavior normal.         Thought Content: Thought content normal.         Judgment: Judgment normal.       Assessment/Plan   Problem List Items Addressed This Visit             ICD-10-CM    CKD stage G3a/A1, GFR 45-59 and albumin creatinine ratio <30 mg/g (Multi) N18.31    Relevant Orders    Follow Up In [facility] - PCP - Established    Hyperlipidemia E78.5    Relevant Orders    Follow Up In [facility] - PCP - Established    Hypertension I10    Relevant Orders    CBC and Auto Differential    " Comprehensive Metabolic Panel    Lipid Panel    Follow Up In [facility] - PCP - Established    Primary osteoarthritis of right knee M17.11    Relevant Orders    Follow Up In [facility] - PCP - Established    History of total right knee replacement Z96.651    Relevant Orders    Follow Up In [facility] - PCP - Established    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    Relevant Orders    Follow Up In [facility] - PCP - Established     Other Visit Diagnoses         Codes    Healthcare maintenance     Z00.00    Relevant Orders    Follow Up In [facility] - PCP - Established    Vitamin D deficiency     E55.9    Relevant Orders    Vitamin D 25-Hydroxy,Total (for eval of Vitamin D levels)    Follow Up In [facility] - PCP - Established    Allergic rhinitis, unspecified seasonality, unspecified trigger     J30.9    Relevant Orders    Follow Up In [facility] - PCP - Established    Encounter for screening mammogram for malignant neoplasm of breast     Z12.31    Relevant Orders    Follow Up In [facility] - PCP - Established    UTI symptoms     R39.9    Relevant Orders    Follow Up In [facility] - PCP - Established    Encounter for colorectal cancer screening using Cologuard test     Z12.11, Z12.12    Relevant Orders    Follow Up In [facility] - PCP - Established    Burning with urination     R30.0    Relevant Orders    Follow Up In [facility] - PCP - Established    Need for influenza vaccination     Z23    Relevant Orders    Follow Up In [facility] - PCP - Established    Positive colorectal cancer screening using Cologuard test     R19.5    Relevant Orders    Colonoscopy Screening; Average Risk Patient    Follow Up In [facility] - PCP - Established    Colon cancer screening     Z12.11    Relevant Orders    Colonoscopy Screening; Average Risk Patient    Follow Up In [facility] -  PCP - Established    Fatigue, unspecified type     R53.83    Relevant Orders    Thyroid Stimulating Hormone    Follow Up In [facility] - PCP - Established          Begin taking a tapering dose of Prednisone.     RX for rollator given to patient.     continue all current medications and therapy for chronic medical conditions     Left Shoulder has improved     Consider low back xr next.     thyroid screen once per year     Colonoscopy ordered.     UA next.     [facility] & myrbetriq     Pharmacy    Fu 1 month     BW prior to next    Scribe Attestation  By signing my name below, I, AIXA Avalos   , Nico   attest that this documentation has been prepared under the direction and in the presence of Bassam Zafar MD.     Provider Attestation - Scribe documentation    All medical record entries made by the Scribe were at my direction and personally dictated by me. I have reviewed the chart and agree that the record accurately reflects my personal performance of the history, physical exam, discussion and plan.

## 2024-11-21 NOTE — PROGRESS NOTES
Subjective   Patient ID: Dora Guillermo is a 79 y.o. female who presents for Fatigue and Back Pain.    HPI     Patient is at the office today with concerns of body weakness. Patient reports that she is having constant lower back pain that radiates up to the neck specially after walking long distance.    Patient reports that she would like to discuss dosage and frequency of medication  Naproxen.    Patient reports that she is being emotional lately due to recent change of  health status.    Cologuard completed on 11/7/24    Review of Systems   Constitutional:  Positive for fatigue. Negative for activity change, appetite change and fever.   HENT:  Negative for congestion, dental problem, ear discharge, ear pain, mouth sores, rhinorrhea, sinus pressure, sinus pain, sore throat, tinnitus and trouble swallowing.    Eyes:  Negative for photophobia, pain and visual disturbance.   Respiratory:  Negative for cough, chest tightness, shortness of breath and stridor.    Cardiovascular:  Negative for chest pain and palpitations.   Gastrointestinal:  Negative for abdominal distention, abdominal pain, blood in stool, constipation, diarrhea and rectal pain.   Endocrine: Negative for cold intolerance, heat intolerance, polydipsia, polyphagia and polyuria.   Genitourinary:  Negative for dysuria, flank pain, hematuria and urgency.   Musculoskeletal:  Positive for back pain. Negative for arthralgias, gait problem, myalgias and neck stiffness.   Skin:  Negative for color change and rash.   Allergic/Immunologic: Negative for environmental allergies and food allergies.   Neurological:  Negative for dizziness, seizures, syncope, speech difficulty and headaches.   Hematological:  Negative for adenopathy.   Psychiatric/Behavioral:  Negative for agitation, confusion, decreased concentration, dysphoric mood and sleep disturbance. The patient is not nervous/anxious.      Objective   /76 (BP Location: Right arm, Patient Position:  "Sitting, BP Cuff Size: Adult)   Pulse 68   Temp 36.4 °C (97.5 °F) (Temporal)   Resp 16   Ht 1.676 m (5' 6\")   Wt 113 kg (249 lb 3.2 oz)   SpO2 97%   BMI 40.22 kg/m²     Physical Exam  Constitutional:       Appearance: Normal appearance.   HENT:      Head: Normocephalic and atraumatic.      Right Ear: Tympanic membrane, ear canal and external ear normal.      Left Ear: Tympanic membrane, ear canal and external ear normal.      Nose: Nose normal.      Mouth/Throat:      Mouth: Mucous membranes are moist.      Pharynx: Oropharynx is clear.   Eyes:      Extraocular Movements: Extraocular movements intact.      Conjunctiva/sclera: Conjunctivae normal.      Pupils: Pupils are equal, round, and reactive to light.   Cardiovascular:      Rate and Rhythm: Normal rate and regular rhythm.      Pulses: Normal pulses.      Heart sounds: Normal heart sounds.   Pulmonary:      Effort: Pulmonary effort is normal.      Breath sounds: Normal breath sounds.   Abdominal:      General: Abdomen is flat. Bowel sounds are normal.      Palpations: Abdomen is soft.   Musculoskeletal:         General: Normal range of motion.      Cervical back: Normal range of motion and neck supple.   Skin:     General: Skin is warm and dry.   Neurological:      General: No focal deficit present.      Mental Status: She is alert and oriented to person, place, and time.   Psychiatric:         Mood and Affect: Mood normal.         Behavior: Behavior normal.         Thought Content: Thought content normal.         Judgment: Judgment normal.       Assessment/Plan   Problem List Items Addressed This Visit             ICD-10-CM    CKD stage G3a/A1, GFR 45-59 and albumin creatinine ratio <30 mg/g (Multi) N18.31    Relevant Orders    Follow Up In [facility] - PCP - Established    Hyperlipidemia E78.5    Relevant Orders    Follow Up [facility] - PCP - Established    Hypertension I10    Relevant Orders    CBC and Auto Differential    " Comprehensive Metabolic Panel    Lipid Panel    Follow Up [facility] - PCP - Established    Primary osteoarthritis of right knee M17.11    Relevant Orders    Follow Up [facility] - PCP - Established    History of total right knee replacement Z96.651    Relevant Orders    Follow Up [facility] - PCP - Established    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    Relevant Orders    Follow Up [facility] - PCP - Established     Other Visit Diagnoses         Codes    Healthcare maintenance     Z00.00    Relevant Orders    Follow Up [facility] - PCP - Established    Vitamin D deficiency     E55.9    Relevant Orders    Vitamin D 25-Hydroxy,Total (for eval of Vitamin D levels)    Follow Up [facility] - PCP - Established    Allergic rhinitis, unspecified seasonality, unspecified trigger     J30.9    Relevant Orders    Follow Up [facility] - PCP - Established    Encounter for screening mammogram for malignant neoplasm of breast     Z12.31    Relevant Orders    Follow Up [facility] - PCP - Established    UTI symptoms     R39.9    Relevant Orders    Follow Up [facility] - PCP - Established    Encounter for colorectal cancer screening using Cologuard test     Z12.11, Z12.12    Relevant Orders    Follow Up [facility] - PCP - Established    Burning with urination     R30.0    Relevant Orders    Follow Up [facility] - PCP - Established    Need for influenza vaccination     Z23    Relevant Orders    Follow Up [facility] - PCP - Established    Positive colorectal cancer screening using Cologuard test     R19.5    Relevant Orders    Colonoscopy Screening; Average Risk Patient    Follow Up [facility] - PCP - Established    Colon cancer screening     Z12.11    Relevant Orders    Colonoscopy Screening; Average Risk Patient    Follow Up [facility] -  PCP - Established    Fatigue, unspecified type     R53.83    Relevant Orders    Thyroid Stimulating Hormone    Follow Up [facility] - PCP - Established          Begin taking a tapering dose of Prednisone.     RX for rollator given to patient.     continue all current medications and therapy for chronic medical conditions     Left Shoulder has improved     Consider low back xr next.     thyroid screen once per year     Colonoscopy ordered.     UA next.     [facility] & myrbetriq     Pharmacy    Fu 1 month     BW prior to next    Scribe Attestation  By signing my name below, I, AIXA Avalos   , Nico   attest that this documentation has been prepared under the direction and in the presence of Bassam Zafar MD.     Provider Attestation - Scribe documentation    All medical record entries made by the Scribe were at my direction and personally dictated by me. I have reviewed the chart and agree that the record accurately reflects my personal performance of the history, physical exam, discussion and plan.

## 2024-11-27 DIAGNOSIS — Z12.11 COLON CANCER SCREENING: ICD-10-CM

## 2024-11-29 RX ORDER — POLYETHYLENE GLYCOL 3350, SODIUM SULFATE ANHYDROUS, SODIUM BICARBONATE, SODIUM CHLORIDE, POTASSIUM CHLORIDE 236; 22.74; 6.74; 5.86; 2.97 G/4L; G/4L; G/4L; G/4L; G/4L
4000 POWDER, FOR SOLUTION ORAL ONCE
Qty: 4000 ML | Refills: 0 | Status: SHIPPED | OUTPATIENT
Start: 2024-11-29 | End: 2024-11-29

## 2024-12-06 ENCOUNTER — TELEPHONE (OUTPATIENT)
Dept: PRIMARY CARE | Facility: CLINIC | Age: 79
End: 2024-12-06

## 2024-12-06 NOTE — TELEPHONE ENCOUNTER
[facility] calling in regards to Pre-op forms that were faxed over 11/27 for CB to sign. This clearance needs to be faxed back to the facility no later than Tuesday, please fax forms when completed.

## 2024-12-09 ENCOUNTER — ANESTHESIA EVENT (OUTPATIENT)
Dept: GASTROENTEROLOGY | Facility: EXTERNAL LOCATION | Age: 79
End: 2024-12-09

## 2024-12-10 ENCOUNTER — LAB (OUTPATIENT)
Dept: LAB | Facility: LAB | Age: 79
End: 2024-12-10
Payer: COMMERCIAL

## 2024-12-10 DIAGNOSIS — I10 PRIMARY HYPERTENSION: ICD-10-CM

## 2024-12-10 DIAGNOSIS — R53.83 FATIGUE, UNSPECIFIED TYPE: ICD-10-CM

## 2024-12-10 DIAGNOSIS — E55.9 VITAMIN D DEFICIENCY: ICD-10-CM

## 2024-12-10 DIAGNOSIS — E78.2 MIXED HYPERLIPIDEMIA: ICD-10-CM

## 2024-12-10 LAB
25(OH)D3 SERPL-MCNC: 42 NG/ML (ref 30–100)
ALBUMIN SERPL BCP-MCNC: 4.5 G/DL (ref 3.4–5)
ALP SERPL-CCNC: 57 U/L (ref 33–136)
ALT SERPL W P-5'-P-CCNC: 26 U/L (ref 7–45)
ANION GAP SERPL CALC-SCNC: 13 MMOL/L (ref 10–20)
AST SERPL W P-5'-P-CCNC: 22 U/L (ref 9–39)
BASOPHILS # BLD AUTO: 0.06 X10*3/UL (ref 0–0.1)
BASOPHILS NFR BLD AUTO: 1.1 %
BILIRUB SERPL-MCNC: 0.9 MG/DL (ref 0–1.2)
BUN SERPL-MCNC: 22 MG/DL (ref 6–23)
CALCIUM SERPL-MCNC: 9.4 MG/DL (ref 8.6–10.3)
CHLORIDE SERPL-SCNC: 103 MMOL/L (ref 98–107)
CHOLEST SERPL-MCNC: 170 MG/DL (ref 0–199)
CHOLESTEROL/HDL RATIO: 2.5
CO2 SERPL-SCNC: 29 MMOL/L (ref 21–32)
CREAT SERPL-MCNC: 1.16 MG/DL (ref 0.5–1.05)
EGFRCR SERPLBLD CKD-EPI 2021: 48 ML/MIN/1.73M*2
EOSINOPHIL # BLD AUTO: 0.37 X10*3/UL (ref 0–0.4)
EOSINOPHIL NFR BLD AUTO: 6.9 %
ERYTHROCYTE [DISTWIDTH] IN BLOOD BY AUTOMATED COUNT: 14.7 % (ref 11.5–14.5)
GLUCOSE SERPL-MCNC: 91 MG/DL (ref 74–99)
HCT VFR BLD AUTO: 43.1 % (ref 36–46)
HDLC SERPL-MCNC: 68.9 MG/DL
HGB BLD-MCNC: 13.9 G/DL (ref 12–16)
IMM GRANULOCYTES # BLD AUTO: 0.02 X10*3/UL (ref 0–0.5)
IMM GRANULOCYTES NFR BLD AUTO: 0.4 % (ref 0–0.9)
LDLC SERPL CALC-MCNC: 72 MG/DL
LYMPHOCYTES # BLD AUTO: 1.22 X10*3/UL (ref 0.8–3)
LYMPHOCYTES NFR BLD AUTO: 22.7 %
MCH RBC QN AUTO: 28.8 PG (ref 26–34)
MCHC RBC AUTO-ENTMCNC: 32.3 G/DL (ref 32–36)
MCV RBC AUTO: 89 FL (ref 80–100)
MONOCYTES # BLD AUTO: 0.68 X10*3/UL (ref 0.05–0.8)
MONOCYTES NFR BLD AUTO: 12.7 %
NEUTROPHILS # BLD AUTO: 3.02 X10*3/UL (ref 1.6–5.5)
NEUTROPHILS NFR BLD AUTO: 56.2 %
NON HDL CHOLESTEROL: 101 MG/DL (ref 0–149)
NRBC BLD-RTO: 0 /100 WBCS (ref 0–0)
PLATELET # BLD AUTO: 215 X10*3/UL (ref 150–450)
POTASSIUM SERPL-SCNC: 4.6 MMOL/L (ref 3.5–5.3)
PROT SERPL-MCNC: 6.9 G/DL (ref 6.4–8.2)
RBC # BLD AUTO: 4.83 X10*6/UL (ref 4–5.2)
SODIUM SERPL-SCNC: 140 MMOL/L (ref 136–145)
TRIGL SERPL-MCNC: 145 MG/DL (ref 0–149)
TSH SERPL-ACNC: 2.25 MIU/L (ref 0.44–3.98)
VLDL: 29 MG/DL (ref 0–40)
WBC # BLD AUTO: 5.4 X10*3/UL (ref 4.4–11.3)

## 2024-12-10 PROCEDURE — 36415 COLL VENOUS BLD VENIPUNCTURE: CPT

## 2024-12-10 PROCEDURE — 80053 COMPREHEN METABOLIC PANEL: CPT

## 2024-12-10 PROCEDURE — 82306 VITAMIN D 25 HYDROXY: CPT

## 2024-12-10 PROCEDURE — 84443 ASSAY THYROID STIM HORMONE: CPT

## 2024-12-10 PROCEDURE — 85025 COMPLETE CBC W/AUTO DIFF WBC: CPT

## 2024-12-10 PROCEDURE — 80061 LIPID PANEL: CPT

## 2024-12-11 DIAGNOSIS — M17.11 PRIMARY OSTEOARTHRITIS OF RIGHT KNEE: ICD-10-CM

## 2024-12-11 DIAGNOSIS — Z79.2 PROPHYLACTIC ANTIBIOTIC: ICD-10-CM

## 2024-12-11 DIAGNOSIS — Z96.651 TOTAL KNEE REPLACEMENT STATUS, RIGHT: ICD-10-CM

## 2024-12-11 RX ORDER — ATORVASTATIN CALCIUM 40 MG/1
40 TABLET, FILM COATED ORAL DAILY
Qty: 90 TABLET | Refills: 1 | Status: SHIPPED | OUTPATIENT
Start: 2024-12-11

## 2024-12-12 DIAGNOSIS — I10 PRIMARY HYPERTENSION: ICD-10-CM

## 2024-12-12 RX ORDER — AMOXICILLIN 500 MG/1
CAPSULE ORAL
Qty: 4 CAPSULE | Refills: 0 | Status: SHIPPED | OUTPATIENT
Start: 2024-12-12

## 2024-12-13 RX ORDER — AMLODIPINE BESYLATE 5 MG/1
5 TABLET ORAL DAILY
Qty: 90 TABLET | Refills: 1 | Status: SHIPPED | OUTPATIENT
Start: 2024-12-13

## 2024-12-14 ENCOUNTER — APPOINTMENT (OUTPATIENT)
Dept: GASTROENTEROLOGY | Facility: EXTERNAL LOCATION | Age: 79
End: 2024-12-14
Payer: COMMERCIAL

## 2024-12-14 ENCOUNTER — ANESTHESIA (OUTPATIENT)
Dept: GASTROENTEROLOGY | Facility: EXTERNAL LOCATION | Age: 79
End: 2024-12-14

## 2024-12-14 VITALS
DIASTOLIC BLOOD PRESSURE: 71 MMHG | HEIGHT: 66 IN | HEART RATE: 71 BPM | TEMPERATURE: 97.3 F | WEIGHT: 250 LBS | RESPIRATION RATE: 16 BRPM | BODY MASS INDEX: 40.18 KG/M2 | OXYGEN SATURATION: 98 % | SYSTOLIC BLOOD PRESSURE: 105 MMHG

## 2024-12-14 DIAGNOSIS — R19.5 POSITIVE COLORECTAL CANCER SCREENING USING COLOGUARD TEST: Primary | ICD-10-CM

## 2024-12-14 DIAGNOSIS — Z12.11 COLON CANCER SCREENING: ICD-10-CM

## 2024-12-14 PROCEDURE — G0121 COLON CA SCRN NOT HI RSK IND: HCPCS | Performed by: INTERNAL MEDICINE

## 2024-12-14 RX ORDER — LIDOCAINE HYDROCHLORIDE 20 MG/ML
INJECTION, SOLUTION INFILTRATION; PERINEURAL AS NEEDED
Status: DISCONTINUED | OUTPATIENT
Start: 2024-12-14 | End: 2024-12-14

## 2024-12-14 RX ORDER — SODIUM CHLORIDE 9 MG/ML
INJECTION, SOLUTION INTRAVENOUS CONTINUOUS PRN
Status: DISCONTINUED | OUTPATIENT
Start: 2024-12-14 | End: 2024-12-14

## 2024-12-14 RX ORDER — PROPOFOL 10 MG/ML
INJECTION, EMULSION INTRAVENOUS AS NEEDED
Status: DISCONTINUED | OUTPATIENT
Start: 2024-12-14 | End: 2024-12-14

## 2024-12-14 SDOH — HEALTH STABILITY: MENTAL HEALTH: CURRENT SMOKER: 0

## 2024-12-14 ASSESSMENT — PAIN - FUNCTIONAL ASSESSMENT: PAIN_FUNCTIONAL_ASSESSMENT: 0-10

## 2024-12-14 ASSESSMENT — PAIN SCALES - GENERAL
PAIN_LEVEL: 0
PAINLEVEL_OUTOF10: 0 - NO PAIN

## 2024-12-14 ASSESSMENT — COLUMBIA-SUICIDE SEVERITY RATING SCALE - C-SSRS
1. IN THE PAST MONTH, HAVE YOU WISHED YOU WERE DEAD OR WISHED YOU COULD GO TO SLEEP AND NOT WAKE UP?: NO
6. HAVE YOU EVER DONE ANYTHING, STARTED TO DO ANYTHING, OR PREPARED TO DO ANYTHING TO END YOUR LIFE?: NO
2. HAVE YOU ACTUALLY HAD ANY THOUGHTS OF KILLING YOURSELF?: NO

## 2024-12-14 NOTE — ANESTHESIA POSTPROCEDURE EVALUATION
Patient: Dora Guillermo    Procedure Summary       Date: 12/14/24 Room / Location: [facility]    Anesthesia Start: 0922 Anesthesia Stop: 0948    Procedure: COLONOSCOPY Diagnosis:       Positive colorectal cancer screening using Cologuard test      Colon cancer screening      Positive colorectal cancer screening using Cologuard test    Scheduled Providers: Kenrick Gray MD; Malu Ellis MA; CLEO Grayson Responsible Provider: CLEO Grayson    Anesthesia Type: MAC ASA Status: 3            Anesthesia Type: MAC    Vitals Value Taken Time   BP 98/54 12/14/24 0948   Temp 36.3 °C (97.3 °F) 12/14/24 0948   Pulse 70 12/14/24 0948   Resp 15 12/14/24 0948   SpO2 97 % 12/14/24 0948       Anesthesia Post Evaluation    Patient location during evaluation: bedside  Patient participation: complete - patient participated  Level of consciousness: awake  Pain score: 0  Pain management: adequate  Airway patency: patent  Cardiovascular status: acceptable  Respiratory status: acceptable  Hydration status: acceptable  Postoperative Nausea and Vomiting: none    There were no known notable events for this encounter.

## 2024-12-14 NOTE — DISCHARGE INSTRUCTIONS
Patient Instructions Post Procedure      The anesthetics, sedatives or narcotics which were given to you today will be acting in your body for the next 24 hours, so you might feel a little sleepy or groggy.  This feeling should slowly wear off. Carefully read and follow the instructions.     You received sedation today:  - Do not drive or operate any machinery or power tools of any kind.   - No alcoholic beverages today, not even beer or wine.  - Do not make any important decisions or sign any legal documents.  - No over the counter medications that contain alcohol or that may cause drowsiness.    While it is common to experience mild to moderate abdominal distention, gas, or belching after your procedure, if any of these symptoms occur following discharge from the GI Lab or within one week of having your procedure, call the [facility] [place] to be advised whether a visit to your nearest Urgent Care or Emergency Department is indicated.  Take this paper with you if you go.   - If you develop an allergic reaction to the medications that were given during your procedure such as difficulty breathing, rash, hives, severe nausea, vomiting or lightheadedness.  - If you experience chest pain, shortness of breath, severe abdominal pain, fevers and chills.  -If you develop signs and symptoms of bleeding such as blood in your spit, if your stools turn black, tarry, or bloody  - If you have not urinated within 8 hours following your procedure.  - If your IV site becomes painful, red, inflamed, or looks infected.    If you received a biopsy/polypectomy/sphincterotomy the following instructions apply below:  __ Do not use Aspirin containing products, non-steroidal medications or anti-coagulants for one week following your procedure. (Examples of these types of medications are: Advil, Arthrotec, Aleve, Coumadin, Ecotrin, Heparin, Ibuprofen, Indocin, Motrin, Naprosyn, Nuprin, Plavix, Vioxx, and Voltarin, or their generic  forms.  This list is not all-inclusive.  Check with your physician or pharmacist before resuming medications.)   __ Eat a soft diet today.  Avoid foods that are poorly digested for the next 24 hours.  These foods would include: nuts, beans, lettuce, red meats, and fried foods. Start with liquids and advance your diet as tolerated, gradually work up to eating solids.   __ Do not have a Barium Study or Enema for one week.    Your physician recommends the additional following instructions:    -You have a contact number available for emergencies. The signs and symptoms of potential delayed complications were discussed with you. You may return to normal activities tomorrow.  -Resume your previous diet or other if specified.  -Continue your present medications.   -We are waiting for your pathology results, if applicable.  -The findings and recommendations have been discussed with you and/or family.  - Please see Medication Reconciliation Form for new medication/medications prescribed.     If you experience any problems or have any questions following discharge from the GI Lab, please call: [phone] from 7 am- 4:30 pm.  In the event of an emergency please go to the closest Emergency Department or call  [phone]

## 2024-12-14 NOTE — ANESTHESIA PREPROCEDURE EVALUATION
Patient: Dora Guillermo    Procedure Information       Date/Time: 12/14/24 0930    Scheduled providers: Kenrick Gray MD; Malu Ellis MA; MYLA Grayson-CRNA    Procedure: COLONOSCOPY    Location: [facility]            Relevant Problems   Cardiac   (+) Hyperlipidemia   (+) Hypertension      Liver   (+) Hepatitis-C      Endocrine   (+) Obesity, morbid (Multi)      Musculoskeletal   (+) Chronic low back pain   (+) DJD (degenerative joint disease) of knee   (+) Osteoarthritis of lumbar spine   (+) Primary osteoarthritis of right knee      ID   (+) Hepatitis-C      Skin   (+) Basal cell carcinoma of skin   (+) Eczema       Clinical information reviewed:   Tobacco  Allergies  Meds   Med Hx  Surg Hx   Fam Hx  Soc Hx        NPO Detail:  NPO/Void Status  Date of Last Liquid: 12/14/24  Time of Last Liquid: 0640  Date of Last Solid: 12/12/24         Physical Exam    Airway  Mallampati: II  TM distance: >3 FB  Neck ROM: full     Cardiovascular - normal exam  Rhythm: regular  Rate: normal     Dental - normal exam     Pulmonary - normal exam  Breath sounds clear to auscultation     Abdominal - normal exam  (+) obese  Abdomen: soft         Anesthesia Plan    History of general anesthesia?: yes  History of complications of general anesthesia?: no    ASA 3     MAC     The patient is not a current smoker.    intravenous induction   Anesthetic plan and risks discussed with patient.    Plan discussed with CRNA.

## 2024-12-23 ENCOUNTER — APPOINTMENT (OUTPATIENT)
Dept: PRIMARY CARE | Facility: CLINIC | Age: 79
End: 2024-12-23
Payer: COMMERCIAL

## 2024-12-23 VITALS
HEIGHT: 66 IN | BODY MASS INDEX: 40.18 KG/M2 | SYSTOLIC BLOOD PRESSURE: 122 MMHG | RESPIRATION RATE: 16 BRPM | HEART RATE: 64 BPM | TEMPERATURE: 97 F | WEIGHT: 250 LBS | DIASTOLIC BLOOD PRESSURE: 62 MMHG | OXYGEN SATURATION: 95 %

## 2024-12-23 DIAGNOSIS — Z12.31 ENCOUNTER FOR SCREENING MAMMOGRAM FOR MALIGNANT NEOPLASM OF BREAST: ICD-10-CM

## 2024-12-23 DIAGNOSIS — M47.26 OSTEOARTHRITIS OF SPINE WITH RADICULOPATHY, LUMBAR REGION: ICD-10-CM

## 2024-12-23 DIAGNOSIS — R39.9 UTI SYMPTOMS: ICD-10-CM

## 2024-12-23 DIAGNOSIS — M17.11 PRIMARY OSTEOARTHRITIS OF RIGHT KNEE: ICD-10-CM

## 2024-12-23 DIAGNOSIS — R30.0 BURNING WITH URINATION: ICD-10-CM

## 2024-12-23 DIAGNOSIS — Z00.00 HEALTHCARE MAINTENANCE: ICD-10-CM

## 2024-12-23 DIAGNOSIS — Z23 NEED FOR INFLUENZA VACCINATION: ICD-10-CM

## 2024-12-23 DIAGNOSIS — E55.9 VITAMIN D DEFICIENCY: ICD-10-CM

## 2024-12-23 DIAGNOSIS — N18.31 CKD STAGE G3A/A1, GFR 45-59 AND ALBUMIN CREATININE RATIO <30 MG/G (MULTI): ICD-10-CM

## 2024-12-23 DIAGNOSIS — Z12.12 ENCOUNTER FOR COLORECTAL CANCER SCREENING USING COLOGUARD TEST: ICD-10-CM

## 2024-12-23 DIAGNOSIS — I10 PRIMARY HYPERTENSION: ICD-10-CM

## 2024-12-23 DIAGNOSIS — E78.2 MIXED HYPERLIPIDEMIA: ICD-10-CM

## 2024-12-23 DIAGNOSIS — R53.83 FATIGUE, UNSPECIFIED TYPE: ICD-10-CM

## 2024-12-23 DIAGNOSIS — Z12.11 COLON CANCER SCREENING: ICD-10-CM

## 2024-12-23 DIAGNOSIS — R19.5 POSITIVE COLORECTAL CANCER SCREENING USING COLOGUARD TEST: ICD-10-CM

## 2024-12-23 DIAGNOSIS — Z12.11 ENCOUNTER FOR COLORECTAL CANCER SCREENING USING COLOGUARD TEST: ICD-10-CM

## 2024-12-23 DIAGNOSIS — Z96.651 HISTORY OF TOTAL RIGHT KNEE REPLACEMENT: ICD-10-CM

## 2024-12-23 DIAGNOSIS — J30.9 ALLERGIC RHINITIS, UNSPECIFIED SEASONALITY, UNSPECIFIED TRIGGER: ICD-10-CM

## 2024-12-23 PROCEDURE — G2211 COMPLEX E/M VISIT ADD ON: HCPCS | Performed by: FAMILY MEDICINE

## 2024-12-23 PROCEDURE — 99214 OFFICE O/P EST MOD 30 MIN: CPT | Performed by: FAMILY MEDICINE

## 2024-12-23 ASSESSMENT — ENCOUNTER SYMPTOMS
SINUS PRESSURE: 0
FEVER: 0
ANAL BLEEDING: 0
LIGHT-HEADEDNESS: 0
JOINT SWELLING: 0
COLOR CHANGE: 0
SINUS PAIN: 0
AGITATION: 0
EYE PAIN: 0
WEAKNESS: 0
HEADACHES: 0
VOMITING: 0
COUGH: 0
PALPITATIONS: 0
FACIAL SWELLING: 0
SEIZURES: 0
DYSURIA: 0
DIFFICULTY URINATING: 0
DIAPHORESIS: 0
POLYDIPSIA: 0
WHEEZING: 0
GASTROINTESTINAL NEGATIVE: 1
POLYPHAGIA: 0
MYALGIAS: 0
PHOTOPHOBIA: 0
ADENOPATHY: 0
EYE REDNESS: 0
CARDIOVASCULAR NEGATIVE: 1
ABDOMINAL DISTENTION: 0
DIARRHEA: 0
DYSPHORIC MOOD: 0
SORE THROAT: 0
TREMORS: 0
ABDOMINAL PAIN: 0
FREQUENCY: 0
HYPERTENSION: 1
APNEA: 0
SPEECH DIFFICULTY: 0
STRIDOR: 0
CHEST TIGHTNESS: 0
HEMATURIA: 0
NERVOUS/ANXIOUS: 0
NECK PAIN: 0
CONFUSION: 0
APPETITE CHANGE: 0
NAUSEA: 0
HYPERACTIVE: 0
UNEXPECTED WEIGHT CHANGE: 0
ARTHRALGIAS: 0
CONSTITUTIONAL NEGATIVE: 1
TROUBLE SWALLOWING: 0
HALLUCINATIONS: 0
NUMBNESS: 0
CHOKING: 0
RHINORRHEA: 0
FLANK PAIN: 0
SHORTNESS OF BREATH: 0
FACIAL ASYMMETRY: 0
VOICE CHANGE: 0
BLOOD IN STOOL: 0
FATIGUE: 0
WOUND: 0
CHILLS: 0
ACTIVITY CHANGE: 0
BACK PAIN: 0
EYE DISCHARGE: 0
SLEEP DISTURBANCE: 0
NECK STIFFNESS: 0
CONSTIPATION: 0
RECTAL PAIN: 0
DECREASED CONCENTRATION: 0
DIZZINESS: 0
BRUISES/BLEEDS EASILY: 0
EYE ITCHING: 0

## 2024-12-23 NOTE — PROGRESS NOTES
Subjective   Patient ID: Dora Guillermo is a 79 y.o. female who presents for Hypertension.    Patient presents today for one month follow up and to go over lab work results, She denies having any concerns during today's visit.    Hypertension  Pertinent negatives include no chest pain, headaches, neck pain, palpitations or shortness of breath.        Review of Systems   Constitutional: Negative.  Negative for activity change, appetite change, chills, diaphoresis, fatigue, fever and unexpected weight change.   HENT: Negative.  Negative for congestion, dental problem, ear discharge, facial swelling, hearing loss, mouth sores, nosebleeds, postnasal drip, rhinorrhea, sinus pressure, sinus pain, sneezing, sore throat, tinnitus, trouble swallowing and voice change.    Eyes:  Negative for photophobia, pain, discharge, redness, itching and visual disturbance.   Respiratory:  Negative for apnea, cough, choking, chest tightness, shortness of breath, wheezing and stridor.    Cardiovascular: Negative.  Negative for chest pain, palpitations and leg swelling.   Gastrointestinal: Negative.  Negative for abdominal distention, abdominal pain, anal bleeding, blood in stool, constipation, diarrhea, nausea, rectal pain and vomiting.   Endocrine: Negative for cold intolerance, heat intolerance, polydipsia, polyphagia and polyuria.   Genitourinary:  Negative for decreased urine volume, difficulty urinating, dysuria, enuresis, flank pain, frequency, hematuria and urgency.   Musculoskeletal:  Negative for arthralgias, back pain, gait problem, joint swelling, myalgias, neck pain and neck stiffness.   Skin:  Negative for color change, pallor, rash and wound.   Allergic/Immunologic: Negative for environmental allergies, food allergies and immunocompromised state.   Neurological:  Negative for dizziness, tremors, seizures, syncope, facial asymmetry, speech difficulty, weakness, light-headedness, numbness and headaches.   Hematological:   "Negative for adenopathy. Does not bruise/bleed easily.   Psychiatric/Behavioral:  Negative for agitation, behavioral problems, confusion, decreased concentration, dysphoric mood, hallucinations, self-injury, sleep disturbance and suicidal ideas. The patient is not nervous/anxious and is not hyperactive.    All other systems reviewed and are negative.      Objective   /62 (BP Location: Left arm, Patient Position: Sitting, BP Cuff Size: Large adult)   Pulse 64   Temp 36.1 °C (97 °F) (Temporal)   Resp 16   Ht 1.676 m (5' 6\")   Wt 113 kg (250 lb)   SpO2 95%   BMI 40.35 kg/m²     Physical Exam  Vitals reviewed.   Constitutional:       General: She is not in acute distress.     Appearance: Normal appearance. She is normal weight. She is not ill-appearing or diaphoretic.   HENT:      Head: Normocephalic.      Right Ear: Tympanic membrane and external ear normal.      Left Ear: Tympanic membrane and external ear normal.      Nose: Nose normal. No congestion.      Mouth/Throat:      Pharynx: No posterior oropharyngeal erythema.   Eyes:      General:         Right eye: No discharge.         Left eye: No discharge.      Extraocular Movements: Extraocular movements intact.      Conjunctiva/sclera: Conjunctivae normal.      Pupils: Pupils are equal, round, and reactive to light.   Cardiovascular:      Rate and Rhythm: Normal rate and regular rhythm.      Pulses: Normal pulses.      Heart sounds: Normal heart sounds. No murmur heard.  Pulmonary:      Effort: Pulmonary effort is normal. No respiratory distress.      Breath sounds: Normal breath sounds. No wheezing or rales.   Chest:      Chest wall: No tenderness.   Abdominal:      General: Abdomen is flat. Bowel sounds are normal. There is no distension.      Palpations: There is no mass.      Tenderness: There is no abdominal tenderness. There is no guarding.   Musculoskeletal:         General: No tenderness. Normal range of motion.      Cervical back: Normal range " of motion and neck supple. No tenderness.      Right lower leg: No edema.      Left lower leg: No edema.   Skin:     General: Skin is dry.      Coloration: Skin is not jaundiced.      Findings: No bruising, erythema or rash.   Neurological:      General: No focal deficit present.      Mental Status: She is alert and oriented to person, place, and time. Mental status is at baseline.      Cranial Nerves: No cranial nerve deficit.      Sensory: No sensory deficit.      Coordination: Coordination normal.      Gait: Gait normal.   Psychiatric:         Mood and Affect: Mood normal.         Thought Content: Thought content normal.         Judgment: Judgment normal.         Assessment/Plan   Problem List Items Addressed This Visit             ICD-10-CM    CKD stage G3a/A1, GFR 45-59 and albumin creatinine ratio <30 mg/g (Multi) N18.31    Relevant Orders    Follow Up In [facility] - PCP - Established    Hyperlipidemia E78.5    Relevant Orders    Follow Up [facility] - PCP - Established    Hypertension I10    Relevant Orders    Follow Up [facility] - PCP - Established    Primary osteoarthritis of right knee M17.11    Relevant Orders    Follow Up [facility] - PCP - Established    History of total right knee replacement Z96.651    Relevant Orders    Follow Up [facility] - PCP - Established    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    Relevant Orders    Follow Up In [facility] - PCP - Established     Other Visit Diagnoses         Codes    Healthcare maintenance     Z00.00    Relevant Orders    Follow Up In [facility] - PCP - Established    Vitamin D deficiency     E55.9    Relevant Orders    Follow Up In [facility] - PCP - Established    Allergic rhinitis, unspecified seasonality, unspecified trigger     J30.9    Relevant Orders    Follow Up In [facility] - PCP - Established    Encounter for screening mammogram for  malignant neoplasm of breast     Z12.31    Relevant Orders    Follow Up In [facility] - PCP - Established    UTI symptoms     R39.9    Relevant Orders    Follow Up [facility] - PCP - Established    Encounter for colorectal cancer screening using Cologuard test     Z12.11, Z12.12    Relevant Orders    Follow Up [facility] - PCP - Established    Burning with urination     R30.0    Relevant Orders    Follow Up In [facility] - PCP - Established    Need for influenza vaccination     Z23    Relevant Orders    Follow Up [facility] - PCP - Established    Positive colorectal cancer screening using Cologuard test     R19.5    Relevant Orders    Follow Up [facility] - PCP - Established    Colon cancer screening     Z12.11    Relevant Orders    Follow Up [facility] - PCP - Established    Fatigue, unspecified type     R53.83    Relevant Orders    Follow Up In [facility] - PCP - Established        Scribe Attestation  By signing my name below, I, Nico Ansari MA   attest that this documentation has been prepared under the direction and in the presence of Bassam Zafar MD.    Provider Attestation - Scribe documentation    All medical record entries made by the Scribe were at my direction and personally dictated by me. I have reviewed the chart and agree that the record accurately reflects my personal performance of the history, physical exam, discussion and plan.    Continue current medications and therapy for chronic medical conditions    Begin taking a tapering dose of Prednisone.      RX for rollator given to patient.      Left Shoulder has improved      Consider low back xr next.      thyroid screen once per year      Colonoscopy ordered within normal limits      [facility] & myrbetriq     Pharmacy     Fu 2 months    CMP prior see GFR     All BW Q 4 months

## 2024-12-26 PROCEDURE — RXMED WILLOW AMBULATORY MEDICATION CHARGE

## 2024-12-30 ENCOUNTER — PHARMACY VISIT (OUTPATIENT)
Dept: PHARMACY | Facility: CLINIC | Age: 79
End: 2024-12-30
Payer: MEDICARE

## 2025-01-13 DIAGNOSIS — M17.11 PRIMARY OSTEOARTHRITIS OF RIGHT KNEE: ICD-10-CM

## 2025-01-13 DIAGNOSIS — M47.26 OSTEOARTHRITIS OF SPINE WITH RADICULOPATHY, LUMBAR REGION: ICD-10-CM

## 2025-01-13 RX ORDER — NAPROXEN 500 MG/1
TABLET ORAL
Qty: 60 TABLET | Refills: 0 | Status: SHIPPED | OUTPATIENT
Start: 2025-01-13

## 2025-02-28 ENCOUNTER — APPOINTMENT (OUTPATIENT)
Dept: PRIMARY CARE | Facility: CLINIC | Age: 80
End: 2025-02-28
Payer: COMMERCIAL

## 2025-02-28 VITALS
OXYGEN SATURATION: 94 % | HEIGHT: 66 IN | DIASTOLIC BLOOD PRESSURE: 62 MMHG | RESPIRATION RATE: 16 BRPM | WEIGHT: 246 LBS | BODY MASS INDEX: 39.53 KG/M2 | HEART RATE: 76 BPM | TEMPERATURE: 97.2 F | SYSTOLIC BLOOD PRESSURE: 112 MMHG

## 2025-02-28 DIAGNOSIS — Z96.651 HISTORY OF TOTAL RIGHT KNEE REPLACEMENT: ICD-10-CM

## 2025-02-28 DIAGNOSIS — J30.9 ALLERGIC RHINITIS, UNSPECIFIED SEASONALITY, UNSPECIFIED TRIGGER: ICD-10-CM

## 2025-02-28 DIAGNOSIS — M47.26 OSTEOARTHRITIS OF SPINE WITH RADICULOPATHY, LUMBAR REGION: ICD-10-CM

## 2025-02-28 DIAGNOSIS — Z12.12 ENCOUNTER FOR COLORECTAL CANCER SCREENING USING COLOGUARD TEST: ICD-10-CM

## 2025-02-28 DIAGNOSIS — Z00.00 HEALTHCARE MAINTENANCE: ICD-10-CM

## 2025-02-28 DIAGNOSIS — I10 PRIMARY HYPERTENSION: ICD-10-CM

## 2025-02-28 DIAGNOSIS — Z12.11 ENCOUNTER FOR COLORECTAL CANCER SCREENING USING COLOGUARD TEST: ICD-10-CM

## 2025-02-28 DIAGNOSIS — R19.5 POSITIVE COLORECTAL CANCER SCREENING USING COLOGUARD TEST: ICD-10-CM

## 2025-02-28 DIAGNOSIS — E78.2 MIXED HYPERLIPIDEMIA: ICD-10-CM

## 2025-02-28 DIAGNOSIS — Z12.11 COLON CANCER SCREENING: ICD-10-CM

## 2025-02-28 DIAGNOSIS — M17.11 PRIMARY OSTEOARTHRITIS OF RIGHT KNEE: ICD-10-CM

## 2025-02-28 DIAGNOSIS — N18.31 CKD STAGE G3A/A1, GFR 45-59 AND ALBUMIN CREATININE RATIO <30 MG/G (MULTI): ICD-10-CM

## 2025-02-28 DIAGNOSIS — E55.9 VITAMIN D DEFICIENCY: ICD-10-CM

## 2025-02-28 DIAGNOSIS — R30.0 BURNING WITH URINATION: ICD-10-CM

## 2025-02-28 DIAGNOSIS — R53.83 FATIGUE, UNSPECIFIED TYPE: ICD-10-CM

## 2025-02-28 DIAGNOSIS — R73.9 HYPERGLYCEMIA: Primary | ICD-10-CM

## 2025-02-28 DIAGNOSIS — Z23 NEED FOR INFLUENZA VACCINATION: ICD-10-CM

## 2025-02-28 DIAGNOSIS — R39.9 UTI SYMPTOMS: ICD-10-CM

## 2025-02-28 DIAGNOSIS — Z12.31 ENCOUNTER FOR SCREENING MAMMOGRAM FOR MALIGNANT NEOPLASM OF BREAST: ICD-10-CM

## 2025-02-28 PROCEDURE — 1157F ADVNC CARE PLAN IN RCRD: CPT | Performed by: FAMILY MEDICINE

## 2025-02-28 PROCEDURE — 1159F MED LIST DOCD IN RCRD: CPT | Performed by: FAMILY MEDICINE

## 2025-02-28 PROCEDURE — 99214 OFFICE O/P EST MOD 30 MIN: CPT | Performed by: FAMILY MEDICINE

## 2025-02-28 PROCEDURE — 1158F ADVNC CARE PLAN TLK DOCD: CPT | Performed by: FAMILY MEDICINE

## 2025-02-28 PROCEDURE — 1123F ACP DISCUSS/DSCN MKR DOCD: CPT | Performed by: FAMILY MEDICINE

## 2025-02-28 PROCEDURE — 1036F TOBACCO NON-USER: CPT | Performed by: FAMILY MEDICINE

## 2025-02-28 PROCEDURE — 3074F SYST BP LT 130 MM HG: CPT | Performed by: FAMILY MEDICINE

## 2025-02-28 PROCEDURE — 3078F DIAST BP <80 MM HG: CPT | Performed by: FAMILY MEDICINE

## 2025-02-28 PROCEDURE — 1160F RVW MEDS BY RX/DR IN RCRD: CPT | Performed by: FAMILY MEDICINE

## 2025-02-28 ASSESSMENT — ENCOUNTER SYMPTOMS
OCCASIONAL FEELINGS OF UNSTEADINESS: 0
DEPRESSION: 0
LOSS OF SENSATION IN FEET: 0

## 2025-02-28 ASSESSMENT — PATIENT HEALTH QUESTIONNAIRE - PHQ9
SUM OF ALL RESPONSES TO PHQ9 QUESTIONS 1 AND 2: 0
1. LITTLE INTEREST OR PLEASURE IN DOING THINGS: NOT AT ALL
2. FEELING DOWN, DEPRESSED OR HOPELESS: NOT AT ALL

## 2025-02-28 NOTE — PROGRESS NOTES
Subjective   Patient ID: Dora Guillermo is a 80 y.o. female who presents for Shoulder Pain.    HPI   Patient is at the office today to discuss left shoulder pain. Patient repots that her symptoms are improving and she feels much better.Patient reports she is compliant with her medications prescribed.    Patient states she is using Naproxen at least daily.    BW order from 12/23/2024 still active.    No medications refill today.  Review of Systems  12 Systems have been reviewed as follows.  Constitutional: Fever, weight gain, weight loss, appetite change, night sweats, fatigue, chills.  Eyes : blurry, double vision, vision, loss, tearing, redness, pain, sensitivity to light, glaucoma.  Ears, nose, mouth, and throat: Hearing loss, ringing in the ears, ear pain, nasal congestion, nasal drainage, nosebleeds, mouth, throat, irritation tooth problem.  Cardiovascular :chest pain, pressure, heart racing, palpitations, sweating, leg swelling, high or low blood pressure  Pulmonary: Cough, yellow or green sputum, blood and sputum, shortness of breath, wheezing  Gastrointestinal: Nausea, vomiting, diarrhea, constipation, pain, blood in stool, or vomitus, heartburn, difficulty swallowing  Genitourinary: incontinence, abnormal bleeding, abnormal discharge, urinary frequency, urinary hesitancy, pain, impotence sexual problem, infection, urinary retention  Musculoskeletal: Pain, stiffness, joint, redness or warmth, arthritis, back pain, weakness, muscle wasting, sprain or fracture  Neuro: Weight weakness, dizziness, change in voice, change in taste change in vision, change in hearing, loss, or change of sensation, trouble walking, balance problems coordination problems, shaking, speech problem  Endocrine , cold or heat intolerance, blood sugar problem, weight gain or loss missed periods hot flashes, sweats, change in body hair, change in libido, increased thirst, increased urination  Heme/lymph: Swelling, bleeding, problem anemia,  "bruising, enlarged lymph nodes  Allergic/immunologic: H. plus nasal drip, watery itchy eyes, nasal drainage, immunosuppressed  The above were reviewed and noted negative except as noted in HPI and Problem List.    Objective   /62 (BP Location: Right arm, Patient Position: Sitting, BP Cuff Size: Large adult)   Pulse 76   Temp 36.2 °C (97.2 °F) (Temporal)   Resp 16   Ht 1.676 m (5' 6\")   Wt 112 kg (246 lb)   SpO2 94%   BMI 39.71 kg/m²     Physical Exam  Constitutional: Well developed, well nourished, alert and in no acute distress   Eyes: Normal external exam. Pupils equally round and reactive to light with normal accommodation and extraocular movements intact.  Neck: Supple, no lymphadenopathy or masses.   Cardiovascular: Regular rate and rhythm, normal S1 and S2, no murmurs, gallops, or rubs. Radial pulses normal. No peripheral edema.  Pulmonary: No respiratory distress, lungs clear to auscultation bilaterally. No wheezes, rhonchi, rales.  Abdomen: soft,non tender, non distended, without masses or HSM  Skin: Warm, well perfused, normal skin turgor and color.   Neurologic: Cranial nerves II-XII grossly intact.   Psychiatric: Mood calm and affect normal  Musculoskeletal: Moving all extremities without restriction    Assessment/Plan   Problem List Items Addressed This Visit             ICD-10-CM    CKD stage G3a/A1, GFR 45-59 and albumin creatinine ratio <30 mg/g (Multi) N18.31    Relevant Orders    Follow Up In [facility] - PCP - Established    Hyperlipidemia E78.5    Relevant Orders    Comprehensive Metabolic Panel    Lipid Panel    Follow Up [facility] - PCP - Established    Hypertension I10    Relevant Orders    Follow Up [facility] - PCP - Established    Primary osteoarthritis of right knee M17.11    Relevant Orders    Follow Up [facility] - PCP - Established    History of total right knee replacement Z96.651    Relevant Orders    Follow Up In " [facility] - PCP - Established    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    Relevant Orders    Follow Up [facility] - PCP - Established     Other Visit Diagnoses         Codes    Hyperglycemia    -  Primary R73.9    Relevant Orders    Hemoglobin A1C    Follow Up [facility] - PCP - Established    Healthcare maintenance     Z00.00    Relevant Orders    Follow Up [facility] - PCP - Established    Vitamin D deficiency     E55.9    Relevant Orders    Vitamin D 25-Hydroxy,Total (for eval of Vitamin D levels)    Follow Up [facility] - PCP - Established    Allergic rhinitis, unspecified seasonality, unspecified trigger     J30.9    Relevant Orders    Follow Up [facility] - PCP - Established    Encounter for screening mammogram for malignant neoplasm of breast     Z12.31    Relevant Orders    Follow Up [facility] - PCP - Established    UTI symptoms     R39.9    Relevant Orders    Follow Up [facility] - PCP - Established    Encounter for colorectal cancer screening using Cologuard test     Z12.11, Z12.12    Relevant Orders    Follow Up [facility] - PCP - Established    Burning with urination     R30.0    Relevant Orders    Follow Up [facility] - PCP - Established    Need for influenza vaccination     Z23    Relevant Orders    Follow Up [facility] - PCP - Established    Positive colorectal cancer screening using Cologuard test     R19.5    Relevant Orders    Follow Up [facility] - PCP - Established    Colon cancer screening     Z12.11    Relevant Orders    Follow Up [facility] - PCP - Established    Fatigue, unspecified type     R53.83    Relevant Orders    CBC and Auto Differential    Follow Up [facility] - PCP - Established          Continue current medications and therapy for chronic medical conditions     RX for rollator given to  patient.      Left Shoulder has improved      Consider low back xr next.      thyroid screen once per year      Colonoscopy ordered within normal limits      Farxiga & myrbetriq     Pharmacy     Fu 2 months     BW prior see GFR     All BW Q 4 months

## 2025-03-26 PROCEDURE — RXMED WILLOW AMBULATORY MEDICATION CHARGE

## 2025-03-27 ENCOUNTER — PHARMACY VISIT (OUTPATIENT)
Dept: PHARMACY | Facility: CLINIC | Age: 80
End: 2025-03-27
Payer: MEDICARE

## 2025-04-01 LAB
25(OH)D3+25(OH)D2 SERPL-MCNC: 66 NG/ML (ref 30–100)
ALBUMIN SERPL-MCNC: 4.6 G/DL (ref 3.6–5.1)
ALP SERPL-CCNC: 49 U/L (ref 37–153)
ALT SERPL-CCNC: 19 U/L (ref 6–29)
ANION GAP SERPL CALCULATED.4IONS-SCNC: 6 MMOL/L (CALC) (ref 7–17)
AST SERPL-CCNC: 19 U/L (ref 10–35)
BASOPHILS # BLD AUTO: 52 CELLS/UL (ref 0–200)
BASOPHILS NFR BLD AUTO: 1.1 %
BILIRUB SERPL-MCNC: 0.7 MG/DL (ref 0.2–1.2)
BUN SERPL-MCNC: 26 MG/DL (ref 7–25)
CALCIUM SERPL-MCNC: 9.4 MG/DL (ref 8.6–10.4)
CHLORIDE SERPL-SCNC: 103 MMOL/L (ref 98–110)
CHOLEST SERPL-MCNC: 140 MG/DL
CHOLEST/HDLC SERPL: 2.4 (CALC)
CO2 SERPL-SCNC: 30 MMOL/L (ref 20–32)
CREAT SERPL-MCNC: 1.12 MG/DL (ref 0.6–1)
EGFRCR SERPLBLD CKD-EPI 2021: 50 ML/MIN/1.73M2
EOSINOPHIL # BLD AUTO: 320 CELLS/UL (ref 15–500)
EOSINOPHIL NFR BLD AUTO: 6.8 %
ERYTHROCYTE [DISTWIDTH] IN BLOOD BY AUTOMATED COUNT: 13.6 % (ref 11–15)
EST. AVERAGE GLUCOSE BLD GHB EST-MCNC: 120 MG/DL
EST. AVERAGE GLUCOSE BLD GHB EST-SCNC: 6.6 MMOL/L
GLUCOSE SERPL-MCNC: 90 MG/DL (ref 65–99)
HBA1C MFR BLD: 5.8 % OF TOTAL HGB
HCT VFR BLD AUTO: 42.3 % (ref 35–45)
HDLC SERPL-MCNC: 58 MG/DL
HGB BLD-MCNC: 13.5 G/DL (ref 11.7–15.5)
LDLC SERPL CALC-MCNC: 61 MG/DL (CALC)
LYMPHOCYTES # BLD AUTO: 1241 CELLS/UL (ref 850–3900)
LYMPHOCYTES NFR BLD AUTO: 26.4 %
MCH RBC QN AUTO: 28.1 PG (ref 27–33)
MCHC RBC AUTO-ENTMCNC: 31.9 G/DL (ref 32–36)
MCV RBC AUTO: 88.1 FL (ref 80–100)
MONOCYTES # BLD AUTO: 475 CELLS/UL (ref 200–950)
MONOCYTES NFR BLD AUTO: 10.1 %
NEUTROPHILS # BLD AUTO: 2613 CELLS/UL (ref 1500–7800)
NEUTROPHILS NFR BLD AUTO: 55.6 %
NONHDLC SERPL-MCNC: 82 MG/DL (CALC)
PLATELET # BLD AUTO: 254 THOUSAND/UL (ref 140–400)
PMV BLD REES-ECKER: 10.3 FL (ref 7.5–12.5)
POTASSIUM SERPL-SCNC: 4.8 MMOL/L (ref 3.5–5.3)
PROT SERPL-MCNC: 6.8 G/DL (ref 6.1–8.1)
RBC # BLD AUTO: 4.8 MILLION/UL (ref 3.8–5.1)
SODIUM SERPL-SCNC: 139 MMOL/L (ref 135–146)
TRIGL SERPL-MCNC: 125 MG/DL
WBC # BLD AUTO: 4.7 THOUSAND/UL (ref 3.8–10.8)

## 2025-04-10 ENCOUNTER — OFFICE VISIT (OUTPATIENT)
Dept: PODIATRY | Facility: CLINIC | Age: 80
End: 2025-04-10
Payer: COMMERCIAL

## 2025-04-10 ENCOUNTER — APPOINTMENT (OUTPATIENT)
Dept: PRIMARY CARE | Facility: CLINIC | Age: 80
End: 2025-04-10
Payer: COMMERCIAL

## 2025-04-10 VITALS
TEMPERATURE: 98.1 F | SYSTOLIC BLOOD PRESSURE: 120 MMHG | OXYGEN SATURATION: 96 % | HEART RATE: 62 BPM | DIASTOLIC BLOOD PRESSURE: 66 MMHG | BODY MASS INDEX: 40.38 KG/M2 | WEIGHT: 250.2 LBS

## 2025-04-10 DIAGNOSIS — M47.26 OSTEOARTHRITIS OF SPINE WITH RADICULOPATHY, LUMBAR REGION: ICD-10-CM

## 2025-04-10 DIAGNOSIS — E55.9 VITAMIN D DEFICIENCY: ICD-10-CM

## 2025-04-10 DIAGNOSIS — R53.83 FATIGUE, UNSPECIFIED TYPE: ICD-10-CM

## 2025-04-10 DIAGNOSIS — R39.9 UTI SYMPTOMS: ICD-10-CM

## 2025-04-10 DIAGNOSIS — R19.5 POSITIVE COLORECTAL CANCER SCREENING USING COLOGUARD TEST: ICD-10-CM

## 2025-04-10 DIAGNOSIS — Z00.00 HEALTHCARE MAINTENANCE: ICD-10-CM

## 2025-04-10 DIAGNOSIS — N18.31 CKD STAGE G3A/A1, GFR 45-59 AND ALBUMIN CREATININE RATIO <30 MG/G (MULTI): ICD-10-CM

## 2025-04-10 DIAGNOSIS — Z12.11 ENCOUNTER FOR COLORECTAL CANCER SCREENING USING COLOGUARD TEST: ICD-10-CM

## 2025-04-10 DIAGNOSIS — M79.675 PAIN IN TOES OF BOTH FEET: ICD-10-CM

## 2025-04-10 DIAGNOSIS — R53.81 PHYSICAL DEBILITY: Primary | ICD-10-CM

## 2025-04-10 DIAGNOSIS — M17.11 PRIMARY OSTEOARTHRITIS OF RIGHT KNEE: ICD-10-CM

## 2025-04-10 DIAGNOSIS — B35.1 ONYCHOMYCOSIS: ICD-10-CM

## 2025-04-10 DIAGNOSIS — Z12.11 COLON CANCER SCREENING: ICD-10-CM

## 2025-04-10 DIAGNOSIS — R30.0 BURNING WITH URINATION: ICD-10-CM

## 2025-04-10 DIAGNOSIS — Z12.31 ENCOUNTER FOR SCREENING MAMMOGRAM FOR MALIGNANT NEOPLASM OF BREAST: ICD-10-CM

## 2025-04-10 DIAGNOSIS — M79.674 PAIN IN TOES OF BOTH FEET: ICD-10-CM

## 2025-04-10 DIAGNOSIS — E78.2 MIXED HYPERLIPIDEMIA: ICD-10-CM

## 2025-04-10 DIAGNOSIS — I10 PRIMARY HYPERTENSION: ICD-10-CM

## 2025-04-10 DIAGNOSIS — J30.9 ALLERGIC RHINITIS, UNSPECIFIED SEASONALITY, UNSPECIFIED TRIGGER: ICD-10-CM

## 2025-04-10 DIAGNOSIS — R73.9 HYPERGLYCEMIA: ICD-10-CM

## 2025-04-10 DIAGNOSIS — E66.01 OBESITY, MORBID (MULTI): ICD-10-CM

## 2025-04-10 DIAGNOSIS — Z23 NEED FOR INFLUENZA VACCINATION: ICD-10-CM

## 2025-04-10 DIAGNOSIS — Z12.12 ENCOUNTER FOR COLORECTAL CANCER SCREENING USING COLOGUARD TEST: ICD-10-CM

## 2025-04-10 DIAGNOSIS — Z96.651 HISTORY OF TOTAL RIGHT KNEE REPLACEMENT: ICD-10-CM

## 2025-04-10 PROCEDURE — G2211 COMPLEX E/M VISIT ADD ON: HCPCS | Performed by: FAMILY MEDICINE

## 2025-04-10 PROCEDURE — 1157F ADVNC CARE PLAN IN RCRD: CPT | Performed by: PODIATRIST

## 2025-04-10 PROCEDURE — 1123F ACP DISCUSS/DSCN MKR DOCD: CPT | Performed by: PODIATRIST

## 2025-04-10 PROCEDURE — 99214 OFFICE O/P EST MOD 30 MIN: CPT | Performed by: FAMILY MEDICINE

## 2025-04-10 PROCEDURE — 1036F TOBACCO NON-USER: CPT | Performed by: PODIATRIST

## 2025-04-10 PROCEDURE — 1159F MED LIST DOCD IN RCRD: CPT | Performed by: PODIATRIST

## 2025-04-10 PROCEDURE — 1160F RVW MEDS BY RX/DR IN RCRD: CPT | Performed by: PODIATRIST

## 2025-04-10 PROCEDURE — 99203 OFFICE O/P NEW LOW 30 MIN: CPT | Performed by: PODIATRIST

## 2025-04-10 RX ORDER — NAPROXEN 500 MG/1
500 TABLET ORAL
Qty: 60 TABLET | Refills: 1 | Status: SHIPPED | OUTPATIENT
Start: 2025-04-10

## 2025-04-10 RX ORDER — NAPROXEN 500 MG/1
250 TABLET ORAL
Qty: 60 TABLET | Refills: 1 | Status: SHIPPED | OUTPATIENT
Start: 2025-04-10 | End: 2025-04-10 | Stop reason: SDUPTHER

## 2025-04-10 RX ORDER — PHENTERMINE HYDROCHLORIDE 37.5 MG/1
37.5 TABLET ORAL
Qty: 30 TABLET | Refills: 0 | Status: SHIPPED | OUTPATIENT
Start: 2025-04-10 | End: 2025-05-10

## 2025-04-10 ASSESSMENT — PATIENT HEALTH QUESTIONNAIRE - PHQ9
1. LITTLE INTEREST OR PLEASURE IN DOING THINGS: NOT AT ALL
2. FEELING DOWN, DEPRESSED OR HOPELESS: NOT AT ALL
SUM OF ALL RESPONSES TO PHQ9 QUESTIONS 1 AND 2: 0

## 2025-04-10 ASSESSMENT — ENCOUNTER SYMPTOMS
DEPRESSION: 0
OCCASIONAL FEELINGS OF UNSTEADINESS: 0
LOSS OF SENSATION IN FEET: 0

## 2025-04-10 NOTE — PROGRESS NOTES
Subjective   Patient ID: Dora Guillermo is a 80 y.o. female who presents for Results (Patient is here to discuss results of blood work.).  History of Present Illness  Dora Guillermo is a 80 year old female who presents with back pain exacerbated by caregiving activities. She is accompanied by her daughter, who assists with caregiving duties.    She experiences back pain exacerbated by increased physical demands related to caregiving for her , who had a toe amputation last November. The pain occasionally radiates down her leg. She has been taking naproxen sodium 500 mg twice daily, which provides some relief.    She is responsible for household tasks such as meal preparation and cleaning, which she finds challenging due to her back pain. She is considering purchasing prepared meals to reduce her workload.    She wants to increase her physical activity and improve her endurance, particularly for walking, and is seeking guidance on exercises to strengthen her back muscles. She also mentions a goal to lose weight, as she feels that carrying extra weight contributes to her discomfort.    She has been taking her  to weekly memory care cafes, which he initially resisted but now enjoys.    Review of Systems  12 Systems have been reviewed as follows.  Constitutional: Fever, weight gain, weight loss, appetite change, night sweats, fatigue, chills.  Eyes : blurry, double vision, vision, loss, tearing, redness, pain, sensitivity to light, glaucoma.  Ears, nose, mouth, and throat: Hearing loss, ringing in the ears, ear pain, nasal congestion, nasal drainage, nosebleeds, mouth, throat, irritation tooth problem.  Cardiovascular :chest pain, pressure, heart racing, palpitations, sweating, leg swelling, high or low blood pressure  Pulmonary: Cough, yellow or green sputum, blood and sputum, shortness of breath, wheezing  Gastrointestinal: Nausea, vomiting, diarrhea, constipation, pain, blood in stool, or vomitus,  heartburn, difficulty swallowing  Genitourinary: incontinence, abnormal bleeding, abnormal discharge, urinary frequency, urinary hesitancy, pain, impotence sexual problem, infection, urinary retention  Musculoskeletal: Pain, stiffness, joint, redness or warmth, arthritis, back pain, weakness, muscle wasting, sprain or fracture  Neuro: Weight weakness, dizziness, change in voice, change in taste change in vision, change in hearing, loss, or change of sensation, trouble walking, balance problems coordination problems, shaking, speech problem  Endocrine , cold or heat intolerance, blood sugar problem, weight gain or loss missed periods hot flashes, sweats, change in body hair, change in libido, increased thirst, increased urination  Heme/lymph: Swelling, bleeding, problem anemia, bruising, enlarged lymph nodes  Allergic/immunologic: H. plus nasal drip, watery itchy eyes, nasal drainage, immunosuppressed  The above were reviewed and noted negative except as noted in HPI and Problem List.    Objective     /66   Pulse 62   Temp 36.7 °C (98.1 °F) (Temporal)   Wt 113 kg (250 lb 3.2 oz)   SpO2 96%   BMI 40.38 kg/m²      Physical Exam    Constitutional: Well developed, well nourished, alert and in no acute distress   Eyes: Normal external exam. Pupils equally round and reactive to light with normal accommodation and extraocular movements intact.  Neck: Supple, no lymphadenopathy or masses.   Cardiovascular: Regular rate and rhythm, normal S1 and S2, no murmurs, gallops, or rubs. Radial pulses normal. No peripheral edema.  Pulmonary: No respiratory distress, lungs clear to auscultation bilaterally. No wheezes, rhonchi, rales.  Abdomen: soft,non tender, non distended, without masses or HSM  Skin: Warm, well perfused, normal skin turgor and color.   Neurologic: Cranial nerves II-XII grossly intact.   Psychiatric: Mood calm and affect normal  Musculoskeletal: Moving all extremities without restriction  The above were  reviewed and noted negative except as noted in HPI and Problem List.      Results           Assessment & Plan  Osteoarthritis of lumbar spine  Chronic back pain due to osteoarthritis, exacerbated by physical activity and caregiving responsibilities. Some relief with naproxen. Exercise is important to strengthen back muscles and improve endurance.  - Refill naproxen for pain management  - Encourage exercises to strengthen back muscles  - Advise on incorporating more exercise into daily routine    Obesity  Desires weight loss to improve endurance and reduce back strain. Weight management is crucial for overall health and back pain relief. Prescribed Adipex (phentermine) for weight loss, to be taken once daily in the morning before meals.  - Prescribe Adipex (phentermine) for weight loss, to be taken once daily in the morning before meals    Problem List Items Addressed This Visit       CKD stage G3a/A1, GFR 45-59 and albumin creatinine ratio <30 mg/g (Multi)    Relevant Orders    Follow Up In [facility] - PCP - Established    Hyperlipidemia    Relevant Orders    Follow Up [facility] - PCP - Established    Hypertension    Relevant Orders    Follow Up [facility] - PCP - Established    Primary osteoarthritis of right knee    Relevant Medications    naproxen (Naprosyn) 500 mg tablet    Other Relevant Orders    Follow Up [facility] - PCP - Established    History of total right knee replacement    Relevant Orders    Follow Up [facility] - PCP - Established    Osteoarthritis of lumbar spine    Relevant Medications    naproxen (Naprosyn) 500 mg tablet    Other Relevant Orders    Follow Up In [facility] - PCP - Established    Obesity, morbid (Multi)    Relevant Medications    phentermine (Adipex-P) 37.5 mg tablet    Other Relevant Orders    Follow Up [facility] - PCP - Established     Other Visit Diagnoses       Healthcare maintenance         Relevant Orders    Follow Up [facility] - PCP - Established    Vitamin D deficiency        Relevant Orders    Follow Up [facility] - PCP - Established    Allergic rhinitis, unspecified seasonality, unspecified trigger        Relevant Orders    Follow Up [facility] - PCP - Established    Encounter for screening mammogram for malignant neoplasm of breast        Relevant Orders    Follow Up [facility] - PCP - Established    UTI symptoms        Relevant Orders    Follow Up [facility] - PCP - Established    Encounter for colorectal cancer screening using Cologuard test        Relevant Orders    Follow Up [facility] - PCP - Established    Burning with urination        Relevant Orders    Follow Up [facility] - PCP - Established    Need for influenza vaccination        Relevant Orders    Follow Up [facility] - PCP - Established    Positive colorectal cancer screening using Cologuard test        Relevant Orders    Follow Up [facility] - PCP - Established    Colon cancer screening        Relevant Orders    Follow Up [facility] - PCP - Established    Fatigue, unspecified type        Relevant Orders    Follow Up In [facility] - PCP - Established    Hyperglycemia        Relevant Orders    Follow Up In [facility] - PCP - Established                 Continue current medications and therapy for chronic medical conditions     RX for rollator given to patient.      Left Shoulder has improved      Consider low back xr next.      thyroid screen once per year      Colonoscopy ordered within normal limits      [facility]q     Pharmacy     Fu 1 months     BW prior see GFR     All BW Q 4 months     BW q 3-4 months    Bassam Zafar MD       This medical note was created with the assistance of artificial intelligence (AI) for documentation purposes. The content has  been reviewed and confirmed by the healthcare provider for accuracy and completeness. Patient consented to the use of audio recording and use of AI during their visit.

## 2025-04-10 NOTE — PROGRESS NOTES
History Of Present Illness  Dora Guillermo is a 80 y.o. female presenting with complaint of possible nail fungus b/l feet.  She has been using Tea tree oil on toes and kerasal. Nails have been disfigured for years     PCP Bassam Zafar MD  Last visit 4/10/25     Past Medical History  She has a past medical history of Hypertension, Personal history of other diseases of the digestive system, Personal history of other diseases of the musculoskeletal system and connective tissue, Personal history of other diseases of the musculoskeletal system and connective tissue, Personal history of other diseases of the respiratory system, Personal history of other infectious and parasitic diseases, Personal history of other malignant neoplasm of skin, Personal history of other medical treatment, Physical debility (03/27/2023), and Pure hypercholesterolemia, unspecified.    Surgical History  She has a past surgical history that includes Other surgical history (12/04/2019); Other surgical history (12/04/2019); Other surgical history (12/04/2019); Other surgical history (12/04/2019); Other surgical history (12/04/2019); Other surgical history (12/04/2019); Other surgical history (12/04/2019); Other surgical history (03/23/2022); Other surgical history (03/23/2022); Other surgical history (03/23/2022); Other surgical history (03/23/2022); Other surgical history (03/23/2022); and Implant.     Social History  She reports that she has never smoked. She has never used smokeless tobacco. She reports that she does not drink alcohol and does not use drugs.    Family History  Family History   Problem Relation Name Age of Onset    Other (cardiac disorder) Mother      Coronary artery disease Mother      Diabetes Mother      Hypertension Mother      Dementia Father          Allergies  Bactrim [sulfamethoxazole-trimethoprim], Cat dander, Clarithromycin, Montelukast, and Morphine    Medications  Current Outpatient Medications   Medication Sig  Dispense Refill    amLODIPine (Norvasc) 5 mg tablet TAKE 1 TABLET DAILY 90 tablet 1    atorvastatin (Lipitor) 40 mg tablet TAKE 1 TABLET ONCE DAILY 90 tablet 1    calcium carbonate 600 mg calcium (1,500 mg) tablet Take 1 tablet (1,500 mg) by mouth once daily.      cholecalciferol (Vitamin D3) 50 MCG (2000 UT) tablet Take 1 tablet (2,000 Units) by mouth once daily.      dapagliflozin propanediol (Farxiga) 5 mg Take 1 tablet (5 mg) by mouth once daily. 90 tablet 3    fluticasone (Flonase) 50 mcg/actuation nasal spray Administer 2 sprays into each nostril once daily. Shake gently. Before first use, prime pump. After use, clean tip and replace cap. (Patient taking differently: Administer 2 sprays into each nostril once daily. Shake gently. Before first use, prime pump. After use, clean tip and replace cap. PRN) 16 g 2    lisinopril 40 mg tablet TAKE 1 TABLET DAILY 90 tablet 1    mirabegron (Myrbetriq) 25 mg tablet extended release 24 hr 24 hr tablet Take 1 tablet (25 mg) by mouth once daily. 90 tablet 3    multivit-iron-minerals-folic acid (Sentry Senior) 0.4 mg-300 mcg- 250 mcg tab Take 1 tablet by mouth once daily.      naproxen (Naprosyn) 500 mg tablet Take 0.5 tablets (250 mg) by mouth 2 times daily (morning and late afternoon). 60 tablet 1    phentermine (Adipex-P) 37.5 mg tablet Take 1 tablet (37.5 mg) by mouth once daily in the morning. Take before meals. 30 tablet 0    triamcinolone (Kenalog) 0.1 % cream Apply topically 2 times a day.      walker misc Use as directed. 1 each 0     No current facility-administered medications for this visit.       Review of Systems    REVIEW OF SYSTEMS  GENERAL:  Negative for malaise, significant weight loss, fever  CARDIOVASCULAR: leg swelling   MUSCULOSKELETAL:  Negative for joint pain or swelling, back pain, and muscle pain.  SKIN:  Negative for lesions, rash, and itching  PSYCH:  Negative for sleep disturbance, mood disorder and recent psychosocial stressors  NEURO: Negative,  denies any burning, tingling or numbness     Objective:   Vasc: DP and PT pulses are palpable bilateral.  CFT is less than 3 seconds bilateral.  Skin temperature is warm to cool proximal to distal bilateral.      Neuro:  Light touch is intact to the foot bilateral.  Protective sensation is intact to the foot when tested with the 5.07 SWM bilateral.  There is no clonus noted.  The hallux is downgoing bilateral.      Derm: Nails 1-are thickened, elongated and crumbly with subungual debris. Pt appearsm to have nail bed damage  Skin is supple with normal texture and turgor noted.  Webspaces are clean, dry and intact bilateral.  There are no hyperkeratoses, ulcerations, verruca or other lesions noted.      Ortho: Muscle strength is 5/5 for all pedal groups tested.  Ankle joint, subtalar joint, 1st MPJ and lesser MPJ ROM is full and without pain or crepitus.  The foot type is rectus bilateral off weight bearing.  There are no structural deformities noted.    Assessment/Plan     Diagnoses and all orders for this visit:  Physical debility  Onychomycosis  Pain in toes of both feet      Reassured pt that she is doing the right measures to help her toes   Unfortunately, she has permanent damage causing disfigurement  Cont local care  Toenails are debrided in length and thickness to avoid infection and for pain relief

## 2025-04-10 NOTE — TELEPHONE ENCOUNTER
Picked up naproxen after appt today and said it was for the wrong dosage. Should be 500mg tablet 2 times a day..     Not half a tablet.     Please call in fixed script. And requesting 60 day supply.

## 2025-04-16 DIAGNOSIS — Z96.651 TOTAL KNEE REPLACEMENT STATUS, RIGHT: Primary | ICD-10-CM

## 2025-04-24 NOTE — PROGRESS NOTES
Chief Complaint   Patient presents with    Right Knee - Follow-up     TKA 4/26/24  Xrays today       The patient is here for follow-up of their side: right knee arthroplasty.  The patient has no knee pain.  The patient has no mechanical symptoms.  The patient has no swelling.  The patient is approximately 2 year(s) postop    Physical examination:  Examination of the side: right knee  The incision is  healed  No erythema or warmth.  No instability varus or valgus stressing the knee at 0, 30 or 60 degrees.  No instability in the AP plane at 90 degrees.  Range of motion: 0 degrees extension, 120 degrees flexion  There is no tenderness  Calf is soft, Homans negative  The patient has intact ankle dorsiflexion and plantarflexion.      Radiographs:   XR knee right 3 views  Status: Final result     PACS Images - [id number]     Show images for XR knee right 3 views  In Basket Actions    Done  Result Mgmt     View in In Basket  Signed by    Signed Time Phone Pager   Brian Osborne MD 4/25/2025 08:45 [phone]      Exam Information    Status Exam Begun Exam Ended   Final 4/25/2025 08:18 4/25/2025 08:23     Study Result    Narrative & Impression   Interpreted By:  Brian Osborne,   STUDY:  XR KNEE RIGHT 3 VIEWS; ; 4/25/2025 8:23 am      INDICATION:  Signs/Symptoms:pain.      ACCESSION NUMBER(S):  [id number]      ORDERING CLINICIAN:  BRIAN OSBORNE      FINDINGS:  Right knee films show a total knee arthroplasty in satisfactory  position. The patella is well positioned. No fracture is identified.  No obvious loosening or wear is appreciated. There is faint  heterotopic bone seen in the distal quadriceps insertion on the  patella.          Signed by: Brian Osborne 4/25/2025 8:45 AM  Dictation workstation:   [id number]           Impression:  Status post side: right total knee arthroplasty    Plan:  Discussed the importance of prophylactic dental antibiotics  Follow up in 1 year  All questions answered

## 2025-04-25 ENCOUNTER — OFFICE VISIT (OUTPATIENT)
Dept: ORTHOPEDIC SURGERY | Facility: CLINIC | Age: 80
End: 2025-04-25
Payer: COMMERCIAL

## 2025-04-25 ENCOUNTER — HOSPITAL ENCOUNTER (OUTPATIENT)
Dept: RADIOLOGY | Facility: CLINIC | Age: 80
Discharge: HOME | End: 2025-04-25
Payer: COMMERCIAL

## 2025-04-25 DIAGNOSIS — Z96.651 TOTAL KNEE REPLACEMENT STATUS, RIGHT: Primary | ICD-10-CM

## 2025-04-25 DIAGNOSIS — Z96.651 TOTAL KNEE REPLACEMENT STATUS, RIGHT: ICD-10-CM

## 2025-04-25 PROCEDURE — 99212 OFFICE O/P EST SF 10 MIN: CPT | Performed by: ORTHOPAEDIC SURGERY

## 2025-04-25 PROCEDURE — 73562 X-RAY EXAM OF KNEE 3: CPT | Mod: RT

## 2025-04-26 DIAGNOSIS — I10 HYPERTENSION, UNSPECIFIED TYPE: ICD-10-CM

## 2025-04-28 NOTE — TELEPHONE ENCOUNTER
Recent Visits  Date Type Provider Dept   04/10/25 Office Visit Bassam Zafar MD [facility]   02/28/25 Office Visit Bassam Zafar MD [facility]   12/23/24 Office Visit Bassam Zafar MD [facility]   11/21/24 Office Visit Bassam Zafar MD [facility]   09/19/24 Office Visit Bassam Zafar MD [facility]   07/19/24 Office Visit Bassam Zafar MD [facility]   05/14/24 Office Visit Bassam Zafar MD [facility]   Showing recent visits within past 365 days and meeting all other requirements  Future Appointments  Date Type Provider Dept   05/12/25 Appointment Bassam Zafar MD [facility]   Showing future appointments within next 90 days and meeting all other requirements

## 2025-04-29 RX ORDER — LISINOPRIL 40 MG/1
40 TABLET ORAL DAILY
Qty: 90 TABLET | Refills: 1 | Status: SHIPPED | OUTPATIENT
Start: 2025-04-29

## 2025-05-12 ENCOUNTER — APPOINTMENT (OUTPATIENT)
Dept: PRIMARY CARE | Facility: CLINIC | Age: 80
End: 2025-05-12
Payer: COMMERCIAL

## 2025-05-12 VITALS
RESPIRATION RATE: 16 BRPM | BODY MASS INDEX: 39.63 KG/M2 | WEIGHT: 246.6 LBS | HEIGHT: 66 IN | HEART RATE: 70 BPM | TEMPERATURE: 97.3 F | DIASTOLIC BLOOD PRESSURE: 64 MMHG | OXYGEN SATURATION: 95 % | SYSTOLIC BLOOD PRESSURE: 116 MMHG

## 2025-05-12 DIAGNOSIS — E11.649 TYPE 2 DIABETES MELLITUS WITH HYPOGLYCEMIA WITHOUT COMA, WITHOUT LONG-TERM CURRENT USE OF INSULIN: ICD-10-CM

## 2025-05-12 DIAGNOSIS — R53.83 FATIGUE, UNSPECIFIED TYPE: ICD-10-CM

## 2025-05-12 DIAGNOSIS — E55.9 VITAMIN D DEFICIENCY: ICD-10-CM

## 2025-05-12 DIAGNOSIS — Z12.12 ENCOUNTER FOR COLORECTAL CANCER SCREENING USING COLOGUARD TEST: ICD-10-CM

## 2025-05-12 DIAGNOSIS — J30.9 ALLERGIC RHINITIS, UNSPECIFIED SEASONALITY, UNSPECIFIED TRIGGER: ICD-10-CM

## 2025-05-12 DIAGNOSIS — Z12.11 ENCOUNTER FOR COLORECTAL CANCER SCREENING USING COLOGUARD TEST: ICD-10-CM

## 2025-05-12 DIAGNOSIS — Z12.31 ENCOUNTER FOR SCREENING MAMMOGRAM FOR MALIGNANT NEOPLASM OF BREAST: ICD-10-CM

## 2025-05-12 DIAGNOSIS — M17.11 PRIMARY OSTEOARTHRITIS OF RIGHT KNEE: ICD-10-CM

## 2025-05-12 DIAGNOSIS — Z00.00 ROUTINE GENERAL MEDICAL EXAMINATION AT HEALTH CARE FACILITY: Primary | ICD-10-CM

## 2025-05-12 DIAGNOSIS — E78.2 MIXED HYPERLIPIDEMIA: ICD-10-CM

## 2025-05-12 DIAGNOSIS — Z23 NEED FOR INFLUENZA VACCINATION: ICD-10-CM

## 2025-05-12 DIAGNOSIS — I10 PRIMARY HYPERTENSION: ICD-10-CM

## 2025-05-12 DIAGNOSIS — Z12.11 COLON CANCER SCREENING: ICD-10-CM

## 2025-05-12 DIAGNOSIS — Z00.00 HEALTHCARE MAINTENANCE: ICD-10-CM

## 2025-05-12 DIAGNOSIS — R30.0 BURNING WITH URINATION: ICD-10-CM

## 2025-05-12 DIAGNOSIS — R39.9 UTI SYMPTOMS: ICD-10-CM

## 2025-05-12 DIAGNOSIS — E66.01 OBESITY, MORBID (MULTI): ICD-10-CM

## 2025-05-12 DIAGNOSIS — M47.26 OSTEOARTHRITIS OF SPINE WITH RADICULOPATHY, LUMBAR REGION: ICD-10-CM

## 2025-05-12 DIAGNOSIS — Z96.651 HISTORY OF TOTAL RIGHT KNEE REPLACEMENT: ICD-10-CM

## 2025-05-12 DIAGNOSIS — N18.31 CKD STAGE G3A/A1, GFR 45-59 AND ALBUMIN CREATININE RATIO <30 MG/G (MULTI): ICD-10-CM

## 2025-05-12 DIAGNOSIS — R19.5 POSITIVE COLORECTAL CANCER SCREENING USING COLOGUARD TEST: ICD-10-CM

## 2025-05-12 DIAGNOSIS — R73.9 HYPERGLYCEMIA: ICD-10-CM

## 2025-05-12 PROCEDURE — G0439 PPPS, SUBSEQ VISIT: HCPCS | Performed by: FAMILY MEDICINE

## 2025-05-12 PROCEDURE — 99214 OFFICE O/P EST MOD 30 MIN: CPT | Performed by: FAMILY MEDICINE

## 2025-05-12 RX ORDER — NAPROXEN 250 MG/1
500 TABLET ORAL
Start: 2025-05-12

## 2025-05-12 ASSESSMENT — ACTIVITIES OF DAILY LIVING (ADL)
DRESSING: INDEPENDENT
GROCERY_SHOPPING: INDEPENDENT
DOING_HOUSEWORK: INDEPENDENT
BATHING: INDEPENDENT
TAKING_MEDICATION: INDEPENDENT
MANAGING_FINANCES: INDEPENDENT

## 2025-05-12 ASSESSMENT — ENCOUNTER SYMPTOMS
OCCASIONAL FEELINGS OF UNSTEADINESS: 0
DEPRESSION: 0
LOSS OF SENSATION IN FEET: 0

## 2025-05-12 NOTE — PROGRESS NOTES
"Subjective   Reason for Visit: Dora Guillermo is an 80 y.o. female here for a Medicare Wellness visit.          Reviewed all medications by prescribing practitioner or clinical pharmacist (such as prescriptions, OTCs, herbal therapies and supplements) and documented in the medical record.    HPI    Patient Care Team:  Bassam Zafar MD as PCP - General (Family Medicine)  Bassam Zafar MD as PCP - [facility] [employer] PCP     Review of Systems    Objective   Vitals:  /64 (BP Location: Left arm, Patient Position: Sitting, BP Cuff Size: Large adult)   Pulse 70   Temp 36.3 °C (97.3 °F) (Temporal)   Resp 16   Ht 1.676 m (5' 6\")   Wt 112 kg (246 lb 9.6 oz)   SpO2 95%   BMI 39.80 kg/m²       Physical Exam    Assessment & Plan  Healthcare maintenance    Orders:    Follow Up In [facility] - PCP - Established    Primary hypertension    Orders:    Follow Up In [facility] - PCP - Established    Primary osteoarthritis of right knee    Orders:    Follow Up In [facility] - PCP - Established    History of total right knee replacement    Orders:    Follow Up In [facility] - PCP - Established    CKD stage G3a/A1, GFR 45-59 and albumin creatinine ratio <30 mg/g (Multi)    Orders:    Follow Up In [facility] - PCP - Established    Osteoarthritis of spine with radiculopathy, lumbar region    Orders:    Follow Up In [facility] - PCP - Established    Mixed hyperlipidemia    Orders:    Follow Up In [facility] - PCP - Established    Vitamin D deficiency    Orders:    Follow Up In [facility] - PCP - Established    Allergic rhinitis, unspecified seasonality, unspecified trigger    Orders:    Follow Up In [facility] - PCP - Established    Encounter for screening mammogram for malignant neoplasm of breast    Orders:    Follow Up In [facility] - PCP - Established    UTI symptoms    Orders:    Follow Up In " [facility] - PCP - Established    Encounter for colorectal cancer screening using Cologuard test    Orders:    Follow Up In [facility] - PCP - Established    Burning with urination    Orders:    Follow Up In [facility] - PCP - Established    Need for influenza vaccination    Orders:    Follow Up In [facility] - PCP - Established    Positive colorectal cancer screening using Cologuard test    Orders:    Follow Up In [facility] - PCP - Established    Colon cancer screening    Orders:    Follow Up In [facility] - PCP - Established    Fatigue, unspecified type    Orders:    Follow Up In [facility] - PCP - Established    Hyperglycemia    Orders:    Follow Up In [facility] - PCP - Established    Obesity, morbid (Multi)    Orders:    Follow Up In [facility] - PCP - Established    Routine general medical examination at health care facility    Orders:    1 Year Follow Up In [facility] - PCP - Wellness Exam; Future

## 2025-05-12 NOTE — PROGRESS NOTES
Subjective   Patient ID: Dora Guillermo is a 80 y.o. female who presents for Weight Loss (Patient reports that she is using Phentermine to manage weight loss. Patient states that she only took 3 pills due to palpitations she was experiencing.) and Medicare Annual Wellness Visit Subsequent.  History of Present Illness  Dora Guillermo is a 80 year old female who presents for medication management and follow-up.    She is adjusting her Naprosyn dosage from 500 mg to 250 mg twice a day due to it being too high for her back pain management.    She has been using phentermine for weight management but experiences undesirable effects even with a half pill, leading her to avoid it. She manages her weight by focusing on her diet, which includes two regular-sized meals a day with yogurt, applesauce, and protein, and she does not crave sweets.    Her , who is 84 years old, is experiencing worsening health issues, including difficulty walking, decreased oxygen levels, and memory loss. His condition affects her daily life as he requires frequent doctor visits.    She recently had a colonoscopy, which was reported as fine. Her recent blood work showed an A1c of 5.8, vitamin D level of 60, cholesterol of 140, LDL of 61, and slightly off kidney function and MCHC, but otherwise normal red and white blood cell counts.    She reports sleeping well despite the stress of her 's health issues. No thyroid issues.    Review of Systems  12 Systems have been reviewed as follows.  Constitutional: Fever, weight gain, weight loss, appetite change, night sweats, fatigue, chills.  Eyes : blurry, double vision, vision, loss, tearing, redness, pain, sensitivity to light, glaucoma.  Ears, nose, mouth, and throat: Hearing loss, ringing in the ears, ear pain, nasal congestion, nasal drainage, nosebleeds, mouth, throat, irritation tooth problem.  Cardiovascular :chest pain, pressure, heart racing, palpitations, sweating, leg swelling, high  "or low blood pressure  Pulmonary: Cough, yellow or green sputum, blood and sputum, shortness of breath, wheezing  Gastrointestinal: Nausea, vomiting, diarrhea, constipation, pain, blood in stool, or vomitus, heartburn, difficulty swallowing  Genitourinary: incontinence, abnormal bleeding, abnormal discharge, urinary frequency, urinary hesitancy, pain, impotence sexual problem, infection, urinary retention  Musculoskeletal: Pain, stiffness, joint, redness or warmth, arthritis, back pain, weakness, muscle wasting, sprain or fracture  Neuro: Weight weakness, dizziness, change in voice, change in taste change in vision, change in hearing, loss, or change of sensation, trouble walking, balance problems coordination problems, shaking, speech problem  Endocrine , cold or heat intolerance, blood sugar problem, weight gain or loss missed periods hot flashes, sweats, change in body hair, change in libido, increased thirst, increased urination  Heme/lymph: Swelling, bleeding, problem anemia, bruising, enlarged lymph nodes  Allergic/immunologic: H. plus nasal drip, watery itchy eyes, nasal drainage, immunosuppressed  The above were reviewed and noted negative except as noted in HPI and Problem List.    Objective     /64 (BP Location: Left arm, Patient Position: Sitting, BP Cuff Size: Large adult)   Pulse 70   Temp 36.3 °C (97.3 °F) (Temporal)   Resp 16   Ht 1.676 m (5' 6\")   Wt 112 kg (246 lb 9.6 oz)   SpO2 95%   BMI 39.80 kg/m²      Physical Exam    Constitutional: Well developed, well nourished, alert and in no acute distress   Eyes: Normal external exam. Pupils equally round and reactive to light with normal accommodation and extraocular movements intact.  Neck: Supple, no lymphadenopathy or masses.   Cardiovascular: Regular rate and rhythm, normal S1 and S2, no murmurs, gallops, or rubs. Radial pulses normal. No peripheral edema.  Pulmonary: No respiratory distress, lungs clear to auscultation bilaterally. No " wheezes, rhonchi, rales.  Abdomen: soft,non tender, non distended, without masses or HSM  Skin: Warm, well perfused, normal skin turgor and color.   Neurologic: Cranial nerves II-XII grossly intact.   Psychiatric: Mood calm and affect normal  Musculoskeletal: Moving all extremities without restriction  The above were reviewed and noted negative except as noted in HPI and Problem List.      Results  LABS  A1c: 5.8%  Vitamin D: 60 ng/mL  Cholesterol: 140 mg/dL  LDL: 61 mg/dL  MCHC: abnormal    DIAGNOSTIC  Colonoscopy: normal         Assessment & Plan  Morbid obesity  Management is challenging due to intolerance to phentermine and reluctance to try injectables like Ozempic. Prefers lifestyle modifications, including dietary changes, to manage weight. Current plan includes two regular-sized meals a day with balanced nutrition, avoiding sweets, and not overeating at buffets. Stress from 's health issues may impact efforts.  - Reassess weight management in three months.  - Consider Ozempic or Mounjaro if weight loss of 10-15 pounds is not achieved in three months.    Osteoarthritis of lumbar spine  Chronic condition with current management using Naproxen 500 mg twice daily causing adverse effects.  - Reduce Naproxen dosage to 250 mg twice daily.    General Health Maintenance  Recent blood work shows good control of A1c, vitamin D, and cholesterol levels. Kidney function and MCHC slightly off but not concerning. No thyroid issues reported.  - Order blood work for next visit.    Problem List Items Addressed This Visit       CKD stage G3a/A1, GFR 45-59 and albumin creatinine ratio <30 mg/g (Multi)      Orders:    Follow Up In [facility] - PCP - Established           Relevant Orders    Comprehensive Metabolic Panel    Hyperlipidemia      Orders:    Follow Up In [facility] - PCP - Established           Relevant Orders    Lipid Panel    Hypertension      Orders:    Follow Up In [facility]  - PCP - Established           Primary osteoarthritis of right knee      Orders:    Follow Up In [facility] - PCP - Established           Relevant Medications    naproxen (Naprosyn) 250 mg tablet    History of total right knee replacement      Orders:    Follow Up In [facility] - PCP - Established           Osteoarthritis of lumbar spine      Orders:    Follow Up In [facility] - PCP - Established           Relevant Medications    naproxen (Naprosyn) 250 mg tablet    Obesity, morbid (Multi)      Orders:    Follow Up In [facility] - PCP - Established            Other Visit Diagnoses         Routine general medical examination at health care facility    -  Primary    Relevant Orders    1 Year Follow Up In [facility] - PCP - Wellness Exam      Healthcare maintenance          Vitamin D deficiency        Relevant Orders    Vitamin D 25-Hydroxy,Total (for eval of Vitamin D levels)      Allergic rhinitis, unspecified seasonality, unspecified trigger          Encounter for screening mammogram for malignant neoplasm of breast          UTI symptoms          Encounter for colorectal cancer screening using Cologuard test          Burning with urination          Need for influenza vaccination          Positive colorectal cancer screening using Cologuard test          Colon cancer screening          Fatigue, unspecified type        Relevant Orders    CBC and Auto Differential      Hyperglycemia             Continue current medications and therapy for chronic medical conditions     RX for rollator given to patient.      Left Shoulder has improved      Consider low back xr next.      thyroid screen once per year      Colonoscopy ordered within normal limits      Farxiga & myrbetriq     Pharmacy     Fu 1 months     BW prior see GFR     All BW Q 4 months     BW q 3-4 months    Bassam Zafar MD       This medical note was created with the assistance of artificial intelligence  (AI) for documentation purposes. The content has been reviewed and confirmed by the healthcare provider for accuracy and completeness. Patient consented to the use of audio recording and use of AI during their visit.

## 2025-05-12 NOTE — PROGRESS NOTES
Subjective   Patient ID: Dora Guillermo is a 80 y.o. female who presents for Weight Loss (Patient reports that she is using Phentermine to manage weight loss. Patient states that she only took 3 pills due to palpitations she was experiencing.) and Medicare Annual Wellness Visit Subsequent.  History of Present Illness      Review of Systems  12 Systems have been reviewed as follows.  Constitutional: Fever, weight gain, weight loss, appetite change, night sweats, fatigue, chills.  Eyes : blurry, double vision, vision, loss, tearing, redness, pain, sensitivity to light, glaucoma.  Ears, nose, mouth, and throat: Hearing loss, ringing in the ears, ear pain, nasal congestion, nasal drainage, nosebleeds, mouth, throat, irritation tooth problem.  Cardiovascular :chest pain, pressure, heart racing, palpitations, sweating, leg swelling, high or low blood pressure  Pulmonary: Cough, yellow or green sputum, blood and sputum, shortness of breath, wheezing  Gastrointestinal: Nausea, vomiting, diarrhea, constipation, pain, blood in stool, or vomitus, heartburn, difficulty swallowing  Genitourinary: incontinence, abnormal bleeding, abnormal discharge, urinary frequency, urinary hesitancy, pain, impotence sexual problem, infection, urinary retention  Musculoskeletal: Pain, stiffness, joint, redness or warmth, arthritis, back pain, weakness, muscle wasting, sprain or fracture  Neuro: Weight weakness, dizziness, change in voice, change in taste change in vision, change in hearing, loss, or change of sensation, trouble walking, balance problems coordination problems, shaking, speech problem  Endocrine , cold or heat intolerance, blood sugar problem, weight gain or loss missed periods hot flashes, sweats, change in body hair, change in libido, increased thirst, increased urination  Heme/lymph: Swelling, bleeding, problem anemia, bruising, enlarged lymph nodes  Allergic/immunologic: H. plus nasal drip, watery itchy eyes, nasal drainage,  "immunosuppressed  The above were reviewed and noted negative except as noted in HPI and Problem List.    Objective     /64 (BP Location: Left arm, Patient Position: Sitting, BP Cuff Size: Large adult)   Pulse 70   Temp 36.3 °C (97.3 °F) (Temporal)   Resp 16   Ht 1.676 m (5' 6\")   Wt 112 kg (246 lb 9.6 oz)   SpO2 95%   BMI 39.80 kg/m²      Physical Exam    Constitutional: Well developed, well nourished, alert and in no acute distress   Eyes: Normal external exam. Pupils equally round and reactive to light with normal accommodation and extraocular movements intact.  Neck: Supple, no lymphadenopathy or masses.   Cardiovascular: Regular rate and rhythm, normal S1 and S2, no murmurs, gallops, or rubs. Radial pulses normal. No peripheral edema.  Pulmonary: No respiratory distress, lungs clear to auscultation bilaterally. No wheezes, rhonchi, rales.  Abdomen: soft,non tender, non distended, without masses or HSM  Skin: Warm, well perfused, normal skin turgor and color.   Neurologic: Cranial nerves II-XII grossly intact.   Psychiatric: Mood calm and affect normal  Musculoskeletal: Moving all extremities without restriction  The above were reviewed and noted negative except as noted in HPI and Problem List.      Results           Assessment & Plan      Problem List Items Addressed This Visit       CKD stage G3a/A1, GFR 45-59 and albumin creatinine ratio <30 mg/g (Multi)    Hyperlipidemia    Hypertension    Primary osteoarthritis of right knee    History of total right knee replacement    Osteoarthritis of lumbar spine    Obesity, morbid (Multi)     Other Visit Diagnoses         Healthcare maintenance          Vitamin D deficiency          Allergic rhinitis, unspecified seasonality, unspecified trigger          Encounter for screening mammogram for malignant neoplasm of breast          UTI symptoms          Encounter for colorectal cancer screening using Cologuard test          Burning with urination          " Need for influenza vaccination          Positive colorectal cancer screening using Cologuard test          Colon cancer screening          Fatigue, unspecified type          Hyperglycemia                 Bassam Zafar MD       This medical note was created with the assistance of artificial intelligence (AI) for documentation purposes. The content has been reviewed and confirmed by the healthcare provider for accuracy and completeness. Patient consented to the use of audio recording and use of AI during their visit.

## 2025-05-30 ENCOUNTER — TELEMEDICINE (OUTPATIENT)
Dept: PHARMACY | Facility: HOSPITAL | Age: 80
End: 2025-05-30
Payer: COMMERCIAL

## 2025-05-30 DIAGNOSIS — N18.31 CKD STAGE G3A/A1, GFR 45-59 AND ALBUMIN CREATININE RATIO <30 MG/G (MULTI): ICD-10-CM

## 2025-05-30 DIAGNOSIS — N32.89 BLADDER SPASM: ICD-10-CM

## 2025-05-30 DIAGNOSIS — N18.31 CKD STAGE G3A/A1, GFR 45-59 AND ALBUMIN CREATININE RATIO <30 MG/G (MULTI): Primary | ICD-10-CM

## 2025-06-06 PROCEDURE — RXMED WILLOW AMBULATORY MEDICATION CHARGE

## 2025-06-06 RX ORDER — MIRABEGRON 25 MG/1
25 TABLET, FILM COATED, EXTENDED RELEASE ORAL DAILY
Qty: 90 TABLET | Refills: 3 | Status: SHIPPED | OUTPATIENT
Start: 2025-06-06

## 2025-06-06 RX ORDER — DAPAGLIFLOZIN 5 MG/1
5 TABLET, FILM COATED ORAL DAILY
Qty: 90 TABLET | Refills: 3 | Status: SHIPPED | OUTPATIENT
Start: 2025-06-06 | End: 2026-06-06

## 2025-06-06 NOTE — PROGRESS NOTES
Clinical Pharmacy Appointment    Patient ID: Dora Guillermo is a 80 y.o. female who presents for Chronic Kidney Disease and bladder spasms.    Pt is here for Follow Up appointment.  Referring Provider: Bassam Zafar MD - last visit: 5/12/25, next visit: 8/12/25  PCP: Bassam Zafar MD     Subjective   [facility]  PMH significant for CKD, bladder spasm, HTN, HLD DJD, osteoarthritis of multiple sites, basal cell carcinoma, glaucoma.  Special needs/barriers to therapy: None identified     Patient Assistance for Farxiga and Myrbetriq approved through 10/2/25. Will have to be renewed prior to that date to prevent lapse in coverage. Medication(s) will be received at no cost to patient from [facility].     Medication Reconciliation:  No changes    Drug Interactions  The following drug interactions were noted:    Drug-Drug: lisinopril and naproxen - Concurrent use may result in decreased antihypertensive effects and increased risk for renal hypoperfusion.    Medication System Management  Adherence/Organization: No current concerns  Affordability/Accessibility: No current concerns  Patient's preferred pharmacy:     [facility] [place] - [facility] AT Portal to [facility]  [place] [address]  Phone: [phone] Fax: [phone]    [facility] [address] [place] [address]  Phone: [phone] Fax: [phone]    [facility]  [address], [address]  [facility] [address]  Phone: [phone] Fax: [phone]      CHRONIC KIDNEY DISEASE  Does patient see nephrology? No    Lab Results   Component Value Date    EGFR 50 (L) 03/31/2025     Stage: 3a (eGFR 45-59)  Albuminuria: Unknown  ACE/ARB: Yes, lisinopril 40mg    Medication Review  Current medications and doses were reviewed and are appropriate per current kidney function.  The following medications increase  risk of MARCY and should be closely monitored:  Lisinopril  Naproxen    Hypertension History:  Diagnosis? Yes  At goal? Yes    Hyperlipidemia History:  Diagnosis? Yes  At goal? Yes    Diabetes History:  Diagnosis? No      GENITOURINARY CONDITIONS  Diagnosis: Bladder spams  Follows with Urology?: No    Pertinent PMH Review  Medications that may contribute to symptoms:   Calcium channel blockers (causing bladder to relax, improper emptying), SGLT2 inhibitors   Narrow-angle glaucoma: No  Impaired gastric emptying: No  Urinary retention: No  Uncontrolled DM: No  UTI or yeast infections: None recently    Pharmacological Therapy  Current Medications:   Myrbetriq ER 25mg once daily  Previous Medications: unknown     Clarifications to above regimen: None  Adverse Effects: None    Symptom Assessment  Not reviewed in detail, patient reports no recent concerns    Preventative Care  Immunizations Needed: All up-to-date and documented  Tobacco Use: non-smoker      Objective   Allergies[1]  Social History     Social History Narrative    Not on file      Medication Review  Current Outpatient Medications   Medication Instructions    amLODIPine (NORVASC) 5 mg, oral, Daily    atorvastatin (LIPITOR) 40 mg, oral, Daily    calcium carbonate 600 mg calcium (1,500 mg) tablet 1 tablet, Daily    cholecalciferol (Vitamin D3) 50 MCG (2000 UT) tablet Take 1 tablet (2,000 Units) by mouth once daily.    dapagliflozin propanediol (FARXIGA) 5 mg, oral, Daily    fluticasone (Flonase) 50 mcg/actuation nasal spray 2 sprays, Each Nostril, Daily, Shake gently. Before first use, prime pump. After use, clean tip and replace cap.    lisinopril 40 mg, oral, Daily    mirabegron (MYRBETRIQ) 25 mg, oral, Daily    multivit-iron-minerals-folic acid (Sentry Senior) 0.4 mg-300 mcg- 250 mcg tab 1 tablet, Daily    naproxen (NAPROSYN) 500 mg, oral, 2 times daily (morning and late afternoon)    triamcinolone (Kenalog) 0.1 % cream 2 times daily    walker misc Use as  "directed.      Vitals  BP Readings from Last 2 Encounters:   05/12/25 116/64   04/10/25 120/66     Wt Readings from Last 3 Encounters:   05/12/25 112 kg (246 lb 9.6 oz)   04/10/25 113 kg (250 lb 3.2 oz)   02/28/25 112 kg (246 lb)      There is no height or weight on file to calculate BMI.  Labs  A1C  Lab Results   Component Value Date    HGBA1C 5.8 (H) 03/31/2025     Metabolic Panel  Lab Results   Component Value Date    EGFR 50 (L) 03/31/2025    CREATININE 1.12 (H) 03/31/2025     03/31/2025    K 4.8 03/31/2025    CALCIUM 9.4 03/31/2025     03/31/2025    CO2 30 03/31/2025    BUN 26 (H) 03/31/2025     Liver function  Lab Results   Component Value Date    ALT 19 03/31/2025    AST 19 03/31/2025    ALKPHOS 49 03/31/2025    BILITOT 0.7 03/31/2025     Lipid Panel  Lab Results   Component Value Date    CHOL 140 03/31/2025    LDLCALC 61 03/31/2025    TRIG 125 03/31/2025    HDL 58 03/31/2025     Urine Microalbumin  No results found for: \"MICROALBCREA\"    Assessment/Plan   Problem List Items Addressed This Visit       CKD stage G3a/A1, GFR 45-59 and albumin creatinine ratio <30 mg/g (Multi)    Patient with CKD Stage 3a (eGFR 45-59).  Rationale for plan: Kidney function stable, no recent changes or concerns. Taking Farxiga 5mg for renal protection. Plan to continue current regimen and follow-up as scheduled with PCP.    Medication Changes:  CONTINUE  Farxiga 5mg once daily         Relevant Medications    dapagliflozin propanediol (Farxiga) 5 mg tablet    Bladder spasm    Patient's urinary symptoms reported as well-controlled.  Rationale for plan: Patient established on Myrbetriq with no recent changes or concerns. Due to symptoms well-controlled, plan to continue current regimen. Follow-up as scheduled with PCP.    Medication Changes:  CONTINUE  Myrbetriq ER 25mg once daily         Relevant Medications    mirabegron (Myrbetriq) 25 mg tablet extended release 24 hr     Patient Education:  Counseled patient on " relevant medication mechanisms of action, expectations, side effects, duration of therapy, contraindications, administration, and monitoring parameters.  All questions and concerns addressed. Contact pharmacist with any further questions or concerns prior to next appointment.     Patient Assistance Program (VAF)  Application for program to be submitted for the following medications: Farxiga, Myrbetriq  Patient verbally reports monthly or yearly income which is less than 400% federal poverty level for a household of 2.  Patient has active prescription insurance to which claims will be initially submitted.  Patient does NOT file taxes and will provide most recent [id number].  Financial information has been received.    Patient aware this process may take up to 2 weeks once financial documents have been received.  Patient has been counseled on relevant medication mechanisms of action, expectations, side effects, duration of therapy, contraindications, administration, and monitoring parameters. All patient questions have been answered.    If approved:  Medication(s) must be filled through [facility] hospital pharmacy for pickup or home delivery.  Prescriptions must be provided by a  clinical pharmacist following a clinical pharmacy visit to be eligible for program payment.  Medication claims will be billed to primary insurance and  Patient Assistance Program will pay the copay. This will result in a $0 copay for the patient.  Benefit is active for a duration of 365 days from the date of approval. The benefit may be canceled if the patient's insurance coverage changes, the prescription is discontinued, or the patient's address changes.    Clinical Pharmacist follow-up: Annually or as needed  Patient is not followed in [facility].    Thank you,  Bella Hamilton, [facility]  Clinical Pharmacy Specialist  [phone]    Continue all meds under the continuation of care with the referring provider and clinical pharmacy team.  Verbal  consent to manage patient's drug therapy was obtained from the patient. They were informed they may decline to participate or withdraw from participation in pharmacy services at any time.       [1]   Allergies  Allergen Reactions    Bactrim [Sulfamethoxazole-Trimethoprim] GI Upset    Cat Dander Unknown    Clarithromycin Unknown    Montelukast Unknown    Morphine Other     Nausea and vomiting

## 2025-06-06 NOTE — ASSESSMENT & PLAN NOTE
Patient's urinary symptoms reported as well-controlled.  Rationale for plan: Patient established on Myrbetriq with no recent changes or concerns. Due to symptoms well-controlled, plan to continue current regimen. Follow-up as scheduled with PCP.    Medication Changes:  CONTINUE  Myrbetriq ER 25mg once daily

## 2025-06-06 NOTE — ASSESSMENT & PLAN NOTE
PT to the ED for evaluation of pain to his R shin and achiles. PT was wrestling when he was taken down and the other boy landed on his leg. Patient with CKD Stage 3a (eGFR 45-59).  Rationale for plan: Kidney function stable, no recent changes or concerns. Taking Farxiga 5mg for renal protection. Plan to continue current regimen and follow-up as scheduled with PCP.    Medication Changes:  CONTINUE  Farxiga 5mg once daily

## 2025-06-07 ENCOUNTER — PHARMACY VISIT (OUTPATIENT)
Dept: PHARMACY | Facility: CLINIC | Age: 80
End: 2025-06-07
Payer: MEDICARE

## 2025-06-10 ENCOUNTER — OFFICE VISIT (OUTPATIENT)
Dept: PRIMARY CARE | Facility: CLINIC | Age: 80
End: 2025-06-10
Payer: COMMERCIAL

## 2025-06-10 VITALS
SYSTOLIC BLOOD PRESSURE: 129 MMHG | DIASTOLIC BLOOD PRESSURE: 76 MMHG | TEMPERATURE: 98.1 F | BODY MASS INDEX: 38.83 KG/M2 | HEIGHT: 66 IN | OXYGEN SATURATION: 95 % | WEIGHT: 241.6 LBS | RESPIRATION RATE: 16 BRPM | HEART RATE: 72 BPM

## 2025-06-10 DIAGNOSIS — R09.81 NASAL CONGESTION WITH RHINORRHEA: ICD-10-CM

## 2025-06-10 DIAGNOSIS — J34.89 NASAL CONGESTION WITH RHINORRHEA: ICD-10-CM

## 2025-06-10 DIAGNOSIS — R05.8 PRODUCTIVE COUGH: ICD-10-CM

## 2025-06-10 DIAGNOSIS — E66.812 CLASS 2 OBESITY WITH BODY MASS INDEX (BMI) OF 39.0 TO 39.9 IN ADULT, UNSPECIFIED OBESITY TYPE, UNSPECIFIED WHETHER SERIOUS COMORBIDITY PRESENT: ICD-10-CM

## 2025-06-10 DIAGNOSIS — J00 NASOPHARYNGITIS: Primary | ICD-10-CM

## 2025-06-10 DIAGNOSIS — I10 PRIMARY HYPERTENSION: ICD-10-CM

## 2025-06-10 PROCEDURE — 99213 OFFICE O/P EST LOW 20 MIN: CPT | Performed by: NURSE PRACTITIONER

## 2025-06-10 RX ORDER — AMOXICILLIN 875 MG/1
875 TABLET, COATED ORAL 2 TIMES DAILY
Qty: 14 TABLET | Refills: 0 | Status: SHIPPED | OUTPATIENT
Start: 2025-06-10 | End: 2025-06-17

## 2025-06-10 RX ORDER — BROMPHENIRAMINE MALEATE, PSEUDOEPHEDRINE HYDROCHLORIDE, AND DEXTROMETHORPHAN HYDROBROMIDE 2; 30; 10 MG/5ML; MG/5ML; MG/5ML
5 SYRUP ORAL 4 TIMES DAILY PRN
Qty: 120 ML | Refills: 1 | Status: SHIPPED | OUTPATIENT
Start: 2025-06-10 | End: 2025-06-20

## 2025-06-10 ASSESSMENT — PATIENT HEALTH QUESTIONNAIRE - PHQ9
2. FEELING DOWN, DEPRESSED OR HOPELESS: NOT AT ALL
1. LITTLE INTEREST OR PLEASURE IN DOING THINGS: NOT AT ALL
SUM OF ALL RESPONSES TO PHQ9 QUESTIONS 1 AND 2: 0
2. FEELING DOWN, DEPRESSED OR HOPELESS: NOT AT ALL
SUM OF ALL RESPONSES TO PHQ9 QUESTIONS 1 AND 2: 0
1. LITTLE INTEREST OR PLEASURE IN DOING THINGS: NOT AT ALL

## 2025-06-10 ASSESSMENT — PAIN SCALES - GENERAL: PAINLEVEL_OUTOF10: 0-NO PAIN

## 2025-06-10 ASSESSMENT — ENCOUNTER SYMPTOMS
LOSS OF SENSATION IN FEET: 0
DEPRESSION: 0
OCCASIONAL FEELINGS OF UNSTEADINESS: 0

## 2025-06-10 NOTE — PROGRESS NOTES
Subjective   Patient ID: Dora Guillermo is a 80 y.o. female who is with a chief complaint of symptoms of respiratory tract infection.     HPI   Patient is a 80 y.o. female who CONSULTED AT [facility] today. Patient is with symptoms of nasal congestion, mild nasal discharge, mild hoarseness, productive cough, intermittent wheezing, mild fatigue, and mild muscle ache. She denies having any headache, sinus pain, post nasal drip, sore throat, loss of sense of taste, loss of sense of smell, diarrhea, chills nor fever. Patient states that present condition started about 2 days ago after being exposed to granddaughter, daughter, and  who are also having similar symptoms as her. she denies shortness of breath, chest pain, palpitations, nor edema. she denies nausea, vomiting, abdominal pain, nor any other symptoms.    Patient states she had her COVID vaccine.  Patient states she had the flu shot for this season.    Review of Systems  General: no weight loss, generally healthy, (+) mild fatigue  Head:  no headaches / sinus pain, no vertigo, no injury  Eyes: no diplopia, no tearing, no pain,   Ears: no change in hearing, no tinnitus, no bleeding, no vertigo  Mouth:  no dental difficulties, no gingival bleeding, no sore throat, no loss of sense of taste, (+) mild hoarseness,   Nose: (+) congestion, (+) mild discharge, no bleeding, no obstruction, no loss of sense of smell, (+) sneezing  Neck: no stiffness, no pain, no tenderness, no masses, no bruit, Pulmonary: no dyspnea, (+) intermittent wheezing, no hemoptysis, (+) productive cough  Cardiovascular: no chest pain, no palpitations, no syncope, no orthopnea  Gastrointestinal: no change in appetite, no dysphagia, no abdominal pains, no diarrhea, no emesis, no melena  Genito Urinary: no dysuria, no urinary urgency, no nocturia, no incontinence, no change in nature of urine  Musculoskeletal: (+) mild muscle ache, no joint pain, no limitation of range  "of motion, no paresthesia, no numbness  Constitutional: no fever, no chills, no night sweats    Objective   /76 Comment: auto  Pulse 72   Temp 36.7 °C (98.1 °F)   Resp 16   Ht 1.676 m (5' 6\")   Wt 110 kg (241 lb 9.6 oz)   SpO2 95%   BMI 39.00 kg/m²     Physical Exam  General: ambulatory, in no acute distress  Head: normocephalic, no lesions, no sinus tenderness  Eyes: pink palpebral conjunctiva, anicteric sclerae, PERRLA, EOM's full  Ears: clear external auditory canals, no ear discharge, no bleeding from the ears, tympanic membrane intact  Nose: (+) congested nasal mucosa, (+) yellow mucoid nasal discharge, no bleeding, no obstruction  Throat: (+) erythema, and (+) exudate on posterior pharyngeal wall, no lesion  Neck: supple, no masses, no bruits, no CLADP  Chest: symmetrical chest expansion, no lagging, no retractions, clear breath sounds, no rales, no wheezes    Assessment/Plan   Problem List Items Addressed This Visit    None  Visit Diagnoses         Codes      Nasopharyngitis    -  Primary J00    Relevant Medications    amoxicillin (Amoxil) 875 mg tablet    brompheniramine-pseudoeph-DM 2-30-10 mg/5 mL syrup      Nasal congestion with rhinorrhea     R09.81, J34.89    Relevant Medications    amoxicillin (Amoxil) 875 mg tablet    brompheniramine-pseudoeph-DM 2-30-10 mg/5 mL syrup      Productive cough     R05.8    Relevant Medications    amoxicillin (Amoxil) 875 mg tablet    brompheniramine-pseudoeph-DM 2-30-10 mg/5 mL syrup      BMI 39.0-39.9,adult     Z68.39      Class 2 obesity with body mass index (BMI) of 39.0 to 39.9 in adult, unspecified obesity type, unspecified whether serious comorbidity present     E66.812, Z68.39        DISCHARGE SUMMARY:   Patient was seen and examined. Diagnosis, treatment, treatment options, and possible complications of today's illness discussed and explained to patient. Patient to take medication/s associated with this visit. Patient may also take OTC " analgesic/antipyretic if needed for pain/fever. Advised to increase oral fluid intake. Advised steam inhalation if needed to relieve congestion. Advised warm saline gargle if needed to relieve throat discomfort. Advised Listerine antiseptic mouthwash gargle TID. Patient may use Cepacol oral spray as needed to relieve throat discomfort. Patient was advised to discard the old toothbrush and use a new toothbrush beginning on the third of antibiotics. Advised to come back if with worsening or persistent symptoms. Patient verbalized understanding of plan of care.    Patient to come back in 7 - 10 days if needed for worsening symptoms.

## 2025-06-10 NOTE — PROGRESS NOTES
"Subjective   Patient ID: Dora Guillermo is a 80 y.o. female who presents for Cough.        Symptoms: cough,  sputum is yellow and thick, slight runny nose, congestion in chest, sneezing.  Length of symptoms: 2 days ago  OTC:  none hot tea with mild help.  Related information:    HPI     Review of Systems    Objective   /76 Comment: auto  Pulse 72   Temp 36.7 °C (98.1 °F)   Resp 16   Ht 1.676 m (5' 6\")   Wt 110 kg (241 lb 9.6 oz)   SpO2 95%   BMI 39.00 kg/m²     Physical Exam    Assessment/Plan          "

## 2025-06-11 RX ORDER — AMLODIPINE BESYLATE 5 MG/1
5 TABLET ORAL DAILY
Qty: 90 TABLET | Refills: 1 | Status: SHIPPED | OUTPATIENT
Start: 2025-06-11

## 2025-06-18 ENCOUNTER — HOSPITAL ENCOUNTER (INPATIENT)
Facility: HOSPITAL | Age: 80
LOS: 3 days | Discharge: HOME | DRG: 189 | End: 2025-06-21
Attending: STUDENT IN AN ORGANIZED HEALTH CARE EDUCATION/TRAINING PROGRAM | Admitting: STUDENT IN AN ORGANIZED HEALTH CARE EDUCATION/TRAINING PROGRAM
Payer: COMMERCIAL

## 2025-06-18 ENCOUNTER — APPOINTMENT (OUTPATIENT)
Dept: RADIOLOGY | Facility: HOSPITAL | Age: 80
DRG: 189 | End: 2025-06-18
Payer: COMMERCIAL

## 2025-06-18 ENCOUNTER — OFFICE VISIT (OUTPATIENT)
Dept: PRIMARY CARE | Facility: CLINIC | Age: 80
End: 2025-06-18
Payer: COMMERCIAL

## 2025-06-18 ENCOUNTER — APPOINTMENT (OUTPATIENT)
Dept: CARDIOLOGY | Facility: HOSPITAL | Age: 80
DRG: 189 | End: 2025-06-18
Payer: COMMERCIAL

## 2025-06-18 VITALS
WEIGHT: 242 LBS | RESPIRATION RATE: 21 BRPM | BODY MASS INDEX: 39.06 KG/M2 | SYSTOLIC BLOOD PRESSURE: 124 MMHG | OXYGEN SATURATION: 89 % | DIASTOLIC BLOOD PRESSURE: 78 MMHG | TEMPERATURE: 97.8 F | HEART RATE: 75 BPM

## 2025-06-18 DIAGNOSIS — J44.1 COPD EXACERBATION (MULTI): ICD-10-CM

## 2025-06-18 DIAGNOSIS — R06.02 SHORTNESS OF BREATH: ICD-10-CM

## 2025-06-18 DIAGNOSIS — J96.01 ACUTE HYPOXIC RESPIRATORY FAILURE: Primary | ICD-10-CM

## 2025-06-18 DIAGNOSIS — R06.2 DIFFUSE WHEEZING: ICD-10-CM

## 2025-06-18 DIAGNOSIS — J18.9 PNEUMONIA DUE TO INFECTIOUS ORGANISM, UNSPECIFIED LATERALITY, UNSPECIFIED PART OF LUNG: Primary | ICD-10-CM

## 2025-06-18 DIAGNOSIS — R09.89 RESPIRATORY CRACKLES OF BOTH LUNGS: ICD-10-CM

## 2025-06-18 LAB
ALBUMIN SERPL BCP-MCNC: 4.5 G/DL (ref 3.4–5)
ALP SERPL-CCNC: 48 U/L (ref 33–136)
ALT SERPL W P-5'-P-CCNC: 20 U/L (ref 7–45)
ANION GAP SERPL CALC-SCNC: 13 MMOL/L (ref 10–20)
AST SERPL W P-5'-P-CCNC: 29 U/L (ref 9–39)
ATRIAL RATE: 66 BPM
BASOPHILS # BLD AUTO: 0.05 X10*3/UL (ref 0–0.1)
BASOPHILS NFR BLD AUTO: 0.9 %
BILIRUB SERPL-MCNC: 0.6 MG/DL (ref 0–1.2)
BNP SERPL-MCNC: 15 PG/ML (ref 0–99)
BUN SERPL-MCNC: 20 MG/DL (ref 6–23)
CALCIUM SERPL-MCNC: 9.4 MG/DL (ref 8.6–10.3)
CARDIAC TROPONIN I PNL SERPL HS: 3 NG/L (ref 0–13)
CARDIAC TROPONIN I PNL SERPL HS: 3 NG/L (ref 0–13)
CHLORIDE SERPL-SCNC: 103 MMOL/L (ref 98–107)
CO2 SERPL-SCNC: 26 MMOL/L (ref 21–32)
CREAT SERPL-MCNC: 0.88 MG/DL (ref 0.5–1.05)
EGFRCR SERPLBLD CKD-EPI 2021: 67 ML/MIN/1.73M*2
EOSINOPHIL # BLD AUTO: 0.31 X10*3/UL (ref 0–0.4)
EOSINOPHIL NFR BLD AUTO: 5.4 %
ERYTHROCYTE [DISTWIDTH] IN BLOOD BY AUTOMATED COUNT: 14.4 % (ref 11.5–14.5)
FLUAV RNA RESP QL NAA+PROBE: NOT DETECTED
FLUAV RNA RESP QL NAA+PROBE: NOT DETECTED
FLUBV RNA RESP QL NAA+PROBE: NOT DETECTED
FLUBV RNA RESP QL NAA+PROBE: NOT DETECTED
GLUCOSE SERPL-MCNC: 105 MG/DL (ref 74–99)
HCT VFR BLD AUTO: 43.9 % (ref 36–46)
HGB BLD-MCNC: 14.1 G/DL (ref 12–16)
IMM GRANULOCYTES # BLD AUTO: 0.05 X10*3/UL (ref 0–0.5)
IMM GRANULOCYTES NFR BLD AUTO: 0.9 % (ref 0–0.9)
LYMPHOCYTES # BLD AUTO: 1.52 X10*3/UL (ref 0.8–3)
LYMPHOCYTES NFR BLD AUTO: 26.4 %
MCH RBC QN AUTO: 27.8 PG (ref 26–34)
MCHC RBC AUTO-ENTMCNC: 32.1 G/DL (ref 32–36)
MCV RBC AUTO: 86 FL (ref 80–100)
MONOCYTES # BLD AUTO: 0.33 X10*3/UL (ref 0.05–0.8)
MONOCYTES NFR BLD AUTO: 5.7 %
NEUTROPHILS # BLD AUTO: 3.5 X10*3/UL (ref 1.6–5.5)
NEUTROPHILS NFR BLD AUTO: 60.7 %
NRBC BLD-RTO: 0 /100 WBCS (ref 0–0)
P AXIS: 73 DEGREES
P OFFSET: 153 MS
P ONSET: 102 MS
PLATELET # BLD AUTO: 218 X10*3/UL (ref 150–450)
POTASSIUM SERPL-SCNC: 4.1 MMOL/L (ref 3.5–5.3)
PR INTERVAL: 222 MS
PROT SERPL-MCNC: 7.3 G/DL (ref 6.4–8.2)
Q ONSET: 213 MS
QRS COUNT: 11 BEATS
QRS DURATION: 86 MS
QT INTERVAL: 386 MS
QTC CALCULATION(BAZETT): 404 MS
QTC FREDERICIA: 398 MS
R AXIS: -38 DEGREES
RBC # BLD AUTO: 5.08 X10*6/UL (ref 4–5.2)
RSV RNA RESP QL NAA+PROBE: NOT DETECTED
SARS-COV-2 RNA RESP QL NAA+PROBE: NOT DETECTED
SARS-COV-2 RNA RESP QL NAA+PROBE: NOT DETECTED
SODIUM SERPL-SCNC: 138 MMOL/L (ref 136–145)
T AXIS: -10 DEGREES
T OFFSET: 406 MS
VENTRICULAR RATE: 66 BPM
WBC # BLD AUTO: 5.8 X10*3/UL (ref 4.4–11.3)

## 2025-06-18 PROCEDURE — 1200000002 HC GENERAL ROOM WITH TELEMETRY DAILY

## 2025-06-18 PROCEDURE — 96366 THER/PROPH/DIAG IV INF ADDON: CPT

## 2025-06-18 PROCEDURE — 2500000005 HC RX 250 GENERAL PHARMACY W/O HCPCS

## 2025-06-18 PROCEDURE — 83880 ASSAY OF NATRIURETIC PEPTIDE: CPT

## 2025-06-18 PROCEDURE — 71275 CT ANGIOGRAPHY CHEST: CPT

## 2025-06-18 PROCEDURE — 99214 OFFICE O/P EST MOD 30 MIN: CPT | Performed by: NURSE PRACTITIONER

## 2025-06-18 PROCEDURE — 87636 SARSCOV2 & INF A&B AMP PRB: CPT

## 2025-06-18 PROCEDURE — 71275 CT ANGIOGRAPHY CHEST: CPT | Mod: FOREIGN READ | Performed by: RADIOLOGY

## 2025-06-18 PROCEDURE — 96375 TX/PRO/DX INJ NEW DRUG ADDON: CPT

## 2025-06-18 PROCEDURE — 2550000001 HC RX 255 CONTRASTS: Performed by: STUDENT IN AN ORGANIZED HEALTH CARE EDUCATION/TRAINING PROGRAM

## 2025-06-18 PROCEDURE — 2500000002 HC RX 250 W HCPCS SELF ADMINISTERED DRUGS (ALT 637 FOR MEDICARE OP, ALT 636 FOR OP/ED): Performed by: EMERGENCY MEDICINE

## 2025-06-18 PROCEDURE — 94640 AIRWAY INHALATION TREATMENT: CPT

## 2025-06-18 PROCEDURE — 71045 X-RAY EXAM CHEST 1 VIEW: CPT | Performed by: RADIOLOGY

## 2025-06-18 PROCEDURE — 99223 1ST HOSP IP/OBS HIGH 75: CPT | Performed by: STUDENT IN AN ORGANIZED HEALTH CARE EDUCATION/TRAINING PROGRAM

## 2025-06-18 PROCEDURE — 36415 COLL VENOUS BLD VENIPUNCTURE: CPT

## 2025-06-18 PROCEDURE — 1159F MED LIST DOCD IN RCRD: CPT | Performed by: NURSE PRACTITIONER

## 2025-06-18 PROCEDURE — 2500000004 HC RX 250 GENERAL PHARMACY W/ HCPCS (ALT 636 FOR OP/ED): Mod: JZ

## 2025-06-18 PROCEDURE — 84484 ASSAY OF TROPONIN QUANT: CPT

## 2025-06-18 PROCEDURE — 85025 COMPLETE CBC W/AUTO DIFF WBC: CPT

## 2025-06-18 PROCEDURE — 99285 EMERGENCY DEPT VISIT HI MDM: CPT | Performed by: STUDENT IN AN ORGANIZED HEALTH CARE EDUCATION/TRAINING PROGRAM

## 2025-06-18 PROCEDURE — 2500000004 HC RX 250 GENERAL PHARMACY W/ HCPCS (ALT 636 FOR OP/ED)

## 2025-06-18 PROCEDURE — 2500000002 HC RX 250 W HCPCS SELF ADMINISTERED DRUGS (ALT 637 FOR MEDICARE OP, ALT 636 FOR OP/ED): Performed by: STUDENT IN AN ORGANIZED HEALTH CARE EDUCATION/TRAINING PROGRAM

## 2025-06-18 PROCEDURE — 71045 X-RAY EXAM CHEST 1 VIEW: CPT

## 2025-06-18 PROCEDURE — 87637 SARSCOV2&INF A&B&RSV AMP PRB: CPT | Performed by: STUDENT IN AN ORGANIZED HEALTH CARE EDUCATION/TRAINING PROGRAM

## 2025-06-18 PROCEDURE — 96368 THER/DIAG CONCURRENT INF: CPT

## 2025-06-18 PROCEDURE — 96365 THER/PROPH/DIAG IV INF INIT: CPT

## 2025-06-18 PROCEDURE — 93005 ELECTROCARDIOGRAM TRACING: CPT

## 2025-06-18 PROCEDURE — 3074F SYST BP LT 130 MM HG: CPT | Performed by: NURSE PRACTITIONER

## 2025-06-18 PROCEDURE — 87631 RESP VIRUS 3-5 TARGETS: CPT | Mod: STJLAB | Performed by: STUDENT IN AN ORGANIZED HEALTH CARE EDUCATION/TRAINING PROGRAM

## 2025-06-18 PROCEDURE — 2500000002 HC RX 250 W HCPCS SELF ADMINISTERED DRUGS (ALT 637 FOR MEDICARE OP, ALT 636 FOR OP/ED)

## 2025-06-18 PROCEDURE — 99285 EMERGENCY DEPT VISIT HI MDM: CPT | Mod: 25 | Performed by: STUDENT IN AN ORGANIZED HEALTH CARE EDUCATION/TRAINING PROGRAM

## 2025-06-18 PROCEDURE — 3078F DIAST BP <80 MM HG: CPT | Performed by: NURSE PRACTITIONER

## 2025-06-18 PROCEDURE — 80053 COMPREHEN METABOLIC PANEL: CPT

## 2025-06-18 RX ORDER — ENOXAPARIN SODIUM 100 MG/ML
40 INJECTION SUBCUTANEOUS EVERY 24 HOURS
Status: DISCONTINUED | OUTPATIENT
Start: 2025-06-19 | End: 2025-06-21 | Stop reason: HOSPADM

## 2025-06-18 RX ORDER — CEFTRIAXONE 2 G/50ML
2 INJECTION, SOLUTION INTRAVENOUS ONCE
Status: COMPLETED | OUTPATIENT
Start: 2025-06-18 | End: 2025-06-18

## 2025-06-18 RX ORDER — ALBUTEROL SULFATE 0.83 MG/ML
2.5 SOLUTION RESPIRATORY (INHALATION) ONCE
Status: DISCONTINUED | OUTPATIENT
Start: 2025-06-18 | End: 2025-06-18 | Stop reason: HOSPADM

## 2025-06-18 RX ORDER — PREDNISONE 20 MG/1
40 TABLET ORAL DAILY
Status: DISCONTINUED | OUTPATIENT
Start: 2025-06-19 | End: 2025-06-21 | Stop reason: HOSPADM

## 2025-06-18 RX ORDER — AMLODIPINE BESYLATE 5 MG/1
5 TABLET ORAL DAILY
Status: DISCONTINUED | OUTPATIENT
Start: 2025-06-19 | End: 2025-06-21 | Stop reason: HOSPADM

## 2025-06-18 RX ORDER — ATORVASTATIN CALCIUM 40 MG/1
40 TABLET, FILM COATED ORAL DAILY
Status: DISCONTINUED | OUTPATIENT
Start: 2025-06-19 | End: 2025-06-21 | Stop reason: HOSPADM

## 2025-06-18 RX ORDER — LISINOPRIL 40 MG/1
40 TABLET ORAL DAILY
Status: DISCONTINUED | OUTPATIENT
Start: 2025-06-19 | End: 2025-06-21 | Stop reason: HOSPADM

## 2025-06-18 RX ORDER — IPRATROPIUM BROMIDE AND ALBUTEROL SULFATE 2.5; .5 MG/3ML; MG/3ML
3 SOLUTION RESPIRATORY (INHALATION) ONCE
Status: COMPLETED | OUTPATIENT
Start: 2025-06-18 | End: 2025-06-18

## 2025-06-18 RX ORDER — DAPAGLIFLOZIN 10 MG/1
5 TABLET, FILM COATED ORAL DAILY
Status: DISCONTINUED | OUTPATIENT
Start: 2025-06-19 | End: 2025-06-21 | Stop reason: HOSPADM

## 2025-06-18 RX ORDER — IPRATROPIUM BROMIDE AND ALBUTEROL SULFATE 2.5; .5 MG/3ML; MG/3ML
3 SOLUTION RESPIRATORY (INHALATION)
Status: DISCONTINUED | OUTPATIENT
Start: 2025-06-18 | End: 2025-06-21 | Stop reason: HOSPADM

## 2025-06-18 RX ORDER — IPRATROPIUM BROMIDE AND ALBUTEROL SULFATE 2.5; .5 MG/3ML; MG/3ML
3 SOLUTION RESPIRATORY (INHALATION) EVERY 4 HOURS PRN
Status: DISCONTINUED | OUTPATIENT
Start: 2025-06-18 | End: 2025-06-21 | Stop reason: HOSPADM

## 2025-06-18 RX ORDER — OXYBUTYNIN CHLORIDE 5 MG/1
5 TABLET ORAL 2 TIMES DAILY
Status: DISCONTINUED | OUTPATIENT
Start: 2025-06-18 | End: 2025-06-21 | Stop reason: HOSPADM

## 2025-06-18 RX ADMIN — CEFTRIAXONE 2 G: 2 INJECTION, SOLUTION INTRAVENOUS at 19:25

## 2025-06-18 RX ADMIN — ALBUTEROL SULFATE 2.5 MG: 0.83 SOLUTION RESPIRATORY (INHALATION) at 15:51

## 2025-06-18 RX ADMIN — Medication 2 L/MIN: at 14:31

## 2025-06-18 RX ADMIN — IPRATROPIUM BROMIDE AND ALBUTEROL SULFATE 3 ML: 2.5; .5 SOLUTION RESPIRATORY (INHALATION) at 14:29

## 2025-06-18 RX ADMIN — OXYBUTYNIN CHLORIDE 5 MG: 5 TABLET ORAL at 23:00

## 2025-06-18 RX ADMIN — DOXYCYCLINE 100 MG: 100 INJECTION, POWDER, LYOPHILIZED, FOR SOLUTION INTRAVENOUS at 19:26

## 2025-06-18 RX ADMIN — IPRATROPIUM BROMIDE AND ALBUTEROL SULFATE 3 ML: 2.5; .5 SOLUTION RESPIRATORY (INHALATION) at 21:59

## 2025-06-18 RX ADMIN — METHYLPREDNISOLONE SODIUM SUCCINATE 125 MG: 125 INJECTION, POWDER, FOR SOLUTION INTRAMUSCULAR; INTRAVENOUS at 20:03

## 2025-06-18 RX ADMIN — Medication 4 L/MIN: at 21:59

## 2025-06-18 RX ADMIN — IPRATROPIUM BROMIDE AND ALBUTEROL SULFATE 3 ML: 2.5; .5 SOLUTION RESPIRATORY (INHALATION) at 19:57

## 2025-06-18 RX ADMIN — Medication 6 L/MIN: at 19:50

## 2025-06-18 RX ADMIN — Medication 5 L/MIN: at 19:57

## 2025-06-18 RX ADMIN — IOHEXOL 60 ML: 350 INJECTION, SOLUTION INTRAVENOUS at 18:13

## 2025-06-18 SDOH — ECONOMIC STABILITY: HOUSING INSECURITY: IN THE PAST 12 MONTHS, HOW MANY TIMES HAVE YOU MOVED WHERE YOU WERE LIVING?: 0

## 2025-06-18 SDOH — ECONOMIC STABILITY: FOOD INSECURITY: WITHIN THE PAST 12 MONTHS, YOU WORRIED THAT YOUR FOOD WOULD RUN OUT BEFORE YOU GOT THE MONEY TO BUY MORE.: NEVER TRUE

## 2025-06-18 SDOH — SOCIAL STABILITY: SOCIAL INSECURITY: DOES ANYONE TRY TO KEEP YOU FROM HAVING/CONTACTING OTHER FRIENDS OR DOING THINGS OUTSIDE YOUR HOME?: NO

## 2025-06-18 SDOH — ECONOMIC STABILITY: INCOME INSECURITY: IN THE PAST 12 MONTHS HAS THE ELECTRIC, GAS, OIL, OR WATER COMPANY THREATENED TO SHUT OFF SERVICES IN YOUR HOME?: NO

## 2025-06-18 SDOH — HEALTH STABILITY: MENTAL HEALTH: HOW MANY DRINKS CONTAINING ALCOHOL DO YOU HAVE ON A TYPICAL DAY WHEN YOU ARE DRINKING?: PATIENT DOES NOT DRINK

## 2025-06-18 SDOH — SOCIAL STABILITY: SOCIAL INSECURITY: WITHIN THE LAST YEAR, HAVE YOU BEEN AFRAID OF YOUR PARTNER OR EX-PARTNER?: NO

## 2025-06-18 SDOH — SOCIAL STABILITY: SOCIAL INSECURITY
WITHIN THE LAST YEAR, HAVE YOU BEEN KICKED, HIT, SLAPPED, OR OTHERWISE PHYSICALLY HURT BY YOUR PARTNER OR EX-PARTNER?: NO

## 2025-06-18 SDOH — ECONOMIC STABILITY: HOUSING INSECURITY: AT ANY TIME IN THE PAST 12 MONTHS, WERE YOU HOMELESS OR LIVING IN A SHELTER (INCLUDING NOW)?: NO

## 2025-06-18 SDOH — SOCIAL STABILITY: SOCIAL INSECURITY: HAS ANYONE EVER THREATENED TO HURT YOUR FAMILY OR YOUR PETS?: NO

## 2025-06-18 SDOH — HEALTH STABILITY: MENTAL HEALTH: HOW OFTEN DO YOU HAVE SIX OR MORE DRINKS ON ONE OCCASION?: NEVER

## 2025-06-18 SDOH — ECONOMIC STABILITY: HOUSING INSECURITY: IN THE LAST 12 MONTHS, WAS THERE A TIME WHEN YOU WERE NOT ABLE TO PAY THE MORTGAGE OR RENT ON TIME?: NO

## 2025-06-18 SDOH — SOCIAL STABILITY: SOCIAL INSECURITY: WITHIN THE LAST YEAR, HAVE YOU BEEN HUMILIATED OR EMOTIONALLY ABUSED IN OTHER WAYS BY YOUR PARTNER OR EX-PARTNER?: NO

## 2025-06-18 SDOH — ECONOMIC STABILITY: FOOD INSECURITY: WITHIN THE PAST 12 MONTHS, THE FOOD YOU BOUGHT JUST DIDN'T LAST AND YOU DIDN'T HAVE MONEY TO GET MORE.: NEVER TRUE

## 2025-06-18 SDOH — SOCIAL STABILITY: SOCIAL INSECURITY: HAVE YOU HAD THOUGHTS OF HARMING ANYONE ELSE?: NO

## 2025-06-18 SDOH — SOCIAL STABILITY: SOCIAL INSECURITY: ABUSE: ADULT

## 2025-06-18 SDOH — ECONOMIC STABILITY: FOOD INSECURITY: HOW HARD IS IT FOR YOU TO PAY FOR THE VERY BASICS LIKE FOOD, HOUSING, MEDICAL CARE, AND HEATING?: NOT VERY HARD

## 2025-06-18 SDOH — HEALTH STABILITY: PHYSICAL HEALTH
HOW OFTEN DO YOU NEED TO HAVE SOMEONE HELP YOU WHEN YOU READ INSTRUCTIONS, PAMPHLETS, OR OTHER WRITTEN MATERIAL FROM YOUR DOCTOR OR PHARMACY?: NEVER

## 2025-06-18 SDOH — SOCIAL STABILITY: SOCIAL INSECURITY: ARE THERE ANY APPARENT SIGNS OF INJURIES/BEHAVIORS THAT COULD BE RELATED TO ABUSE/NEGLECT?: NO

## 2025-06-18 SDOH — HEALTH STABILITY: MENTAL HEALTH: HOW OFTEN DO YOU HAVE A DRINK CONTAINING ALCOHOL?: NEVER

## 2025-06-18 SDOH — SOCIAL STABILITY: SOCIAL INSECURITY
WITHIN THE LAST YEAR, HAVE YOU BEEN RAPED OR FORCED TO HAVE ANY KIND OF SEXUAL ACTIVITY BY YOUR PARTNER OR EX-PARTNER?: NO

## 2025-06-18 SDOH — SOCIAL STABILITY: SOCIAL INSECURITY: DO YOU FEEL UNSAFE GOING BACK TO THE PLACE WHERE YOU ARE LIVING?: NO

## 2025-06-18 SDOH — ECONOMIC STABILITY: TRANSPORTATION INSECURITY: IN THE PAST 12 MONTHS, HAS LACK OF TRANSPORTATION KEPT YOU FROM MEDICAL APPOINTMENTS OR FROM GETTING MEDICATIONS?: NO

## 2025-06-18 SDOH — SOCIAL STABILITY: SOCIAL INSECURITY: ARE YOU OR HAVE YOU BEEN THREATENED OR ABUSED PHYSICALLY, EMOTIONALLY, OR SEXUALLY BY ANYONE?: NO

## 2025-06-18 SDOH — SOCIAL STABILITY: SOCIAL INSECURITY: HAVE YOU HAD ANY THOUGHTS OF HARMING ANYONE ELSE?: NO

## 2025-06-18 SDOH — SOCIAL STABILITY: SOCIAL INSECURITY: DO YOU FEEL ANYONE HAS EXPLOITED OR TAKEN ADVANTAGE OF YOU FINANCIALLY OR OF YOUR PERSONAL PROPERTY?: NO

## 2025-06-18 SDOH — SOCIAL STABILITY: SOCIAL INSECURITY: WERE YOU ABLE TO COMPLETE ALL THE BEHAVIORAL HEALTH SCREENINGS?: YES

## 2025-06-18 ASSESSMENT — ACTIVITIES OF DAILY LIVING (ADL)
BATHING: INDEPENDENT
HEARING - LEFT EAR: FUNCTIONAL
PATIENT'S MEMORY ADEQUATE TO SAFELY COMPLETE DAILY ACTIVITIES?: YES
DRESSING YOURSELF: INDEPENDENT
TOILETING: INDEPENDENT
LACK_OF_TRANSPORTATION: NO
LACK_OF_TRANSPORTATION: NO
HEARING - RIGHT EAR: FUNCTIONAL
ASSISTIVE_DEVICE: OTHER (COMMENT);EYEGLASSES
ADEQUATE_TO_COMPLETE_ADL: YES
GROOMING: INDEPENDENT
JUDGMENT_ADEQUATE_SAFELY_COMPLETE_DAILY_ACTIVITIES: YES
FEEDING YOURSELF: INDEPENDENT
WALKS IN HOME: INDEPENDENT

## 2025-06-18 ASSESSMENT — LIFESTYLE VARIABLES
PRESCIPTION_ABUSE_PAST_12_MONTHS: NO
TOTAL SCORE: 0
SKIP TO QUESTIONS 9-10: 1
HAVE PEOPLE ANNOYED YOU BY CRITICIZING YOUR DRINKING: NO
AUDIT-C TOTAL SCORE: 0
AUDIT-C TOTAL SCORE: 0
SUBSTANCE_ABUSE_PAST_12_MONTHS: NO
HOW MANY STANDARD DRINKS CONTAINING ALCOHOL DO YOU HAVE ON A TYPICAL DAY: PATIENT DOES NOT DRINK
HAVE YOU EVER FELT YOU SHOULD CUT DOWN ON YOUR DRINKING: NO
HOW OFTEN DO YOU HAVE A DRINK CONTAINING ALCOHOL: NEVER
EVER HAD A DRINK FIRST THING IN THE MORNING TO STEADY YOUR NERVES TO GET RID OF A HANGOVER: NO
EVER FELT BAD OR GUILTY ABOUT YOUR DRINKING: NO
SKIP TO QUESTIONS 9-10: 1
HOW OFTEN DO YOU HAVE 6 OR MORE DRINKS ON ONE OCCASION: NEVER
AUDIT-C TOTAL SCORE: 0

## 2025-06-18 ASSESSMENT — COGNITIVE AND FUNCTIONAL STATUS - GENERAL
PATIENT BASELINE BEDBOUND: NO
DAILY ACTIVITIY SCORE: 24
STANDING UP FROM CHAIR USING ARMS: A LITTLE
MOVING TO AND FROM BED TO CHAIR: A LITTLE
WALKING IN HOSPITAL ROOM: A LITTLE
CLIMB 3 TO 5 STEPS WITH RAILING: A LITTLE
MOBILITY SCORE: 20

## 2025-06-18 ASSESSMENT — ENCOUNTER SYMPTOMS
FATIGUE: 1
PALPITATIONS: 0
COUGH: 1
TROUBLE SWALLOWING: 0
SLEEP DISTURBANCE: 1
STRIDOR: 0
CHEST TIGHTNESS: 1
WHEEZING: 1
SHORTNESS OF BREATH: 1

## 2025-06-18 ASSESSMENT — PATIENT HEALTH QUESTIONNAIRE - PHQ9
2. FEELING DOWN, DEPRESSED OR HOPELESS: NOT AT ALL
1. LITTLE INTEREST OR PLEASURE IN DOING THINGS: NOT AT ALL
SUM OF ALL RESPONSES TO PHQ9 QUESTIONS 1 & 2: 0

## 2025-06-18 ASSESSMENT — PAIN SCALES - GENERAL
PAINLEVEL_OUTOF10: 0 - NO PAIN
PAINLEVEL_OUTOF10: 0 - NO PAIN

## 2025-06-18 ASSESSMENT — COPD QUESTIONNAIRES: COPD: 0

## 2025-06-18 ASSESSMENT — PAIN - FUNCTIONAL ASSESSMENT: PAIN_FUNCTIONAL_ASSESSMENT: 0-10

## 2025-06-18 NOTE — ED PROVIDER NOTES
EMERGENCY DEPARTMENT ENCOUNTER      Pt Name: Dora Guillermo  MRN: [id number]  Birthdate 1945  Date of evaluation: 6/18/2025  Provider: Charity Nelson MD    CHIEF COMPLAINT       Chief Complaint   Patient presents with    Shortness of Breath     Flu-like S/s x1 week. Chills yesterday, no fever. Productive cough. Couldn't catch breath.      HISTORY OF PRESENT ILLNESS    Dora Guillermo is a 80 y.o. year old female who presents to the ER for shortness of breath and flulike symptoms for the past 7 days.  The patient reports last night, her pulse ox measured her saturation at 85%, she was at her PCPs office today and it was in the low 90s, she was given a DuoNeb treatment which improved her pulse ox slightly.  While en route to the emergency department, she was given an additional DuoNeb treatment and albuterol from EMS.  The patient reports chills earlier today but has not measured a fever.  The patient reports that she has been producing a productive cough with white sputum.  The patient was recently diagnosed with nasopharyngitis, she was given a prescription for amoxicillin to take for 7 days.  She finished her prescription this morning.  The patient reports that her granddaughter and her daughter has been sick with similar symptoms.    PMH is significant for nasopharyngitis, hypertension, hyperlipidemia, CKD stage III  PAST MEDICAL HISTORY   Medical History[1]  CURRENT MEDICATIONS       Current Discharge Medication List        CONTINUE these medications which have NOT CHANGED    Details   amLODIPine (Norvasc) 5 mg tablet TAKE 1 TABLET DAILY  Qty: 90 tablet, Refills: 1    Associated Diagnoses: Primary hypertension      atorvastatin (Lipitor) 40 mg tablet TAKE 1 TABLET ONCE DAILY  Qty: 90 tablet, Refills: 1    Associated Diagnoses: Mixed hyperlipidemia      calcium carbonate 600 mg calcium (1,500 mg) tablet Take 1 tablet (1,500 mg) by mouth once daily.      cholecalciferol (Vitamin D3) 50 MCG (2000 UT) tablet Take 1  tablet (2,000 Units) by mouth once daily.      dapagliflozin propanediol (Farxiga) 5 mg tablet Take 1 tablet (5 mg) by mouth once daily.  Qty: 90 tablet, Refills: 3    Comments: [facility]; MIRYAM PAP; patient consents to auto fill; please mail to the patient; contact jenny becker with questions  Associated Diagnoses: CKD stage G3a/A1, GFR 45-59 and albumin creatinine ratio <30 mg/g (Multi)      lisinopril 40 mg tablet TAKE 1 TABLET DAILY  Qty: 90 tablet, Refills: 1    Associated Diagnoses: Hypertension, unspecified type      mirabegron (Myrbetriq) 25 mg tablet extended release 24 hr Take 1 tablet (25 mg) by mouth once daily.  Qty: 90 tablet, Refills: 3    Comments: WE[facility]; MIRYAM PAP; patient consents to auto fill; please mail to the patient; contact jenny becker with questions  Associated Diagnoses: Bladder spasm      multivit-iron-minerals-folic acid (Sentry Senior) 0.4 mg-300 mcg- 250 mcg tab Take 1 tablet by mouth once daily.      naproxen (Naprosyn) 250 mg tablet Take 2 tablets (500 mg) by mouth 2 times daily (morning and late afternoon).    Associated Diagnoses: Primary osteoarthritis of right knee; Osteoarthritis of spine with radiculopathy, lumbar region      fluticasone (Flonase) 50 mcg/actuation nasal spray Administer 2 sprays into each nostril once daily. Shake gently. Before first use, prime pump. After use, clean tip and replace cap.  Qty: 16 g, Refills: 2    Associated Diagnoses: Sinusitis, unspecified chronicity, unspecified location; Allergic rhinitis, unspecified seasonality, unspecified trigger      walker misc Use as directed.  Qty: 1 each, Refills: 0    Associated Diagnoses: Primary osteoarthritis of right knee; History of total right knee replacement; Osteoarthritis of spine with radiculopathy, lumbar region           SURGICAL HISTORY     Surgical History[2]  ALLERGIES     Clarithromycin, Montelukast, Bactrim [sulfamethoxazole-trimethoprim], Cat dander, and Morphine  FAMILY HISTORY     Family  History[3]  SOCIAL HISTORY     Social History[4]  PHYSICAL EXAM  (up to 7 for level 4, 8 or more for level 5)     ED Triage Vitals [06/18/25 1356]   Temperature Heart Rate Respirations BP   36.1 °C (97 °F) 71 (!) 22 153/72      Pulse Ox Temp Source Heart Rate Source Patient Position   (!) 93 % Temporal -- Sitting      BP Location FiO2 (%)     Right arm --       Physical Exam  Constitutional:       Appearance: She is obese.   HENT:      Head: Normocephalic and atraumatic.      Mouth/Throat:      Mouth: Mucous membranes are moist.   Cardiovascular:      Rate and Rhythm: Normal rate.      Heart sounds: Murmur heard.   Pulmonary:      Breath sounds: Decreased breath sounds, wheezing and rales present.   Abdominal:      General: Bowel sounds are normal.      Palpations: Abdomen is soft. There is no mass.      Tenderness: There is no abdominal tenderness. There is no guarding or rebound.   Musculoskeletal:         General: Normal range of motion.      Cervical back: Normal range of motion and neck supple.   Skin:     General: Skin is warm and dry.      Capillary Refill: Capillary refill takes less than 2 seconds.   Neurological:      Mental Status: She is alert.        DIAGNOSTIC RESULTS   LABS:  Labs Reviewed   COMPREHENSIVE METABOLIC PANEL - Abnormal       Result Value    Glucose 105 (*)     Sodium 138      Potassium 4.1      Chloride 103      Bicarbonate 26      Anion Gap 13      Urea Nitrogen 20      Creatinine 0.88      eGFR 67      Calcium 9.4      Albumin 4.5      Alkaline Phosphatase 48      Total Protein 7.3      AST 29      Bilirubin, Total 0.6      ALT 20     BASIC METABOLIC PANEL - Abnormal    Glucose 216 (*)     Sodium 137      Potassium 3.6      Chloride 103      Bicarbonate 24      Anion Gap 14      Urea Nitrogen 23      Creatinine 1.06 (*)     eGFR 54 (*)     Calcium 9.4     B-TYPE NATRIURETIC PEPTIDE - Normal    BNP 15      Narrative:        <100 pg/mL - Heart failure unlikely  100-299 pg/mL -  Intermediate probability of acute heart                  failure exacerbation. Correlate with clinical                  context and patient history.    >=300 pg/mL - Heart Failure likely. Correlate with clinical                  context and patient history.    BNP testing is performed using different testing methodology at [facility] than at other [facility] hospitals. Direct result comparisons should only be made within the same method.      SARS-COV-2 AND INFLUENZA A/B PCR - Normal    Flu A Result Not Detected      Flu B Result Not Detected      Coronavirus 2019, PCR Not Detected      Narrative:     This assay is an FDA-cleared, in vitro diagnostic nucleic acid amplification test for the qualitative detection and differentiation of SARS CoV-2/ Influenza A/B from nasopharyngeal specimens collected from individuals with signs and symptoms of respiratory tract infections, and has been validated for use at [facility]. Negative results do not preclude COVID-19/ Influenza A/B infections and should not be used as the sole basis for diagnosis, treatment, or other management decisions. Testing for SARS CoV-2 is recommended only for patients who meet current clinical and/or epidemiological criteria defined by federal, state, or local public health directives.   SERIAL TROPONIN-INITIAL - Normal    Troponin I, High Sensitivity 3      Narrative:     Less than 99th percentile of normal range cutoff-  Female and children under 18 years old <14 ng/L; Male <21 ng/L: Negative  Repeat testing should be performed if clinically indicated.     Female and children under 18 years old 14-50 ng/L; Male 21-50 ng/L:  Consistent with possible cardiac damage and possible increased clinical   risk. Serial measurements may help to assess extent of myocardial damage.     >50 ng/L: Consistent with cardiac damage, increased clinical risk and  myocardial infarction. Serial measurements may help assess extent of    myocardial damage.      NOTE: Children less than 1 year old may have higher baseline troponin   levels and results should be interpreted in conjunction with the overall   clinical context.     NOTE: Troponin I testing is performed using a different   testing methodology at [facility] than at other   [facility]. Direct result comparisons should only   be made within the same method.   SERIAL TROPONIN, 1 HOUR - Normal    Troponin I, High Sensitivity 3      Narrative:     Less than 99th percentile of normal range cutoff-  Female and children under 18 years old <14 ng/L; Male <21 ng/L: Negative  Repeat testing should be performed if clinically indicated.     Female and children under 18 years old 14-50 ng/L; Male 21-50 ng/L:  Consistent with possible cardiac damage and possible increased clinical   risk. Serial measurements may help to assess extent of myocardial damage.     >50 ng/L: Consistent with cardiac damage, increased clinical risk and  myocardial infarction. Serial measurements may help assess extent of   myocardial damage.      NOTE: Children less than 1 year old may have higher baseline troponin   levels and results should be interpreted in conjunction with the overall   clinical context.     NOTE: Troponin I testing is performed using a different   testing methodology at [facility] than at other   [facility]. Direct result comparisons should only   be made within the same method.   SARS-COV-2, INFLUENZA A/B AND RSV PCR - Normal    Coronavirus 2019, PCR Not Detected      Flu A Result Not Detected      Flu B Result Not Detected      RSV PCR Not Detected      Narrative:     This assay is an FDA-cleared, in vitro diagnostic nucleic acid amplification test for the qualitative detection and differentiation of SARS CoV-2/ Influenza A/B/ RSV from nasopharyngeal specimens collected from individuals with signs and symptoms of respiratory tract infections, and has been  validated for use at [facility]. Negative results do not preclude COVID-19/ Influenza A/B/ RSV infections and should not be used as the sole basis for diagnosis, treatment, or other management decisions. Testing for SARS CoV-2 is recommended only for patients who meet current clinical and/or epidemiological criteria defined by federal, state, or local public health directives.   PARAINFLUENZAE  PCR - Normal    Parainfluenza 1, PCR Not Detected      Parainfluenza 2, PCR Not Detected      Parainfluenza 3, PCR Not Detected      Parainfluenza 4, PCR Not Detected      Narrative:     This assay is an FDA-cleared multiplex real-time PCR (RT-PCR) in vitro diagnostic test for the qualitative detection and differentiation of Parainfluenza virus 1, 2 3 & 4, from nasopharyngeal swab specimens in symptomatic patients. Negative results do not preclude Parainfluenza infections and should not be used as the sole basis for treatment or other management decisions.     ADENOVIRUS PCR QUAL FOR RESPIRATORY SAMPLES - Normal    Adenovirus PCR, Qual Not Detected      Narrative:     This assay is an FDA-cleared multiplex real-time PCR (RT-PCR) in vitro diagnostic test for the qualitative detection and differentiation of Adenovirus (AdV), human Metapneumovirus (hMPV), and Rhinovirus (RV), from nasopharyngeal swab specimens in symptomatic patients. Negative results do not preclude Adenovirus infections and should not be used as the sole basis for treatment or other management decisions.   RHINOVIRUS PCR, RESPIRATORY SPECIMENS - Normal    Rhinovirus PCR, Respiratory Spec Not Detected      Narrative:     This assay is an FDA-cleared multiplex real-time PCR (RT-PCR) in vitro diagnostic test for the qualitative detection and differentiation of Adenovirus (AdV), human Metapneumovirus (hMPV), and Rhinovirus (RV), from nasopharyngeal swab specimens in symptomatic patients. Negative results do not preclude Rhinovirus  infections and should not be used as the sole basis for treatment or other management decisions.   METAPNEUMOVIRUS PCR - Normal    Metapneumovirus (Human), PCR Not Detected      Narrative:     This assay is an FDA-cleared multiplex real-time PCR (RT-PCR) in vitro diagnostic test for the qualitative detection and differentiation of Adenovirus (AdV), human Metapneumovirus (hMPV), and Rhinovirus (RV), from nasopharyngeal swab specimens in symptomatic patients. Negative results do not preclude human Metapneumovirus infections and should not be used as the sole basis for treatment or other management decisions   CBC - Normal    WBC 8.7      nRBC 0.0      RBC 4.98      Hemoglobin 13.9      Hematocrit 43.3      MCV 87      MCH 27.9      MCHC 32.1      RDW 14.4      Platelets 239     RESPIRATORY CULTURE/SMEAR   CBC WITH AUTO DIFFERENTIAL    WBC 5.8      nRBC 0.0      RBC 5.08      Hemoglobin 14.1      Hematocrit 43.9      MCV 86      MCH 27.8      MCHC 32.1      RDW 14.4      Platelets 218      Neutrophils % 60.7      Immature Granulocytes %, Automated 0.9      Lymphocytes % 26.4      Monocytes % 5.7      Eosinophils % 5.4      Basophils % 0.9      Neutrophils Absolute 3.50      Immature Granulocytes Absolute, Automated 0.05      Lymphocytes Absolute 1.52      Monocytes Absolute 0.33      Eosinophils Absolute 0.31      Basophils Absolute 0.05     TROPONIN SERIES- (INITIAL, 1 HR)    Narrative:     The following orders were created for panel order Troponin I Series, High Sensitivity (0, 1 HR).  Procedure                               Abnormality         Status                     ---------                               -----------         ------                     Troponin I, High Sensiti...[[id number]]  Normal              Final result               Troponin, High Sensitivi...[[id number]]  Normal              Final result                 Please view results for these tests on the individual orders.   BASIC METABOLIC PANEL      All other labs were within normal range or not returned as of this dictation.  Imaging  CT angio chest for pulmonary embolism   Final Result   No pulmonary artery emboli.   Mild basilar atelectasis.  No other acute cardiopulmonary disease.   Prior granulomatous process.   Signed by Yasir Cruz DO      XR chest 1 view   Final Result   Several discoid areas of atelectasis in each lower lung zone.        Signed by: Umu Wilson 6/18/2025 2:37 PM   Dictation workstation:   [id number]         Procedure  Procedures  EMERGENCY DEPARTMENT COURSE/MDM:   Medical Decision Making    Vitals:    Vitals:    06/19/25 0800 06/19/25 0908 06/19/25 0946 06/19/25 1414   BP: 130/70      BP Location: Left arm      Patient Position: Lying      Pulse: 94      Resp: 18      Temp: 36.6 °C (97.9 °F)      TempSrc: Temporal      SpO2: 93% 92% 96% 92%   Weight:       Height:         Dora Guillermo is a female 80 y.o. who presents to the ER for shortness of breath. On arrival the patients vital signs were: Afebrile, Reguar HR, Normotensive, Tachypneic, and Normoxic on supplemental oxygen. History obtained from: patient.     Physical exam significant for bilateral wheezing in the lungs, diminished breath sounds bilaterally, rales and crackles present bilaterally in the lower lung bases.  Systolic murmur present on auscultation of the heart.  The heart is in regular rate and rhythm.  The abdomen is soft and nontender, no masses or hernias.  No pitting edema in the lower extremities.    EKG independently interpreted by myself is significant for regular rate and rhythm, prolonged VA interval, left axis deviation, no STEMI.    On independent assessment of the labs, CBC was within normal limits, no evidence of infection or acute anemia.  CMP was within normal limits, no evidence of significant electrolyte abnormalities, MARCY, or liver dysfunction.  Troponin was negative on initial and on repeat.  BNP is 15.  Viral panel was negative.    Chest  x-ray significant for areas of atelectasis, concerning for multifocal pneumonia due to the patient's presentation.    The patient was placed on supplemental oxygen.  On ambulation, her pulse ox dropped significantly.  On reassessment, the patient still has crackles and rales, wheezing has diminished after the DuoNeb treatments.    Patient was signed out to Dr. Anderson at 7 PM pending completion of their work-up.  Please see the next provider's transition of care note for the remainder of the patient's care.      ED Course as of 06/19/25 1600   Wed Jun 18, 2025   1618 WBC: 5.8 [GR]   1909 Signed out to me from Dr. Nelson at 7 PM, pending read of CT angio pulmonary embolism to see evidence of pulmonary emboli.  Patient has new oxygen requirement and needs to be admitted. [NAVA]   1957 Called to the room for low pulse ox, patient appeared to have a pulse ox at 86% while on 5 L was increased to 7 L, however fingers were cold to palpation at the tips therefore the pulse ox probe was placed in the ear with a 98% pulse ox therefore oxygen levels dropped back down to 5.  Patient states she feels better.  Will do a DuoNeb treatment, and start steroids as well.  At this time she is maintaining 97 to 98% on 5 L nasal cannula with the ear probe.  Waveform appears adequate. [GR]      ED Course User Index  [GR] Danish Duffy DO  [NAVA] Crow Anderson DO         Diagnoses as of 06/19/25 1600   Pneumonia due to infectious organism, unspecified laterality, unspecified part of lung   Shortness of breath         External Records Reviewed: I reviewed recent and relevant outside records including inpatient notes, outpatient records      Shared decision making for disposition  Patient and/or patient´s representative was counseled regarding labs, imaging, likely diagnosis. All questions were answered. Recommendation was made   for Admission given the need for further escalation of care to inpatient management. Patient agreed and was  admitted in stable condition. Admitting team was notified of any pending labs or imaging to ensure continuity of care.     ED Medications administered this visit:    Medications   oxygen (O2) therapy (2 L/min inhalation Rate Change Medical Gas 6/19/25 0908)   amLODIPine (Norvasc) tablet 5 mg (5 mg oral Given 6/19/25 0901)   atorvastatin (Lipitor) tablet 40 mg (40 mg oral Given 6/19/25 0901)   dapagliflozin propanediol (Farxiga) tablet 5 mg ( oral Dose Auto Held 6/23/25 0900)   lisinopril tablet 40 mg ( oral Dose Auto Held 6/23/25 0900)   oxyBUTYnin (Ditropan) tablet 5 mg (5 mg oral Given 6/19/25 0901)   predniSONE (Deltasone) tablet 40 mg (40 mg oral Given 6/19/25 0901)   ipratropium-albuteroL (Duo-Neb) 0.5-2.5 mg/3 mL nebulizer solution 3 mL (3 mL nebulization Given 6/19/25 1414)   ipratropium-albuteroL (Duo-Neb) 0.5-2.5 mg/3 mL nebulizer solution 3 mL (has no administration in time range)   enoxaparin (Lovenox) syringe 40 mg (40 mg subcutaneous Given 6/19/25 0901)   sodium chloride 0.9% infusion (50 mL/hr intravenous New Bag 6/19/25 0902)   ipratropium-albuteroL (Duo-Neb) 0.5-2.5 mg/3 mL nebulizer solution 3 mL (3 mL nebulization Given 6/18/25 1429)   cefTRIAXone (Rocephin) 2 g in dextrose (iso) IV 50 mL (0 g intravenous Stopped 6/18/25 1956)   doxycycline (Vibramycin) 100 mg in sodium chloride 0.9%  mL (0 mg intravenous Stopped 6/18/25 2133)   iohexol (OMNIPaque) 350 mg iodine/mL solution 60 mL (60 mL intravenous Given 6/18/25 1813)   ipratropium-albuteroL (Duo-Neb) 0.5-2.5 mg/3 mL nebulizer solution 3 mL (3 mL nebulization Given 6/18/25 1957)   methylPREDNISolone sod succinate (SOLU-Medrol) injection 125 mg (125 mg intravenous Given 6/18/25 2003)       New Prescriptions from this visit:    Current Discharge Medication List          Follow-up:  No follow-up provider specified.      Final Impression:   1. Pneumonia due to infectious organism, unspecified laterality, unspecified part of lung    2. Shortness  of breath          Please excuse any misspellings or unintended errors related to the Dragon speech recognition software used to dictate this note.    I reviewed the case with the attending ED physician. The attending ED physician agrees with the plan.        Charity Nelson MD  Resident  06/18/25 1925       [1]   Past Medical History:  Diagnosis Date    Hypertension     Personal history of other diseases of the digestive system     History of gallbladder disease    Personal history of other diseases of the musculoskeletal system and connective tissue     History of arthritis    Personal history of other diseases of the musculoskeletal system and connective tissue     History of osteopenia    Personal history of other diseases of the respiratory system     History of allergic rhinitis    Personal history of other infectious and parasitic diseases     History of hepatitis    Personal history of other malignant neoplasm of skin     History of basal cell cancer    Personal history of other medical treatment     History of mammogram    Physical debility 03/27/2023    Pure hypercholesterolemia, unspecified     Elevated LDL cholesterol level   [2]   Past Surgical History:  Procedure Laterality Date    DENTAL IMPLANT      OTHER SURGICAL HISTORY  12/04/2019    Spinal surgery    OTHER SURGICAL HISTORY  12/04/2019    Salpingo-oophorectomy bilateral    OTHER SURGICAL HISTORY  12/04/2019    Tonsillectomy    OTHER SURGICAL HISTORY  12/04/2019    Adenoidectomy    OTHER SURGICAL HISTORY  12/04/2019    Total hysterectomy abdominal    OTHER SURGICAL HISTORY  12/04/2019    Cholecystectomy    OTHER SURGICAL HISTORY  12/04/2019    Lumbar discectomy    OTHER SURGICAL HISTORY  03/23/2022    Back surgery    OTHER SURGICAL HISTORY  03/23/2022    Hysterectomy    OTHER SURGICAL HISTORY  03/23/2022    Surgery    OTHER SURGICAL HISTORY  03/23/2022    Colonoscopy    OTHER SURGICAL HISTORY  03/23/2022    Appendectomy   [3]   Family  History  Problem Relation Name Age of Onset    Other (cardiac disorder) Mother      Coronary artery disease Mother      Diabetes Mother      Hypertension Mother      Dementia Father     [4]   Social History  Tobacco Use    Smoking status: Never    Smokeless tobacco: Never   Vaping Use    Vaping status: Never Used   Substance Use Topics    Alcohol use: Never    Drug use: Never        Charity Nelson MD  Resident  06/19/25 1600

## 2025-06-18 NOTE — PROGRESS NOTES
"Subjective   Patient ID: Dora Guillermo is a 80 y.o. female who presents for Wheezing (Severe cough, productive of thick yellow sputum intermittently, reports pulse ox was down to 85% last night while trying to sleep upright in a recliner without legs elevated.), Shortness of Breath, and Cough.   Patient was accompanied by her daughter at this visit.   Wheezing   This is a new problem. Episode onset: 1 week. The problem occurs constantly. The problem has been gradually worsening. Associated symptoms include coughing (intermittently productive of thick, yellow phlegm) and shortness of breath. Pertinent negatives include no chest pain. Associated symptoms comments: Ears plugged  Nasal congestion  . The symptoms are aggravated by lying flat and any activity. Treatments tried: 7 days of Amoxil, Bromphed-DM PRN, Zyrtec, Flonase. The treatment provided no relief. Her past medical history is significant for pneumonia. There is no history of asthma, chronic lung disease, COPD, heart failure or past MI. remote h/o prior PNA per pt. Never smoker/never vaped    Review of Systems   Constitutional:  Positive for fatigue.   HENT:  Negative for postnasal drip and trouble swallowing.    Respiratory:  Positive for cough (intermittently productive of thick, yellow phlegm), chest tightness, shortness of breath and wheezing. Negative for stridor.    Cardiovascular:  Negative for chest pain, palpitations and leg swelling.   Psychiatric/Behavioral:  Positive for sleep disturbance (Had to sleep sitting up in recliner (in upright position without legs up) last night d/t dyspnea, cough, and \"wheezing kept me awake.\").    Objective   /78   Pulse 75   Temp 36.6 °C (97.8 °F) (Temporal)   Resp 21   Wt 110 kg (242 lb)   SpO2 (!) 89%   BMI 39.06 kg/m²   Physical Exam  Constitutional:       General: She is in acute distress.      Appearance: Normal appearance. She is obese. She is ill-appearing. She is not toxic-appearing. "   Cardiovascular:      Rate and Rhythm: Normal rate and regular rhythm.      Heart sounds: No murmur heard.  Pulmonary:      Effort: No tachypnea or respiratory distress.      Breath sounds: No stridor. Wheezing (Diffuse, tito, insp and exp.) and rales (scattered, tito, but worst in RLL and RML) present.   Musculoskeletal:      Right lower leg: Edema (2+ non-pitting ankle edema) present.      Left lower leg: Edema (2+ non-pitting ankle edema) present.   Neurological:      Mental Status: She is alert.     Assessment/Plan   Problem List Items Addressed This Visit    None  Visit Diagnoses         Acute hypoxic respiratory failure    -  Primary    Nebulizer given improved to 94%, then dropped again to 90-92%. Given her hypoxia and SOB w/ min. exertion, sending to [facility] ER by [facility] for concern for PNA      Diffuse wheezing        Diffuse insp/exp wheezing tito. Treated w/ nebulizer, which provided temporary improvement. Suspected CAP    Relevant Medications    albuterol 2.5 mg /3 mL (0.083 %) nebulizer solution 2.5 mg (Start on 6/18/2025  2:00 PM)      Respiratory crackles of both lungs        Relevant Medications    albuterol 2.5 mg /3 mL (0.083 %) nebulizer solution 2.5 mg (Start on 6/18/2025  2:00 PM)      Shortness of breath        Relevant Medications    albuterol 2.5 mg /3 mL (0.083 %) nebulizer solution 2.5 mg (Start on 6/18/2025  2:00 PM)

## 2025-06-19 LAB
ANION GAP SERPL CALC-SCNC: 14 MMOL/L (ref 10–20)
ANION GAP SERPL CALC-SCNC: 15 MMOL/L (ref 10–20)
BUN SERPL-MCNC: 23 MG/DL (ref 6–23)
BUN SERPL-MCNC: 30 MG/DL (ref 6–23)
CALCIUM SERPL-MCNC: 9.4 MG/DL (ref 8.6–10.3)
CALCIUM SERPL-MCNC: 9.5 MG/DL (ref 8.6–10.3)
CHLORIDE SERPL-SCNC: 100 MMOL/L (ref 98–107)
CHLORIDE SERPL-SCNC: 103 MMOL/L (ref 98–107)
CO2 SERPL-SCNC: 23 MMOL/L (ref 21–32)
CO2 SERPL-SCNC: 24 MMOL/L (ref 21–32)
CREAT SERPL-MCNC: 1.06 MG/DL (ref 0.5–1.05)
CREAT SERPL-MCNC: 1.25 MG/DL (ref 0.5–1.05)
EGFRCR SERPLBLD CKD-EPI 2021: 44 ML/MIN/1.73M*2
EGFRCR SERPLBLD CKD-EPI 2021: 54 ML/MIN/1.73M*2
ERYTHROCYTE [DISTWIDTH] IN BLOOD BY AUTOMATED COUNT: 14.4 % (ref 11.5–14.5)
GLUCOSE SERPL-MCNC: 208 MG/DL (ref 74–99)
GLUCOSE SERPL-MCNC: 216 MG/DL (ref 74–99)
HADV DNA SPEC QL NAA+PROBE: NOT DETECTED
HCT VFR BLD AUTO: 43.3 % (ref 36–46)
HGB BLD-MCNC: 13.9 G/DL (ref 12–16)
HMPV RNA SPEC QL NAA+PROBE: NOT DETECTED
HPIV1 RNA SPEC QL NAA+PROBE: NOT DETECTED
HPIV2 RNA SPEC QL NAA+PROBE: NOT DETECTED
HPIV3 RNA SPEC QL NAA+PROBE: NOT DETECTED
HPIV4 RNA SPEC QL NAA+PROBE: NOT DETECTED
MCH RBC QN AUTO: 27.9 PG (ref 26–34)
MCHC RBC AUTO-ENTMCNC: 32.1 G/DL (ref 32–36)
MCV RBC AUTO: 87 FL (ref 80–100)
NRBC BLD-RTO: 0 /100 WBCS (ref 0–0)
PLATELET # BLD AUTO: 239 X10*3/UL (ref 150–450)
POTASSIUM SERPL-SCNC: 3.3 MMOL/L (ref 3.5–5.3)
POTASSIUM SERPL-SCNC: 3.6 MMOL/L (ref 3.5–5.3)
RBC # BLD AUTO: 4.98 X10*6/UL (ref 4–5.2)
RHINOVIRUS RNA UPPER RESP QL NAA+PROBE: NOT DETECTED
SODIUM SERPL-SCNC: 135 MMOL/L (ref 136–145)
SODIUM SERPL-SCNC: 137 MMOL/L (ref 136–145)
WBC # BLD AUTO: 8.7 X10*3/UL (ref 4.4–11.3)

## 2025-06-19 PROCEDURE — 97161 PT EVAL LOW COMPLEX 20 MIN: CPT | Mod: GP

## 2025-06-19 PROCEDURE — 82374 ASSAY BLOOD CARBON DIOXIDE: CPT | Performed by: STUDENT IN AN ORGANIZED HEALTH CARE EDUCATION/TRAINING PROGRAM

## 2025-06-19 PROCEDURE — 97165 OT EVAL LOW COMPLEX 30 MIN: CPT | Mod: GO

## 2025-06-19 PROCEDURE — 80048 BASIC METABOLIC PNL TOTAL CA: CPT | Performed by: INTERNAL MEDICINE

## 2025-06-19 PROCEDURE — 99232 SBSQ HOSP IP/OBS MODERATE 35: CPT | Performed by: INTERNAL MEDICINE

## 2025-06-19 PROCEDURE — 2500000002 HC RX 250 W HCPCS SELF ADMINISTERED DRUGS (ALT 637 FOR MEDICARE OP, ALT 636 FOR OP/ED): Performed by: STUDENT IN AN ORGANIZED HEALTH CARE EDUCATION/TRAINING PROGRAM

## 2025-06-19 PROCEDURE — 36415 COLL VENOUS BLD VENIPUNCTURE: CPT | Performed by: STUDENT IN AN ORGANIZED HEALTH CARE EDUCATION/TRAINING PROGRAM

## 2025-06-19 PROCEDURE — 94640 AIRWAY INHALATION TREATMENT: CPT

## 2025-06-19 PROCEDURE — 94669 MECHANICAL CHEST WALL OSCILL: CPT

## 2025-06-19 PROCEDURE — 1200000002 HC GENERAL ROOM WITH TELEMETRY DAILY

## 2025-06-19 PROCEDURE — 2500000001 HC RX 250 WO HCPCS SELF ADMINISTERED DRUGS (ALT 637 FOR MEDICARE OP): Performed by: STUDENT IN AN ORGANIZED HEALTH CARE EDUCATION/TRAINING PROGRAM

## 2025-06-19 PROCEDURE — 2500000004 HC RX 250 GENERAL PHARMACY W/ HCPCS (ALT 636 FOR OP/ED): Performed by: INTERNAL MEDICINE

## 2025-06-19 PROCEDURE — 36415 COLL VENOUS BLD VENIPUNCTURE: CPT | Performed by: INTERNAL MEDICINE

## 2025-06-19 PROCEDURE — 2500000005 HC RX 250 GENERAL PHARMACY W/O HCPCS

## 2025-06-19 PROCEDURE — 85027 COMPLETE CBC AUTOMATED: CPT | Performed by: STUDENT IN AN ORGANIZED HEALTH CARE EDUCATION/TRAINING PROGRAM

## 2025-06-19 PROCEDURE — 97535 SELF CARE MNGMENT TRAINING: CPT | Mod: GO

## 2025-06-19 PROCEDURE — 2500000004 HC RX 250 GENERAL PHARMACY W/ HCPCS (ALT 636 FOR OP/ED): Performed by: STUDENT IN AN ORGANIZED HEALTH CARE EDUCATION/TRAINING PROGRAM

## 2025-06-19 RX ORDER — SODIUM CHLORIDE 9 MG/ML
50 INJECTION, SOLUTION INTRAVENOUS CONTINUOUS
Status: ACTIVE | OUTPATIENT
Start: 2025-06-19 | End: 2025-06-19

## 2025-06-19 RX ADMIN — IPRATROPIUM BROMIDE AND ALBUTEROL SULFATE 3 ML: 2.5; .5 SOLUTION RESPIRATORY (INHALATION) at 09:08

## 2025-06-19 RX ADMIN — ENOXAPARIN SODIUM 40 MG: 100 INJECTION SUBCUTANEOUS at 09:01

## 2025-06-19 RX ADMIN — IPRATROPIUM BROMIDE AND ALBUTEROL SULFATE 3 ML: 2.5; .5 SOLUTION RESPIRATORY (INHALATION) at 20:02

## 2025-06-19 RX ADMIN — IPRATROPIUM BROMIDE AND ALBUTEROL SULFATE 3 ML: 2.5; .5 SOLUTION RESPIRATORY (INHALATION) at 14:14

## 2025-06-19 RX ADMIN — ATORVASTATIN CALCIUM 40 MG: 40 TABLET, FILM COATED ORAL at 09:01

## 2025-06-19 RX ADMIN — AMLODIPINE BESYLATE 5 MG: 5 TABLET ORAL at 09:01

## 2025-06-19 RX ADMIN — PREDNISONE 40 MG: 20 TABLET ORAL at 09:01

## 2025-06-19 RX ADMIN — OXYBUTYNIN CHLORIDE 5 MG: 5 TABLET ORAL at 20:17

## 2025-06-19 RX ADMIN — SODIUM CHLORIDE 50 ML/HR: 0.9 INJECTION, SOLUTION INTRAVENOUS at 09:02

## 2025-06-19 RX ADMIN — Medication 2 L/MIN: at 20:02

## 2025-06-19 RX ADMIN — OXYBUTYNIN CHLORIDE 5 MG: 5 TABLET ORAL at 09:01

## 2025-06-19 RX ADMIN — IPRATROPIUM BROMIDE AND ALBUTEROL SULFATE 3 ML: 2.5; .5 SOLUTION RESPIRATORY (INHALATION) at 00:46

## 2025-06-19 RX ADMIN — Medication 3 L/MIN: at 00:46

## 2025-06-19 ASSESSMENT — COGNITIVE AND FUNCTIONAL STATUS - GENERAL
MOVING TO AND FROM BED TO CHAIR: A LITTLE
HELP NEEDED FOR BATHING: A LITTLE
CLIMB 3 TO 5 STEPS WITH RAILING: A LITTLE
DAILY ACTIVITIY SCORE: 19
MOBILITY SCORE: 18
STANDING UP FROM CHAIR USING ARMS: A LITTLE
DRESSING REGULAR LOWER BODY CLOTHING: A LITTLE
CLIMB 3 TO 5 STEPS WITH RAILING: A LITTLE
STANDING UP FROM CHAIR USING ARMS: A LITTLE
DRESSING REGULAR UPPER BODY CLOTHING: A LITTLE
WALKING IN HOSPITAL ROOM: A LITTLE
PERSONAL GROOMING: A LITTLE
MOVING FROM LYING ON BACK TO SITTING ON SIDE OF FLAT BED WITH BEDRAILS: A LITTLE
DAILY ACTIVITIY SCORE: 22
TOILETING: A LITTLE
DRESSING REGULAR LOWER BODY CLOTHING: A LITTLE
MOVING TO AND FROM BED TO CHAIR: A LITTLE
TURNING FROM BACK TO SIDE WHILE IN FLAT BAD: A LITTLE
WALKING IN HOSPITAL ROOM: A LITTLE
MOBILITY SCORE: 20
HELP NEEDED FOR BATHING: A LITTLE

## 2025-06-19 ASSESSMENT — PAIN SCALES - GENERAL
PAINLEVEL_OUTOF10: 0 - NO PAIN
PAINLEVEL_OUTOF10: 0 - NO PAIN
PAINLEVEL_OUTOF10: 1

## 2025-06-19 ASSESSMENT — ACTIVITIES OF DAILY LIVING (ADL)
HOME_MANAGEMENT_TIME_ENTRY: 13
BATHING_ASSISTANCE: STAND BY
LACK_OF_TRANSPORTATION: NO

## 2025-06-19 ASSESSMENT — PAIN - FUNCTIONAL ASSESSMENT
PAIN_FUNCTIONAL_ASSESSMENT: 0-10
PAIN_FUNCTIONAL_ASSESSMENT: 0-10

## 2025-06-19 NOTE — CARE PLAN
The patient's goals for the shift include to be able to do ADL's without being short of breath    The clinical goals for the shift include PT.WILL BE SEEN BY PCP/CONSULTS,RECIEVE PRESCRIBED MEDS/TX'S.

## 2025-06-19 NOTE — H&P
History of Present Illness   80-year-old female with medical history significant for hypertension, hyperlipidemia, CKD 3, obesity presenting to the hospital shortness of breath.  Patient reports a weeklong history of productive cough with yellow sputum and gradually worsening shortness of breath. She was on amoxicillin over the past week without major improvement.  no prior tobacco or smoking history.  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  Has never had any significant lung issues in the past.  Is up-to-date with her pneumonia and RSV shots.    In the emergency department, patient initially was hypertensive.  She was hypoxic and required 6 L O2 nasal cannula. lab work fairly unremarkable.  COVID/flu negative. CTA chest without PE and noted mild bibasilar atelectasis.    A 12 point ROS was elicited from patient and negative except as noted above in the HPI.     Past Medical History  She has a past medical history of Hypertension, Personal history of other diseases of the digestive system, Personal history of other diseases of the musculoskeletal system and connective tissue, Personal history of other diseases of the musculoskeletal system and connective tissue, Personal history of other diseases of the respiratory system, Personal history of other infectious and parasitic diseases, Personal history of other malignant neoplasm of skin, Personal history of other medical treatment, Physical debility (03/27/2023), and Pure hypercholesterolemia, unspecified.    Surgical History  She has a past surgical history that includes Other surgical history (12/04/2019); Other surgical history (12/04/2019); Other surgical history (12/04/2019); Other surgical history (12/04/2019); Other surgical history (12/04/2019); Other surgical history (12/04/2019); Other surgical history (12/04/2019); Other surgical history (03/23/2022); Other surgical history (03/23/2022);  Other surgical history (03/23/2022); Other surgical history (03/23/2022); Other surgical history (03/23/2022); and Implant.     Social History  She reports that she has never smoked. She has never used smokeless tobacco. She reports that she does not drink alcohol and does not use drugs.    Family History  Family History[1]     Allergies  Clarithromycin, Montelukast, Bactrim [sulfamethoxazole-trimethoprim], Cat dander, and Morphine    Scheduled medications  Scheduled Medications[2]  Continuous medications  Continuous Medications[3]  PRN medications  PRN Medications[4]      Physical Exam     Constitutional: Well developed, no distress, alert and cooperative  Skin: Warm and dry  Eyes: EOMI, clear sclera  ENMT: mucous membranes moist  Respiratory: Diminished bilaterally with wheezing  Cardiovascular: Regular, rate and rhythm  Abdominal: Nondistended, soft  MSK: ROM intact  Neuro: alert and oriented x3      Assessment:   80-year-old female with medical history significant for hypertension, hyperlipidemia, CKD 3, obesity presenting to the hospital shortness of breath.  Patient reports a weeklong history of productive cough with yellow sputum and gradually worsening shortness of breath     Plan:  Assessment & Plan      Acute Hypoxic Respiratory Failure  -No baseline oxygen requirements, was initially requiring 6L O2 NC  -CTA PE without consolidation or PE, but wheezing on exam  -Plan for treatment with steroids, scheduled breathing treatments  -Hold off on additional antibiotics, recently completed course of amoxacillin   -Check sputum culture, respiratory panel  -Will benefit from outpatient pulm referral   -Wean oxygen as able    HTN  -Continue lisinopril and amlodipine    HLD  -Continue atorvastatin     VTE ppx:  Lovenox  Code:  Full    Raúl Daigle DO  [facility]    This document was generated in whole or in part using the Dragon One medical voice recognition software and there may  be some incorrect words/wording, spelling, or punctuation errors that were not corrected prior to finalization in the medical record.         [1]   Family History  Problem Relation Name Age of Onset    Other (cardiac disorder) Mother      Coronary artery disease Mother      Diabetes Mother      Hypertension Mother      Dementia Father     [2]    [3]    [4] PRN medications: oxygen

## 2025-06-19 NOTE — PROGRESS NOTES
"            Patient Name: Dora Guillermo   YOB: 1945    Subjective  Still notes that she is short of breath, requiring supplemental oxygen supplementation after being admitted last night, although was feeling much better; she notes that she has been stressed recently because of her  being hospitalized and this being rough, she has actually been doing some of the gardening and yard work outside well he has been hospitalized in has had a little bit of difficulty with breathing especially with the weather and she did corroborate that she had a lot of secondhand smoke exposure as a young woman around her father and in work areas in her younger adult life.  She denies having any fevers or chills, although she is still coughing, she is not having a lot of of increased sputum production but feels like she is having some congestion still on her chest.     Objective  /57 (BP Location: Left arm, Patient Position: Lying)   Pulse 80   Temp 36.2 °C (97.2 °F) (Temporal)   Resp 18   Ht 1.676 m (5' 6\")   Wt 109 kg (240 lb 4.8 oz)   SpO2 94%   BMI 38.79 kg/m²     Physical Exam   Constitutional: Well developed, no distress, alert and cooperative  Skin: Warm and dry  Eyes: clear sclera  ENMT: mucous membranes moist  Respiratory: Diminished bilaterally with wheezing at end expiration, down to 3 L/min nasal cannula  Cardiovascular: Regular, rate and rhythm  Abdominal: Nondistended, soft  MSK: ROM intact  Neuro: alert and oriented x3    Scheduled medications  Scheduled Medications[1]  Continuous medications  Continuous Medications[2]  PRN medications  PRN Medications[3]     Lab Results   Component Value Date    WBC 8.7 06/19/2025    HGB 13.9 06/19/2025    HCT 43.3 06/19/2025    MCV 87 06/19/2025     06/19/2025     Lab Results   Component Value Date    GLUCOSE 208 (H) 06/19/2025    CALCIUM 9.5 06/19/2025     (L) 06/19/2025    K 3.3 (L) 06/19/2025    CO2 23 06/19/2025     06/19/2025    " BUN 30 (H) 06/19/2025    CREATININE 1.25 (H) 06/19/2025       Assessment  This is a very pleasant 80 y.o. lady with a known history of hypertension, hyperlipidemia, CKD 3, and obesity who presents with shortness of breath and increased sputum production congruent with COPD exacerbation.    Plan  Assessment & Plan    Acute Hypoxic Respiratory Failure  COPD exacerbation  -No baseline oxygen requirements, was initially requiring 6L O2 NC, down to 3L/min  -Continue with incentive spirometer and flutter device  - Respiratory burst pattern glucocorticoid, scheduled breathing treatments  -Hold off on additional antibiotics, recently completed course of amoxacillin  -Check sputum culture, respiratory panel  -Will benefit from outpatient pulm referral as outpatient  -Wean oxygen, SpO2 goal >90%     HTN  -Continue lisinopril and amlodipine     HLD  -Continue atorvastatin      VTE ppx:  Lovenox  Code:  Full    I certify that greater than 2 midnights will be required in the hospital to facilitate this patient's diagnosis and treatment of the presenting problems as documented above.    I spent 30 minutes in the professional and overall care of this patient. More than 50% of this time was spent examining and counseling patient, reviewing plan of care, and coordinating medical care.    Ludwin Cortes MD  [facility]    This document was generated in whole or in part using the Dragon One medical voice recognition software and there may be some incorrect words/wording, spelling, or punctuation errors that were not corrected prior to finalization in the medical record.       [1] amLODIPine, 5 mg, oral, Daily  atorvastatin, 40 mg, oral, Daily  [Held by provider] dapagliflozin propanediol, 5 mg, oral, Daily  enoxaparin, 40 mg, subcutaneous, q24h  ipratropium-albuteroL, 3 mL, nebulization, q6h  [Held by provider] lisinopril, 40 mg, oral, Daily  oxyBUTYnin, 5 mg, oral, BID  predniSONE, 40 mg, oral,  Daily  [2] sodium chloride 0.9%, 50 mL/hr, Last Rate: 50 mL/hr (06/19/25 0902)  [3] PRN medications: ipratropium-albuteroL, oxygen

## 2025-06-19 NOTE — CARE PLAN
The patient's goals for the shift include to be able to do ADL's without being short of breath    The clinical goals for the shift include remain hemodynamically stable

## 2025-06-19 NOTE — PROGRESS NOTES
"Nutrition Initial Assessment:   Nutrition Assessment    Reason for Assessment: Admission nursing screening    Patient is a 80 y.o. female presenting with shortness of breath.    Medical history significant for hypertension, hyperlipidemia, CKD 3, obesity     Nutrition History:  Energy Intake: Good > 75 %  Pain affecting nutrition status: N/A  Food and Nutrient History: Pt eating very well on regular diet today. She enjoyed her breakfast and has already ordered lunch. She admits that eating has been quite challenging lately, however because of her 's declining health status. He is currently in the ICU s/p stroke. She explains that he has been back & forth to various dr appts and then to an acute care rehab. She explains her insurance company sent them frozen meals (1-month supply), but they were inedible. She has started buying [employer] which both of them like. She will have one a day and the remainder of the day is snacks. She has a \"plate\" in her lower jaw that makes some chewing difficult, but she handles chips fine. She will have soft fruit and the Activia yogurt. She is not happy with her weight status and would like to lose weight to help her breathing. She recalls that she was successful with wt loss several years ago while following the keto diet (down to 218 lbs), but since her spouse has no teeth and survives on mac & cheese and mashed potatoes, she finds it hard to follow a seperate \"no carb\" diet.  Food Allergy:  (pt denies)       Anthropometrics:  Height: 167.6 cm (5' 6\")   Weight: 109 kg (240 lb 4.8 oz)   BMI (Calculated): 38.8  IBW/kg (Dietitian Calculated): 58.97 kg  Percent of IBW: 184.85 %                      Weight History:   Wt Readings from Last 15 Encounters:   06/18/25 109 kg (240 lb 4.8 oz)   06/18/25 110 kg (242 lb)   06/10/25 110 kg (241 lb 9.6 oz)   05/12/25 112 kg (246 lb 9.6 oz)   04/10/25 113 kg (250 lb 3.2 oz)   02/28/25 112 kg (246 lb)   12/23/24 113 kg (250 lb)   12/14/24 " 113 kg (250 lb)   11/21/24 113 kg (249 lb 3.2 oz)   09/19/24 112 kg (248 lb)   09/17/24 112 kg (247 lb)   07/19/24 112 kg (247 lb 9.6 oz)   06/14/24 112 kg (246 lb)   05/14/24 112 kg (248 lb)   04/22/24 112 kg (247 lb 6.4 oz)       Weight Change %:  Significant Weight Loss: No    Nutrition Focused Physical Exam Findings:    Subcutaneous Fat Loss:   Orbital Fat Pads: Well nourished (slightly bulging fat pads)  Buccal Fat Pads: Well nourished (full, rounded cheeks)  Ribs: Well nourished (chest is full, ribs do not show, slight to no protrusion of the iliac crest)  Muscle Wasting:  Temporalis: Well nourished (well-defined muscle)  Pectoralis (Clavicular Region): Well nourished (clavicle not visible)  Deltoid/Trapezius: Well nourished (rounded appearance at arm, shoulder, neck)  Interosseous: Well nourished (muscle bulges)  Quadriceps: Well nourished (well developed, well rounded)  Gastrocnemius: Well nourished (well developed bulbous muscle)  Edema:  Edema: +2 mild  Edema Location: both ankles  Physical Findings:  Hair: Negative  Eyes: Negative  Nails: Negative  Skin: Negative  Respiratory : Positive (Pt does not use O2 at home, but is presently wearing it.)  Mouth Findings: Chewing difficulty  Teeth Findings: Other (comment) (bottom plate)    Nutrition Significant Labs:  CBC Trend:   Results from last 7 days   Lab Units 06/19/25  0605 06/18/25  1456   WBC AUTO x10*3/uL 8.7 5.8   RBC AUTO x10*6/uL 4.98 5.08   HEMOGLOBIN g/dL 13.9 14.1   HEMATOCRIT % 43.3 43.9   MCV fL 87 86   PLATELETS AUTO x10*3/uL 239 218    , BMP Trend:   Results from last 7 days   Lab Units 06/19/25  0605 06/18/25  1456   GLUCOSE mg/dL 216* 105*   CALCIUM mg/dL 9.4 9.4   SODIUM mmol/L 137 138   POTASSIUM mmol/L 3.6 4.1   CO2 mmol/L 24 26   CHLORIDE mmol/L 103 103   BUN mg/dL 23 20   CREATININE mg/dL 1.06* 0.88        Nutrition Specific Medications:  Scheduled medications  Scheduled Medications[1]  Continuous medications  Continuous  "Medications[2]  PRN medications  PRN Medications[3]     I/O:   Last BM Date: 06/18/25;      Dietary Orders (From admission, onward)       Start     Ordered    06/18/25 2254  May Participate in Room Service  ( ROOM SERVICE MAY PARTICIPATE)  Once        Question:  .  Answer:  Yes    06/18/25 2253 06/18/25 2134  Adult diet Regular  Diet effective now        Question:  Diet type  Answer:  Regular    06/18/25 2133                     Estimated Needs:      Method for Estimating Needs: 9830-6404 kcal (20-25 kcal/kg)     Method for Estimating 24 Hour Protein Needs: 87-109g protein (0.8-1.0g/kg)     Method for Estimating 24 Hour Fluid Needs: 2100-2700mL (1mL/kcal) or per physician        Nutrition Diagnosis   Malnutrition Diagnosis  Patient has Malnutrition Diagnosis: No    Nutrition Diagnosis  Patient has Nutrition Diagnosis: Yes  Diagnosis Status (1): New  Nutrition Diagnosis 1: Obesity class II  Related to (1): lifestyle choices  As Evidenced by (1): BMI = 38.79 kg/m2       Nutrition Interventions/Recommendations   Nutrition prescription for oral nutrition    Nutrition Recommendations:  Individualized Nutrition Prescription Provided for : Continue REGULAR diet as ordered.    Nutrition Interventions/Goals:    Diet education provided. Will continue to follow.      Education Documentation  Nutrition Related Education, taught by Bella Hook RDN, LD at 6/19/2025  2:43 PM.  Learner: Patient  Readiness: Acceptance  Method: Explanation, Handout  Response: Verbalizes Understanding  Comment: Provided handouts: \"Eating Right for Older Adults\" and \"Planning Fast & Healthy Dinners\" with RD contact information              Nutrition Monitoring and Evaluation   Estimated Energy Intake: Energy intake greater or equal to 75% of estimated energy needs                   Goal Status: New goal(s) identified    Time Spent (min): 45 minutes              [1] amLODIPine, 5 mg, oral, Daily  atorvastatin, 40 mg, oral, Daily  [Held by " provider] dapagliflozin propanediol, 5 mg, oral, Daily  enoxaparin, 40 mg, subcutaneous, q24h  ipratropium-albuteroL, 3 mL, nebulization, q6h  [Held by provider] lisinopril, 40 mg, oral, Daily  oxyBUTYnin, 5 mg, oral, BID  predniSONE, 40 mg, oral, Daily     [2] sodium chloride 0.9%, 50 mL/hr, Last Rate: 50 mL/hr (06/19/25 0902)     [3] PRN medications: ipratropium-albuteroL, oxygen

## 2025-06-19 NOTE — PROGRESS NOTES
06/19/25 1440   Discharge Planning   Living Arrangements Spouse/significant other   Support Systems Spouse/significant other   Assistance Needed none   Type of Residence Private residence   Home or Post Acute Services None   Expected Discharge Disposition Home   Does the patient need discharge transport arranged? No   Financial Resource Strain   How hard is it for you to pay for the very basics like food, housing, medical care, and heating? Not hard   Housing Stability   In the last 12 months, was there a time when you were not able to pay the mortgage or rent on time? N   At any time in the past 12 months, were you homeless or living in a shelter (including now)? N   Transportation Needs   In the past 12 months, has lack of transportation kept you from medical appointments or from getting medications? no   In the past 12 months, has lack of transportation kept you from meetings, work, or from getting things needed for daily living? No   Stroke Family Assessment   Stroke Family Assessment Needed No   Intensity of Service   Intensity of Service 0-30 min     Met with patient at the bedside, confirmed: demographics, insurance, and pcp is Dr. Zafar. She is independent with her care needs and assists with her 's care . Her  is currently in ICU .   She is currently on 4 LNC but does not use at home.   She denies financial concerns, able to purchase medications, and daily expenses.   She takes her medications as ordered and received education.  She plans to discharge home with no needs.   Requesting a respiratory provider list for outpatient follow up. List provided.

## 2025-06-19 NOTE — PROGRESS NOTES
Physical Therapy    Physical Therapy Evaluation    Patient Name: Dora Guillermo  MRN: [id number]  Today's Date: 6/19/2025   Time Calculation  Start Time: 0946  Stop Time: 1004  Time Calculation (min): 18 min  3131/3131-A    Assessment/Plan   PT Assessment  PT Assessment Results: Impaired balance, Decreased mobility, Decreased endurance, Pain  Rehab Prognosis: Good  Barriers to Discharge Home: No anticipated barriers  Evaluation/Treatment Tolerance: Patient tolerated treatment well  Medical Staff Made Aware: Yes  End of Session Communication: Bedside nurse  Assessment Comment: Pt presents with impaired mobility and impaired balance. Pt would benefit from therapy for the duration of their stay. Upon discharge pt would benefit from low intensity therapy to improve their mobility and function.  End of Session Patient Position: Up in chair, Alarm on  IP OR SWING BED PT PLAN  Inpatient or Swing Bed: Inpatient  PT Plan  Treatment/Interventions: Transfer training, Gait training, Balance training, Endurance training, Therapeutic activity, Therapeutic exercise, Bed mobility  PT Plan: Ongoing PT  PT Frequency: 3 times per week  PT Discharge Recommendations: Low intensity level of continued care  Equipment Recommended upon Discharge: Wheeled walker (Continue using rollator as needed outdoors)  PT Recommended Transfer Status: Contact guard  PT - OK to Discharge: Yes (To next level of care when cleared by medical team)    Subjective     Current Problem:  1. Pneumonia due to infectious organism, unspecified laterality, unspecified part of lung        2. Shortness of breath          Problem List[1]    General Visit Information:  General: Pt  presents to the ER for shortness of breath and flulike symptoms for the past 7 days.  The patient reports last night, her pulse ox measured her saturation at 85%, she was at her PCPs office today and it was in the low 90s, she was given a DuoNeb treatment which improved her pulse ox slightly.   While en route to the emergency department, she was given an additional DuoNeb treatment and albuterol from EMS.  The patient reports chills earlier today but has not measured a fever.  The patient reports that she has been producing a productive cough with white sputum.  The patient was recently diagnosed with nasopharyngitis, she was given a prescription for amoxicillin to take for 7 days.  She finished her prescription this morning.  The patient reports that her granddaughter and her daughter has been sick with similar symptoms.   Reason for Referral: Impaired mobility  Referred By: Ludwin Cortes  Prior to Session Communication: Bedside nurse  Patient Position Received: Up in chair, Alarm on    Home Living:  Home Living  Home Living Comments: Pt lives with  in grandparent suite. Ramp to enter. Pt resides on main level. WIS with chair and GBS. High toliet seat.    Prior Level of Function:  Prior Function Per Pt/Caregiver Report  Prior Function Comments: IND in ADLs. Pt ambulated holding onto furniture within home and used a rollator outside the house. Pt drives and shops. Pt reports fall 2 weeks ago    Precautions:  Precautions  Medical Precautions: Fall precautions, Oxygen therapy device and L/min (2LNC)    Vital Signs:  Vital Signs  SpO2: 96 % (84% on 2LNC on initial stand, 30 sec with pursed lip breathing for SpO2 to go to 96%. 92% after ambulation.)  Objective     Pain:  Pain Assessment  Pain Assessment: 0-10  0-10 (Numeric) Pain Score: 1  Pain Type: Acute pain  Pain Location: Head  Pain Interventions: Repositioned    Cognition:  Cognition  Overall Cognitive Status: Within Functional Limits  Orientation Level: Oriented X4    General Assessments:      Activity Tolerance  Endurance: Tolerates 10 - 20 min exercise with multiple rests        Static Sitting Balance  Static Sitting-Comment/Number of Minutes: Good  Dynamic Sitting Balance  Dynamic Sitting-Comments: Good  Static Standing Balance  Static  Standing-Comment/Number of Minutes: Good  Dynamic Standing Balance  Dynamic Standing-Comments: Fair +    Functional Assessments:     Bed Mobility  Bed Mobility:  (NT 2/2 pt seated at EOB upon arrival)  Transfers  Transfer: Yes  Transfer 1  Trials/Comments 1: Sit <> stand CGA. (Sit <> stand on toliet CGA with GBS. Pt is IND in pericare in sitting.)  Ambulation/Gait Training  Ambulation/Gait Training Performed: Yes  Ambulation/Gait Training 1  Comments/Distance (ft) 1: Pt ambulated 15 x 2 ft using FWW with CGA.          Extremity/Trunk Assessments:        RLE   RLE : Within Functional Limits  LLE   LLE : Within Functional Limits    Outcome Measures:     [facility] Basic Mobility  Turning from your back to your side while in a flat bed without using bedrails: None  Moving from lying on your back to sitting on the side of a flat bed without using bedrails: None  Moving to and from bed to chair (including a wheelchair): A little  Standing up from a chair using your arms (e.g. wheelchair or bedside chair): A little  To walk in hospital room: A little  Climbing 3-5 steps with railing: A little  Basic Mobility - Total Score: 20     Goals:  Encounter Problems       Encounter Problems (Active)       PT Problem       Pt will perform all bed mobility tasks with IND  (Progressing)       Start:  06/19/25    Expected End:  07/03/25            Pt will perform all transfers with Mod I and proper safety mechanics  (Progressing)       Start:  06/19/25    Expected End:  07/03/25            Pt will ambulate 150 ft with Mod I using LRD for improved functional independence  (Progressing)       Start:  06/19/25    Expected End:  07/03/25            Pt will be able to ambulate at least 150 ft with < or equal to 1 rest break while maintaining SpO2 > 90%  (Progressing)       Start:  06/19/25    Expected End:  07/03/25               Pain - Adult            Education Documentation  Mobility Training, taught by CONNIE FitzgeraldPT at 6/19/2025 11:49  AM.  Learner: Patient  Readiness: Acceptance  Method: Explanation  Response: Verbalizes Understanding    Education Comments  No comments found.    Completion of this session, clinical decision making, and documentation performed under the supervision/direction of Suzanne Gayle PT, DPT.   Parth Payton, SPT             [1]   Patient Active Problem List  Diagnosis    Arthritis of hand, right    Basal cell carcinoma of skin    Chronic low back pain    CKD stage G3a/A1, GFR 45-59 and albumin creatinine ratio <30 mg/g (Multi)    Colon wall thickening    DJD (degenerative joint disease) of knee    Eczema    Edema    Hepatitis-C    Hyperlipidemia    Hypertension    Internal derangement of right knee    Primary osteoarthritis of right knee    Vitamin D insufficiency    Physical debility    History of total right knee replacement    Astigmatism of both eyes    Age-related nuclear cataract of both eyes    Dry eye syndrome of both eyes    Presbyopia    Myopia of left eye    Hypermetropia of right eye    Glaucoma suspect of both eyes    Osteoarthritis of lumbar spine    Left shoulder tendonitis    Obesity, morbid (Multi)    Bladder spasm    Pneumonia due to infectious organism, unspecified laterality, unspecified part of lung

## 2025-06-20 PROBLEM — J44.1 COPD EXACERBATION (MULTI): Status: ACTIVE | Noted: 2025-06-20

## 2025-06-20 PROBLEM — J96.01 ACUTE RESPIRATORY FAILURE WITH HYPOXIA: Status: ACTIVE | Noted: 2025-06-20

## 2025-06-20 LAB
ANION GAP SERPL CALC-SCNC: 14 MMOL/L (ref 10–20)
BUN SERPL-MCNC: 34 MG/DL (ref 6–23)
CALCIUM SERPL-MCNC: 9.4 MG/DL (ref 8.6–10.3)
CHLORIDE SERPL-SCNC: 102 MMOL/L (ref 98–107)
CO2 SERPL-SCNC: 23 MMOL/L (ref 21–32)
CREAT SERPL-MCNC: 1.24 MG/DL (ref 0.5–1.05)
EGFRCR SERPLBLD CKD-EPI 2021: 44 ML/MIN/1.73M*2
ERYTHROCYTE [DISTWIDTH] IN BLOOD BY AUTOMATED COUNT: 14.9 % (ref 11.5–14.5)
GLUCOSE SERPL-MCNC: 148 MG/DL (ref 74–99)
HCT VFR BLD AUTO: 41.8 % (ref 36–46)
HGB BLD-MCNC: 13.2 G/DL (ref 12–16)
MAGNESIUM SERPL-MCNC: 2.14 MG/DL (ref 1.6–2.4)
MCH RBC QN AUTO: 27.7 PG (ref 26–34)
MCHC RBC AUTO-ENTMCNC: 31.6 G/DL (ref 32–36)
MCV RBC AUTO: 88 FL (ref 80–100)
NRBC BLD-RTO: 0 /100 WBCS (ref 0–0)
PLATELET # BLD AUTO: 260 X10*3/UL (ref 150–450)
POTASSIUM SERPL-SCNC: 3.9 MMOL/L (ref 3.5–5.3)
RBC # BLD AUTO: 4.76 X10*6/UL (ref 4–5.2)
SODIUM SERPL-SCNC: 135 MMOL/L (ref 136–145)
WBC # BLD AUTO: 19.6 X10*3/UL (ref 4.4–11.3)

## 2025-06-20 PROCEDURE — 2500000005 HC RX 250 GENERAL PHARMACY W/O HCPCS

## 2025-06-20 PROCEDURE — 94640 AIRWAY INHALATION TREATMENT: CPT

## 2025-06-20 PROCEDURE — 83735 ASSAY OF MAGNESIUM: CPT | Performed by: INTERNAL MEDICINE

## 2025-06-20 PROCEDURE — 36415 COLL VENOUS BLD VENIPUNCTURE: CPT | Performed by: INTERNAL MEDICINE

## 2025-06-20 PROCEDURE — 2500000001 HC RX 250 WO HCPCS SELF ADMINISTERED DRUGS (ALT 637 FOR MEDICARE OP): Performed by: STUDENT IN AN ORGANIZED HEALTH CARE EDUCATION/TRAINING PROGRAM

## 2025-06-20 PROCEDURE — 2500000002 HC RX 250 W HCPCS SELF ADMINISTERED DRUGS (ALT 637 FOR MEDICARE OP, ALT 636 FOR OP/ED): Performed by: STUDENT IN AN ORGANIZED HEALTH CARE EDUCATION/TRAINING PROGRAM

## 2025-06-20 PROCEDURE — 99233 SBSQ HOSP IP/OBS HIGH 50: CPT | Performed by: INTERNAL MEDICINE

## 2025-06-20 PROCEDURE — 94760 N-INVAS EAR/PLS OXIMETRY 1: CPT

## 2025-06-20 PROCEDURE — 2500000004 HC RX 250 GENERAL PHARMACY W/ HCPCS (ALT 636 FOR OP/ED): Performed by: STUDENT IN AN ORGANIZED HEALTH CARE EDUCATION/TRAINING PROGRAM

## 2025-06-20 PROCEDURE — 1200000002 HC GENERAL ROOM WITH TELEMETRY DAILY

## 2025-06-20 PROCEDURE — 80048 BASIC METABOLIC PNL TOTAL CA: CPT | Performed by: INTERNAL MEDICINE

## 2025-06-20 PROCEDURE — 85027 COMPLETE CBC AUTOMATED: CPT | Performed by: INTERNAL MEDICINE

## 2025-06-20 RX ORDER — ACETAMINOPHEN 325 MG/1
650 TABLET ORAL EVERY 6 HOURS PRN
Status: DISCONTINUED | OUTPATIENT
Start: 2025-06-20 | End: 2025-06-21 | Stop reason: HOSPADM

## 2025-06-20 RX ADMIN — Medication 3 L/MIN: at 13:52

## 2025-06-20 RX ADMIN — OXYBUTYNIN CHLORIDE 5 MG: 5 TABLET ORAL at 21:05

## 2025-06-20 RX ADMIN — ATORVASTATIN CALCIUM 40 MG: 40 TABLET, FILM COATED ORAL at 08:36

## 2025-06-20 RX ADMIN — PREDNISONE 40 MG: 20 TABLET ORAL at 08:36

## 2025-06-20 RX ADMIN — ENOXAPARIN SODIUM 40 MG: 100 INJECTION SUBCUTANEOUS at 08:36

## 2025-06-20 RX ADMIN — Medication 3 L/MIN: at 08:39

## 2025-06-20 RX ADMIN — AMLODIPINE BESYLATE 5 MG: 5 TABLET ORAL at 08:36

## 2025-06-20 RX ADMIN — IPRATROPIUM BROMIDE AND ALBUTEROL SULFATE 3 ML: 2.5; .5 SOLUTION RESPIRATORY (INHALATION) at 13:52

## 2025-06-20 RX ADMIN — OXYBUTYNIN CHLORIDE 5 MG: 5 TABLET ORAL at 08:36

## 2025-06-20 RX ADMIN — ACETAMINOPHEN 650 MG: 325 TABLET ORAL at 05:33

## 2025-06-20 RX ADMIN — IPRATROPIUM BROMIDE AND ALBUTEROL SULFATE 3 ML: 2.5; .5 SOLUTION RESPIRATORY (INHALATION) at 01:33

## 2025-06-20 RX ADMIN — IPRATROPIUM BROMIDE AND ALBUTEROL SULFATE 3 ML: 2.5; .5 SOLUTION RESPIRATORY (INHALATION) at 20:33

## 2025-06-20 RX ADMIN — IPRATROPIUM BROMIDE AND ALBUTEROL SULFATE 3 ML: 2.5; .5 SOLUTION RESPIRATORY (INHALATION) at 08:39

## 2025-06-20 ASSESSMENT — PAIN SCALES - GENERAL
PAINLEVEL_OUTOF10: 0 - NO PAIN
PAINLEVEL_OUTOF10: 3
PAINLEVEL_OUTOF10: 0 - NO PAIN

## 2025-06-20 ASSESSMENT — COGNITIVE AND FUNCTIONAL STATUS - GENERAL
DAILY ACTIVITIY SCORE: 24
MOVING TO AND FROM BED TO CHAIR: A LITTLE
WALKING IN HOSPITAL ROOM: A LITTLE
CLIMB 3 TO 5 STEPS WITH RAILING: A LITTLE
MOBILITY SCORE: 20
STANDING UP FROM CHAIR USING ARMS: A LITTLE

## 2025-06-20 ASSESSMENT — PAIN - FUNCTIONAL ASSESSMENT
PAIN_FUNCTIONAL_ASSESSMENT: 0-10
PAIN_FUNCTIONAL_ASSESSMENT: 0-10

## 2025-06-20 ASSESSMENT — PAIN DESCRIPTION - LOCATION: LOCATION: HEAD

## 2025-06-20 NOTE — ASSESSMENT & PLAN NOTE
-Cr 1.24; baseline ~1.00 so had mild MARCY  -possibly 2/2 contrast she received with CT-PE protocol for above  -it is stable and mildly downtrending

## 2025-06-20 NOTE — CARE PLAN
Problem: Pain - Adult  Goal: Verbalizes/displays adequate comfort level or baseline comfort level  Outcome: Progressing     Problem: Safety - Adult  Goal: Free from fall injury  Outcome: Progressing     Problem: Discharge Planning  Goal: Discharge to home or other facility with appropriate resources  Outcome: Progressing     Problem: Chronic Conditions and Co-morbidities  Goal: Patient's chronic conditions and co-morbidity symptoms are monitored and maintained or improved  Outcome: Progressing     Problem: Nutrition  Goal: Nutrient intake appropriate for maintaining nutritional needs  Outcome: Progressing   The patient's goals for the shift include to be able to do ADL's without being short of breath    The clinical goals for the shift include pt will remain hemodynamically stable

## 2025-06-20 NOTE — NURSING NOTE
Date/Time SpO2 Medical Gas Therapy Medical Gas Delivery Method Oxygen L/min Patient is on During the Study Patient Activity During Study    06/20/25 1530 94 %  None (Room air)  --  -- At rest     06/20/25 1534 92 %  None (Room air)  --  -- Ambulating     06/20/25 1550 93 %  None (Room air)  --  -- --             Was a Home Oxygen Evaluation Performed? Yes  Based on the Home Oxygen Evaluation, Does the Patient Qualify for Home Oxygen Therapy? No - This patient does not qualify for home oxygen. They have O2 saturations greater than or equal to 89% while ambulating.

## 2025-06-20 NOTE — NURSING NOTE
Homegoing O2 evaluation complete. P ox on NC 3lpm 96-97%. P ox in RA 94%. P ox while ambulating in RA 90-94%.

## 2025-06-20 NOTE — PROGRESS NOTES
Dora Guillermo is a 80 y.o. female on day 2 of admission presenting with COPD exacerbation (Multi).      Subjective   Patient continues to feel short of breath.  Yesterday patient was sitting on bedside and her oxygen removed and desaturated to 84%.  Awaiting results of ambulatory desat study today as well.  Patient feels slightly better but is still very short of breath.  She gets short of breath with simple conversation.  She did walk to the bathroom and back and was not as short of breath but states that she felt very unsteady on her feet walking with a walker.  She denies much cough.  She denies fever or chills.       Objective     Last Recorded Vitals  /75 (BP Location: Left arm, Patient Position: Lying)   Pulse 75   Temp 36 °C (96.8 °F) (Temporal)   Resp 18   Wt 109 kg (240 lb 4.8 oz)   SpO2 98%   Intake/Output last 3 Shifts:    Intake/Output Summary (Last 24 hours) at 6/20/2025 1233  Last data filed at 6/20/2025 0900  Gross per 24 hour   Intake 499.17 ml   Output 1150 ml   Net -650.83 ml       Admission Weight  Weight: 109 kg (240 lb) (06/18/25 1356)    Daily Weight  06/18/25 : 109 kg (240 lb 4.8 oz)    Image Results  CT angio chest for pulmonary embolism  Narrative: STUDY:  CT Angiogram of the Chest; 06/18/2025, 6:22 PM.  INDICATION:  Hypoxia.  COMPARISON:  CT chest: 10/10/22.  ACCESSION NUMBER(S):  [id number]  ORDERING CLINICIAN:  CARLENE SHIPLEY  TECHNIQUE:  CTA of the chest was performed with intravenous contrast.   Images are reviewed and processed at a workstation according to the CT  angiogram protocol with 3-D and/or MIP post processing imaging  generated.  Omnipaque 350 60 mL was administered intravenously.   Automated mA/kV exposure control was utilized and patient examination  was performed in strict accordance with principles of ALARA.  FINDINGS:  Pulmonary arteries are adequately opacified without acute or chronic  filling defects.  The thoracic aorta is normal in course and  caliber  without dissection or aneurysm.  The heart is normal in size without pericardial effusion.  Thoracic  lymph nodes are not enlarged.  Ascending aorta measures 3.7 cm and the  descending aorta measures 2.1 cm.  There is no pleural effusion, pleural thickening, or pneumothorax.   The airways are patent.  Lungs are clear without consolidation, interstitial disease, or  suspicious nodules.  There is calcified granuloma right upper lobe.   There is mild basilar atelectasis.  Lungs demonstrate mosaic  attenuation.  Upper abdomen demonstrates no acute pathology.  Liver demonstrates  fatty morphology.  There are no acute fractures.  No suspicious bony lesions.  There is  rightward convexity of the thoracic spine.  There are disc space  narrowing and endplate degenerative changes in the thoracic spine.  Impression: No pulmonary artery emboli.  Mild basilar atelectasis.  No other acute cardiopulmonary disease.  Prior granulomatous process.  Signed by Yasir Cruz,   ECG 12 lead  Sinus rhythm with 1st degree AV block  Left axis deviation  Low voltage QRS  Possible Anterolateral infarct (cited on or before 06-MAR-2015)  Abnormal ECG  When compared with ECG of 13-APR-2023 11:57,  QT has shortened  XR chest 1 view  Narrative: Interpreted By:  Umu Wilson,   STUDY:  XR CHEST 1 VIEW 6/18/2025 2:27 pm      INDICATION:  Signs/Symptoms:r/o pneumonia or pneumothorax. Wheezing and productive  cough      COMPARISON:  08/15/2016      ACCESSION NUMBER(S):  [id number]      ORDERING CLINICIAN:  LETY POOLE      TECHNIQUE:  AP erect view of the chest at bedside      FINDINGS:  The cardiac size is within normal limits with a small amount of  calcified plaque observed within the aortic knob.      There are several discoid areas of atelectasis within both lower lung  fields. The lungs are otherwise clear with a small stable calcified  granuloma noted in the right upper lobe. No pleural abnormality is  seen.      Impression:  Several discoid areas of atelectasis in each lower lung zone.      Signed by: Umu Wilson 6/18/2025 2:37 PM  Dictation workstation:   [id number]      Physical Exam  Constitutional:       General: She is not in acute distress.     Appearance: Normal appearance.   HENT:      Head: Normocephalic and atraumatic.      Mouth/Throat:      Mouth: Mucous membranes are moist.   Eyes:      Extraocular Movements: Extraocular movements intact.      Conjunctiva/sclera: Conjunctivae normal.   Cardiovascular:      Rate and Rhythm: Normal rate and regular rhythm.      Heart sounds: Normal heart sounds.   Pulmonary:      Effort: Pulmonary effort is normal.      Breath sounds: Rhonchi present. No wheezing or rales.      Comments: Conversational dyspnea noted during interview  Abdominal:      General: Abdomen is flat. Bowel sounds are normal.      Palpations: Abdomen is soft.   Musculoskeletal:         General: No swelling or tenderness. Normal range of motion.      Cervical back: Normal range of motion and neck supple.   Skin:     General: Skin is warm and dry.      Findings: No rash.   Neurological:      General: No focal deficit present.      Mental Status: She is alert and oriented to person, place, and time.      Cranial Nerves: No cranial nerve deficit.      Sensory: No sensory deficit.   Psychiatric:         Mood and Affect: Mood normal.         Behavior: Behavior normal.         Relevant Results  Scheduled medications  Scheduled Medications[1]  Continuous medications  Continuous Medications[2]  PRN medications  PRN Medications[3]    Results for orders placed or performed during the hospital encounter of 06/18/25 (from the past 24 hours)   Basic metabolic panel   Result Value Ref Range    Glucose 208 (H) 74 - 99 mg/dL    Sodium 135 (L) 136 - 145 mmol/L    Potassium 3.3 (L) 3.5 - 5.3 mmol/L    Chloride 100 98 - 107 mmol/L    Bicarbonate 23 21 - 32 mmol/L    Anion Gap 15 10 - 20 mmol/L    Urea Nitrogen 30 (H) 6 - 23 mg/dL     Creatinine 1.25 (H) 0.50 - 1.05 mg/dL    eGFR 44 (L) >60 mL/min/1.73m*2    Calcium 9.5 8.6 - 10.3 mg/dL   CBC   Result Value Ref Range    WBC 19.6 (H) 4.4 - 11.3 x10*3/uL    nRBC 0.0 0.0 - 0.0 /100 WBCs    RBC 4.76 4.00 - 5.20 x10*6/uL    Hemoglobin 13.2 12.0 - 16.0 g/dL    Hematocrit 41.8 36.0 - 46.0 %    MCV 88 80 - 100 fL    MCH 27.7 26.0 - 34.0 pg    MCHC 31.6 (L) 32.0 - 36.0 g/dL    RDW 14.9 (H) 11.5 - 14.5 %    Platelets 260 150 - 450 x10*3/uL   Magnesium   Result Value Ref Range    Magnesium 2.14 1.60 - 2.40 mg/dL   Basic Metabolic Panel   Result Value Ref Range    Glucose 148 (H) 74 - 99 mg/dL    Sodium 135 (L) 136 - 145 mmol/L    Potassium 3.9 3.5 - 5.3 mmol/L    Chloride 102 98 - 107 mmol/L    Bicarbonate 23 21 - 32 mmol/L    Anion Gap 14 10 - 20 mmol/L    Urea Nitrogen 34 (H) 6 - 23 mg/dL    Creatinine 1.24 (H) 0.50 - 1.05 mg/dL    eGFR 44 (L) >60 mL/min/1.73m*2    Calcium 9.4 8.6 - 10.3 mg/dL       CT angio chest for pulmonary embolism  Result Date: 6/18/2025  STUDY: CT Angiogram of the Chest; 06/18/2025, 6:22 PM. INDICATION: Hypoxia. COMPARISON: CT chest: 10/10/22. ACCESSION NUMBER(S): [id number] ORDERING CLINICIAN: CARLENE SHIPLEY TECHNIQUE:  CTA of the chest was performed with intravenous contrast. Images are reviewed and processed at a workstation according to the CT angiogram protocol with 3-D and/or MIP post processing imaging generated.  Omnipaque 350 60 mL was administered intravenously. Automated mA/kV exposure control was utilized and patient examination was performed in strict accordance with principles of ALARA. FINDINGS: Pulmonary arteries are adequately opacified without acute or chronic filling defects.  The thoracic aorta is normal in course and caliber without dissection or aneurysm. The heart is normal in size without pericardial effusion.  Thoracic lymph nodes are not enlarged.  Ascending aorta measures 3.7 cm and the descending aorta measures 2.1 cm. There is no pleural effusion,  pleural thickening, or pneumothorax. The airways are patent. Lungs are clear without consolidation, interstitial disease, or suspicious nodules.  There is calcified granuloma right upper lobe. There is mild basilar atelectasis.  Lungs demonstrate mosaic attenuation. Upper abdomen demonstrates no acute pathology.  Liver demonstrates fatty morphology. There are no acute fractures.  No suspicious bony lesions.  There is rightward convexity of the thoracic spine.  There are disc space narrowing and endplate degenerative changes in the thoracic spine.    No pulmonary artery emboli. Mild basilar atelectasis.  No other acute cardiopulmonary disease. Prior granulomatous process. Signed by Yasir Cruz, DO    ECG 12 lead  Result Date: 6/18/2025  Sinus rhythm with 1st degree AV block Left axis deviation Low voltage QRS Possible Anterolateral infarct (cited on or before 06-MAR-2015) Abnormal ECG When compared with ECG of 13-APR-2023 11:57, QT has shortened    XR chest 1 view  Result Date: 6/18/2025  Interpreted By:  Umu Wilson, STUDY: XR CHEST 1 VIEW 6/18/2025 2:27 pm   INDICATION: Signs/Symptoms:r/o pneumonia or pneumothorax. Wheezing and productive cough   COMPARISON: 08/15/2016   ACCESSION NUMBER(S): [id number]   ORDERING CLINICIAN: LETY POOLE   TECHNIQUE: AP erect view of the chest at bedside   FINDINGS: The cardiac size is within normal limits with a small amount of calcified plaque observed within the aortic knob.   There are several discoid areas of atelectasis within both lower lung fields. The lungs are otherwise clear with a small stable calcified granuloma noted in the right upper lobe. No pleural abnormality is seen.       Several discoid areas of atelectasis in each lower lung zone.   Signed by: Umu Wilson 6/18/2025 2:37 PM Dictation workstation:   [id number]    Assessment & Plan  COPD exacerbation (Multi)  Acute respiratory failure with hypoxia  -currently requiring 3L NC to maintain SpO2 > 90% as  evidence of acute respiratory failure with hypoxia  - Wean O2 as tolerated; patient educated today on incentive spirometry  - Patient not coughing in significant amount; CT PE protocol was negative for PE and also negative for an obvious pneumonia  - Sputum culture was ordered and is pending at this time but no suspicion for pneumonia at this time so no antibiotics will be ordered  - Continue steroids and frequent breathing treatments  Obesity, morbid (Multi)  -BMI 39  -adds morbidity to above;  on weight loss  Physical debility  -PT/OT  CKD stage G3a/A1, GFR 45-59 and albumin creatinine ratio <30 mg/g (Multi)  -Cr 1.24; baseline ~1.00 so had mild MARCY  -possibly 2/2 contrast she received with CT-PE protocol for above  -it is stable and mildly downtrending  Hyperlipidemia  -stable  -continue atorvastatin 40  Hypertension  -BP stable  -continue current meds      Code: full  DVT PPX: lovenox  GI PPX: not indicated  Diet: regular    Disposition: Possible discharge over the weekend if she is weaned to room air    Jerardo Gonzales MD           [1] amLODIPine, 5 mg, oral, Daily  atorvastatin, 40 mg, oral, Daily  [Held by provider] dapagliflozin propanediol, 5 mg, oral, Daily  enoxaparin, 40 mg, subcutaneous, q24h  ipratropium-albuteroL, 3 mL, nebulization, q6h  [Held by provider] lisinopril, 40 mg, oral, Daily  oxyBUTYnin, 5 mg, oral, BID  predniSONE, 40 mg, oral, Daily  [2]    [3] PRN medications: acetaminophen, ipratropium-albuteroL, oxygen

## 2025-06-20 NOTE — PROGRESS NOTES
Occupational Therapy    Occupational Therapy    Evaluation    Patient Name: Dora Guillermo  MRN: [id number]  Today's Date: 6/19/2025  Time Calculation  Start Time: 1147  Stop Time: 1207  Time Calculation (min): 20 min  3131/3131-A    Assessment  IP OT Assessment  OT Assessment: Pt pleasant and cooperative. Pt agrees she is close to baseline level of function, which is independent. Recommend home with [facility] OT to ensure safety with ADL's at home  Prognosis: Good  End of Session Communication: Bedside nurse  End of Session Patient Position: Up in chair, Alarm on    Plan:  Treatment Interventions: ADL retraining, Functional transfer training, UE strengthening/ROM, Neuromuscular reeducation  OT Frequency: 3 times per week  OT Discharge Recommendations: Low intensity level of continued care ([facility] OT)  Equipment Recommended upon Discharge: Wheeled walker (Continue using rollator as needed outdoors)  OT Recommended Transfer Status: Stand by assist  OT - OK to Discharge: Yes (to next level of care)    Subjective     Current Problem:  1. Pneumonia due to infectious organism, unspecified laterality, unspecified part of lung        2. Shortness of breath            General:  General  Reason for Referral: ADL's  Referred By: Ludwin Cortes  Prior to Session Communication: Bedside nurse  Patient Position Received: Alarm on, Bed, 3 rail up (Pt seated at EOB)    Per EMR:   80-year-old female with medical history significant for hypertension, hyperlipidemia, CKD 3, obesity presenting to the hospital shortness of breath.  Patient reports a weeklong history of productive cough with yellow sputum and gradually worsening shortness of breath. She was on amoxicillin over the past week without major improvement.  no prior tobacco or smoking history.  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  Has never had any significant lung issues in the past.  Is up-to-date with her pneumonia  and RSV shots.     In the emergency department, patient initially was hypertensive.  She was hypoxic and required 6 L O2 nasal cannula. lab work fairly unremarkable.  COVID/flu negative. CTA chest without PE and noted mild bibasilar atelectasis.    Precautions:  Medical Precautions: Fall precautions, Oxygen therapy device and L/min (2LNC)  Precautions Comment: bed/chair alarm    Pain:  Pain Assessment  Pain Assessment: 0-10  0-10 (Numeric) Pain Score: 0 - No pain    Objective     Cognition:  Overall Cognitive Status: Within Functional Limits  Orientation Level: Oriented X4       Home Living:  Home Living Comments: Pt lives with her  in an in-law suite attached to her daughters house. Pt has a ramp to enter. Pt has a walk in shower with grab bar and shower chair. Pt also has a raised toilet.     Prior Function:  Prior Function Comments: Pt was completing ADL's independently. Pt was still driving and able to complete shopping. Pt  typically does not use DME when ambulating in the house. Pt has a rollator to use for longer distances. When shopping, pt pushes a cart or uses the motorized cart.Pt admits to a fall about 1 week ago, while pulling weeds. Pt's daughter assist pt up with increased time and effort.    ADL:  Eating Assistance: Independent  Grooming Assistance: Stand by  Bathing Assistance: Stand by (CGA)  UE Dressing Assistance: Independent  LE Dressing Assistance: Stand by  Toileting Assistance with Device: Stand by    Activity Tolerance:  Endurance: Tolerates 10 - 20 min exercise with multiple rests    Bed Mobility/Transfers:   Supine-sit: pt was up in bedside chair    Sit-stand: SBA  Stand-sit: SBA    Ambulation/Gait Training:  Pt ambulated in room using wheeled walker with SBA     Sitting Balance:  Static Sitting Balance  Static Sitting-Comment/Number of Minutes: good  Dynamic Sitting Balance  Dynamic Sitting-Comments: good    Standing Balance:  Static Standing Balance  Static Standing-Comment/Number  of Minutes: fair+  Dynamic Standing Balance  Dynamic Standing-Comments: fair    Sensation:  Light Touch: No apparent deficits    Strength:  Strength Comments: B UE's WFL      Extremities: RUE   RUE : Within Functional Limits and LUE   LUE: Within Functional Limits    Outcome Measures: [facility] Daily Activity  Putting on and taking off regular lower body clothing: A little  Bathing (including washing, rinsing, drying): A little  Putting on and taking off regular upper body clothing: None  Toileting, which includes using toilet, bedpan or urinal: None  Taking care of personal grooming such as brushing teeth: None  Eating Meals: None  Daily Activity - Total Score: 22                       EDUCATION:  Education  Individual(s) Educated: Patient  Education Provided:  (safety)  Education Documentation  No documentation found.  Education Comments  No comments found.        Goals:   Encounter Problems       Encounter Problems (Active)       OT Goals       OT Goal 1 (Progressing)       Start:  06/19/25    Expected End:  07/03/25       Pt with complete all transfers safely independently           OT Goal 2 (Progressing)       Start:  06/19/25    Expected End:  07/03/25       Pt will complete grooming ADL's with modified independence and good stand balance            OT Goal 3 (Progressing)       Start:  06/19/25    Expected End:  07/03/25       Pt will complete LB dressing with modified independence using adaptive device as needed                Treatment: Pt was up in bedside chair. Pt completed all transfers with SBA. Pt ambulated in room with SBA using wheeled walker. Pt stood at sink with fair+ stand balance to complete grooming ADL's. Pt returned to and remained in bedside chair with chair alarm on and call light within reach.

## 2025-06-20 NOTE — ASSESSMENT & PLAN NOTE
-currently requiring 3L NC to maintain SpO2 > 90% as evidence of acute respiratory failure with hypoxia  - Wean O2 as tolerated; patient educated today on incentive spirometry  - Patient not coughing in significant amount; CT PE protocol was negative for PE and also negative for an obvious pneumonia  - Sputum culture was ordered and is pending at this time but no suspicion for pneumonia at this time so no antibiotics will be ordered  - Continue steroids and frequent breathing treatments

## 2025-06-21 VITALS
TEMPERATURE: 96.8 F | WEIGHT: 240.3 LBS | RESPIRATION RATE: 18 BRPM | BODY MASS INDEX: 38.62 KG/M2 | OXYGEN SATURATION: 91 % | DIASTOLIC BLOOD PRESSURE: 75 MMHG | SYSTOLIC BLOOD PRESSURE: 146 MMHG | HEIGHT: 66 IN | HEART RATE: 109 BPM

## 2025-06-21 LAB
ANION GAP SERPL CALC-SCNC: 13 MMOL/L (ref 10–20)
BUN SERPL-MCNC: 30 MG/DL (ref 6–23)
CALCIUM SERPL-MCNC: 9.3 MG/DL (ref 8.6–10.3)
CHLORIDE SERPL-SCNC: 104 MMOL/L (ref 98–107)
CO2 SERPL-SCNC: 24 MMOL/L (ref 21–32)
CREAT SERPL-MCNC: 0.86 MG/DL (ref 0.5–1.05)
EGFRCR SERPLBLD CKD-EPI 2021: 69 ML/MIN/1.73M*2
ERYTHROCYTE [DISTWIDTH] IN BLOOD BY AUTOMATED COUNT: 14.9 % (ref 11.5–14.5)
GLUCOSE SERPL-MCNC: 123 MG/DL (ref 74–99)
HCT VFR BLD AUTO: 41.8 % (ref 36–46)
HGB BLD-MCNC: 13.3 G/DL (ref 12–16)
MAGNESIUM SERPL-MCNC: 2.22 MG/DL (ref 1.6–2.4)
MCH RBC QN AUTO: 28.1 PG (ref 26–34)
MCHC RBC AUTO-ENTMCNC: 31.8 G/DL (ref 32–36)
MCV RBC AUTO: 88 FL (ref 80–100)
NRBC BLD-RTO: 0 /100 WBCS (ref 0–0)
PLATELET # BLD AUTO: 240 X10*3/UL (ref 150–450)
POTASSIUM SERPL-SCNC: 4.3 MMOL/L (ref 3.5–5.3)
RBC # BLD AUTO: 4.74 X10*6/UL (ref 4–5.2)
SODIUM SERPL-SCNC: 137 MMOL/L (ref 136–145)
WBC # BLD AUTO: 14.9 X10*3/UL (ref 4.4–11.3)

## 2025-06-21 PROCEDURE — 83735 ASSAY OF MAGNESIUM: CPT | Performed by: INTERNAL MEDICINE

## 2025-06-21 PROCEDURE — 2500000001 HC RX 250 WO HCPCS SELF ADMINISTERED DRUGS (ALT 637 FOR MEDICARE OP): Performed by: STUDENT IN AN ORGANIZED HEALTH CARE EDUCATION/TRAINING PROGRAM

## 2025-06-21 PROCEDURE — 85027 COMPLETE CBC AUTOMATED: CPT | Performed by: INTERNAL MEDICINE

## 2025-06-21 PROCEDURE — 80048 BASIC METABOLIC PNL TOTAL CA: CPT | Performed by: INTERNAL MEDICINE

## 2025-06-21 PROCEDURE — 99239 HOSP IP/OBS DSCHRG MGMT >30: CPT | Performed by: INTERNAL MEDICINE

## 2025-06-21 PROCEDURE — 2500000004 HC RX 250 GENERAL PHARMACY W/ HCPCS (ALT 636 FOR OP/ED): Performed by: STUDENT IN AN ORGANIZED HEALTH CARE EDUCATION/TRAINING PROGRAM

## 2025-06-21 PROCEDURE — 94640 AIRWAY INHALATION TREATMENT: CPT

## 2025-06-21 PROCEDURE — 36415 COLL VENOUS BLD VENIPUNCTURE: CPT | Performed by: INTERNAL MEDICINE

## 2025-06-21 PROCEDURE — 2500000002 HC RX 250 W HCPCS SELF ADMINISTERED DRUGS (ALT 637 FOR MEDICARE OP, ALT 636 FOR OP/ED): Performed by: STUDENT IN AN ORGANIZED HEALTH CARE EDUCATION/TRAINING PROGRAM

## 2025-06-21 RX ORDER — IPRATROPIUM BROMIDE AND ALBUTEROL SULFATE 2.5; .5 MG/3ML; MG/3ML
3 SOLUTION RESPIRATORY (INHALATION)
Qty: 300 ML | Refills: 0 | Status: SHIPPED | OUTPATIENT
Start: 2025-06-21

## 2025-06-21 RX ORDER — PREDNISONE 10 MG/1
TABLET ORAL
Qty: 30 TABLET | Refills: 0 | Status: SHIPPED | OUTPATIENT
Start: 2025-06-22 | End: 2025-07-04

## 2025-06-21 RX ADMIN — ENOXAPARIN SODIUM 40 MG: 100 INJECTION SUBCUTANEOUS at 09:06

## 2025-06-21 RX ADMIN — AMLODIPINE BESYLATE 5 MG: 5 TABLET ORAL at 09:06

## 2025-06-21 RX ADMIN — PREDNISONE 40 MG: 20 TABLET ORAL at 09:06

## 2025-06-21 RX ADMIN — ATORVASTATIN CALCIUM 40 MG: 40 TABLET, FILM COATED ORAL at 09:06

## 2025-06-21 RX ADMIN — IPRATROPIUM BROMIDE AND ALBUTEROL SULFATE 3 ML: 2.5; .5 SOLUTION RESPIRATORY (INHALATION) at 00:30

## 2025-06-21 RX ADMIN — IPRATROPIUM BROMIDE AND ALBUTEROL SULFATE 3 ML: 2.5; .5 SOLUTION RESPIRATORY (INHALATION) at 13:31

## 2025-06-21 ASSESSMENT — PAIN SCALES - GENERAL: PAINLEVEL_OUTOF10: 0 - NO PAIN

## 2025-06-21 ASSESSMENT — COGNITIVE AND FUNCTIONAL STATUS - GENERAL
CLIMB 3 TO 5 STEPS WITH RAILING: A LITTLE
DAILY ACTIVITIY SCORE: 24
WALKING IN HOSPITAL ROOM: A LITTLE
MOVING TO AND FROM BED TO CHAIR: A LITTLE
MOBILITY SCORE: 20
STANDING UP FROM CHAIR USING ARMS: A LITTLE

## 2025-06-21 NOTE — NURSING NOTE
IV discontinued. Discharge paperwork given. Patient given wheelchair to see  in hospital and discharge home with family

## 2025-06-21 NOTE — DISCHARGE SUMMARY
Discharge Diagnosis  COPD exacerbation (Multi)       Issues Requiring Follow-Up  Follow up with PCP and pulmonology    Discharge Meds     Medication List      START taking these medications     ipratropium-albuteroL 0.5-2.5 mg/3 mL nebulizer solution; Commonly known   as: Duo-Neb; Take 3 mL by nebulization 4 times a day.   predniSONE 10 mg tablet; Commonly known as: Deltasone; Take 4 tablets   (40 mg) by mouth once daily for 3 days, THEN 3 tablets (30 mg) once daily   for 3 days, THEN 2 tablets (20 mg) once daily for 3 days, THEN 1 tablet   (10 mg) once daily for 3 days.; Start taking on: June 22, 2025     CONTINUE taking these medications     amLODIPine 5 mg tablet; Commonly known as: Norvasc; TAKE 1 TABLET DAILY   atorvastatin 40 mg tablet; Commonly known as: Lipitor; TAKE 1 TABLET   ONCE DAILY   calcium carbonate 600 mg calcium (1,500 mg) tablet   Farxiga 5 mg tablet; Generic drug: dapagliflozin propanediol; Take 1   tablet (5 mg) by mouth once daily.   lisinopril 40 mg tablet; TAKE 1 TABLET DAILY   Myrbetriq 25 mg tablet extended release 24 hr; Generic drug: mirabegron;   Take 1 tablet (25 mg) by mouth once daily.   naproxen 250 mg tablet; Commonly known as: Naprosyn; Take 2 tablets (500   mg) by mouth 2 times daily (morning and late afternoon).   Sentry Senior 0.4 mg-300 mcg- 250 mcg; Generic drug: multivitamin with   minerals iron-free   Vitamin D3 50 mcg (2,000 units) tablet; Generic drug: cholecalciferol   walker misc; Use as directed.     STOP taking these medications     fluticasone 50 mcg/actuation nasal spray; Commonly known as: Flonase       Test Results Pending At Discharge  Pending Labs       No current pending labs.            Hospital Course   History of Present Illness   80-year-old female with medical history significant for hypertension, hyperlipidemia, CKD 3, obesity presenting to the hospital shortness of breath.  Patient reports a weeklong history of productive cough with yellow sputum and  gradually worsening shortness of breath. She was on amoxicillin over the past week without major improvement.  no prior tobacco or smoking history.  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  Has never had any significant lung issues in the past.  Is up-to-date with her pneumonia and RSV shots.     In the emergency department, patient initially was hypertensive.  She was hypoxic and required 6 L O2 nasal cannula. lab work fairly unremarkable.  COVID/flu negative. CTA chest without PE and noted mild bibasilar atelectasis.    Hospital course: Patient was admitted to the hospital shortness of breath and found to have acute respiratory failure with hypoxia due to COPD exacerbation.  Patient was requiring up to 6 L nasal cannula to maintain SpO2 greater than 90% as evidence of acute respiratory failure with hypoxia.  She was eventually weaned elevated room air and did not require oxygen even with ambulation.  Patient was kept on steroids and frequent breathing treatments.  She was not given any further antibiotics and did not have a significant productive cough.  Patient was discharged on a steroid taper.  She was also set up with a nebulizer and DuoNeb treatments.  She will follow-up in the outpatient setting with pulmonology.  While here patient did have a mild acute kidney injury superimposed on her chronic kidney disease stage III.  Her creatinine baseline is 0.85 and it up trended to about 1.25.  This was presumed to be secondary to contrast she received when she got a CT pulmonary embolism protocol and the injury did fully resolve with holding of her lisinopril and SGLT2 inhibitor.  These were resumed on discharge.  Patient was discharged home in stable condition.    Discharge time greater than 35 minutes. Greater than 50% of time spent on counseling patient, coordination of care, medication reconciliation, transmitting medications to pharmacy and  clarifying plan of care with nursing staff and case management.    Pertinent Physical Exam At Time of Discharge  Physical Exam  Constitutional:       General: She is not in acute distress.     Appearance: Normal appearance.   HENT:      Head: Normocephalic and atraumatic.      Mouth/Throat:      Mouth: Mucous membranes are moist.   Eyes:      Extraocular Movements: Extraocular movements intact.      Conjunctiva/sclera: Conjunctivae normal.   Cardiovascular:      Rate and Rhythm: Normal rate and regular rhythm.      Heart sounds: Normal heart sounds.   Pulmonary:      Effort: Pulmonary effort is normal.      Breath sounds: Normal breath sounds. No wheezing, rhonchi or rales.      Comments: Much improved exam; few vesicular sounds on inspiration but no wheezing, or significant ronchi and no rales  Abdominal:      General: Abdomen is flat. Bowel sounds are normal.      Palpations: Abdomen is soft.   Musculoskeletal:         General: No swelling or tenderness. Normal range of motion.      Cervical back: Normal range of motion and neck supple.   Skin:     General: Skin is warm and dry.      Findings: No rash.   Neurological:      General: No focal deficit present.      Mental Status: She is alert and oriented to person, place, and time.      Cranial Nerves: No cranial nerve deficit.      Sensory: No sensory deficit.   Psychiatric:         Mood and Affect: Mood normal.         Behavior: Behavior normal.         Outpatient Follow-Up  Future Appointments   Date Time Provider Department Center   8/12/2025  8:00 AM Bassam Zafar MD [id number] [place]   4/30/2026  8:00 AM Brian Osborne MD [id number] [place]         Jerardo Gonzales MD

## 2025-06-21 NOTE — CARE PLAN
Problem: Discharge Planning  Goal: Discharge to home or other facility with appropriate resources  Outcome: Progressing     Problem: Chronic Conditions and Co-morbidities  Goal: Patient's chronic conditions and co-morbidity symptoms are monitored and maintained or improved  Outcome: Progressing     Problem: Skin  Goal: Decreased wound size/increased tissue granulation at next dressing change  Outcome: Progressing  Flowsheets (Taken 6/21/2025 1515)  Decreased wound size/increased tissue granulation at next dressing change:   Promote sleep for wound healing   Protective dressings over bony prominences  Goal: Participates in plan/prevention/treatment measures  Outcome: Progressing  Flowsheets (Taken 6/21/2025 1515)  Participates in plan/prevention/treatment measures:   Discuss with provider PT/OT consult   Elevate heels  Goal: Prevent/manage excess moisture  Outcome: Progressing  Flowsheets (Taken 6/21/2025 1515)  Prevent/manage excess moisture:   Moisturize dry skin   Monitor for/manage infection if present  Goal: Prevent/minimize sheer/friction injuries  Outcome: Progressing  Flowsheets (Taken 6/21/2025 1515)  Prevent/minimize sheer/friction injuries:   Increase activity/out of bed for meals   Turn/reposition every 2 hours/use positioning/transfer devices  Goal: Promote/optimize nutrition  Outcome: Progressing  Flowsheets (Taken 6/21/2025 1515)  Promote/optimize nutrition:   Assist with feeding   Offer water/supplements/favorite foods  Goal: Promote skin healing  Outcome: Progressing  Flowsheets (Taken 6/21/2025 1515)  Promote skin healing:   Protective dressings over bony prominences   Rotate device position/do not position patient on device   Turn/reposition every 2 hours/use positioning/transfer devices   The patient's goals for the shift include to be able to do ADL's without being short of breath    The clinical goals for the shift include completed

## 2025-06-21 NOTE — CARE PLAN
The patient's goals for the shift include to be able to do ADL's without being short of breath    The clinical goals for the shift include remain hemodynamically stable    Over the shift, the patient did make progress toward the following goals.

## 2025-06-23 ENCOUNTER — PATIENT OUTREACH (OUTPATIENT)
Dept: PRIMARY CARE | Facility: CLINIC | Age: 80
End: 2025-06-23
Payer: COMMERCIAL

## 2025-06-23 ENCOUNTER — TELEPHONE (OUTPATIENT)
Dept: PRIMARY CARE | Facility: CLINIC | Age: 80
End: 2025-06-23
Payer: COMMERCIAL

## 2025-06-23 NOTE — PROGRESS NOTES
Patient was discharged from the hospital over the weekend with orders to start Duo-Neb nebulizer but she has not received the nebulizer machine.   She has tried contacting the hospital about this and she has just been getting the run around from both the hospital and her insurance. She was told it would be there by Sunday but she still has not received the equiment or a call from any DME company.    Is this something your office can provide or are you able to order for patient?

## 2025-06-23 NOTE — PROGRESS NOTES
Discharge Facility: [facility]  Discharge Diagnosis: COPD exacerbation   Admission Date: 6/18/25  Discharge Date: 6/21/25    PCP Appointment Date: 6/25/25  Specialist Appointment Date:   - Sawyer Rosales MD (Pulmonary Disease)   Hospital Encounter and Summary Linked: Yes  ED to Hosp-Admission (Discharged) with Jerardo Gonzales MD (06/18/2025)   See discharge assessment below for further details    Wrap Up  Wrap Up Additional Comments: Pt states she is doing pretty good; much better than the day she got home. Pt reports her breathing is pretty good in the house, but it was a little worse when she went outside. Reviewed medication changes. Per pt she received the medications but not the nebulizer; she has contacted the hospital and they still haven't received the machine and are not sure what company is supposed to deliver it. Messages sent to PCP to assist regarding nebulizer machine order. Scheduled follow up with PCP. Denies further questions/concerns at this time. (6/23/2025  3:42 PM)    Medications  Medications reviewed with patient/caregiver?: Yes (new/changes only) (6/23/2025  3:42 PM)  Is the patient having any side effects they believe may be caused by any medication additions or changes?: No (6/23/2025  3:42 PM)  Does the patient have all medications ordered at discharge?: Yes (6/23/2025  3:42 PM)  Care Management Interventions: No intervention needed (6/23/2025  3:42 PM)  Prescription Comments: START: Duo-neb, prednisone  STOP: flonase (6/23/2025  3:42 PM)  Is the patient taking all medications as directed (includes completed medication regime)?: Yes (6/23/2025  3:42 PM)  Care Management Interventions: Provided patient education (6/23/2025  3:42 PM)    Appointments  Does the patient have a primary care provider?: Yes (6/23/2025  3:42 PM)  Care Management Interventions: Verified appointment date/time/provider (6/23/2025  3:42 PM)  Has the patient kept scheduled appointments due by today?: Yes (6/23/2025   3:42 PM)  Care Management Interventions: Advised patient to keep appointment (6/23/2025  3:42 PM)    Self Management  Has home health visited the patient within 72 hours of discharge?: Not applicable (6/23/2025  3:42 PM)  What Durable Medical Equipment (DME) was ordered?: Nebulizer (6/23/2025  3:42 PM)  Has all Durable Medical Equipment (DME) been delivered?: No (6/23/2025  3:42 PM)  DME Interventions: Other (Comment) (contacted PCP for order) (6/23/2025  3:42 PM)    Patient Teaching  Does the patient have access to their discharge instructions?: Yes (6/23/2025  3:42 PM)  Care Management Interventions: Reviewed instructions with patient (6/23/2025  3:42 PM)  What is the patient's perception of their health status since discharge?: Improving (6/23/2025  3:42 PM)  Is the patient/caregiver able to teach back the hierarchy of who to call/visit for symptoms/problems? PCP, Specialist, Home Health nurse, Urgent Care, ED, 911: Yes (6/23/2025  3:42 PM)

## 2025-06-25 ENCOUNTER — OFFICE VISIT (OUTPATIENT)
Dept: PRIMARY CARE | Facility: CLINIC | Age: 80
End: 2025-06-25
Payer: COMMERCIAL

## 2025-06-25 VITALS
BODY MASS INDEX: 37.61 KG/M2 | HEART RATE: 59 BPM | HEIGHT: 66 IN | RESPIRATION RATE: 16 BRPM | OXYGEN SATURATION: 95 % | WEIGHT: 234 LBS | DIASTOLIC BLOOD PRESSURE: 72 MMHG | SYSTOLIC BLOOD PRESSURE: 140 MMHG | TEMPERATURE: 97.4 F

## 2025-06-25 DIAGNOSIS — E78.2 MIXED HYPERLIPIDEMIA: ICD-10-CM

## 2025-06-25 DIAGNOSIS — M47.26 OSTEOARTHRITIS OF SPINE WITH RADICULOPATHY, LUMBAR REGION: ICD-10-CM

## 2025-06-25 DIAGNOSIS — E66.01 OBESITY, MORBID (MULTI): ICD-10-CM

## 2025-06-25 DIAGNOSIS — J44.1 CHRONIC OBSTRUCTIVE PULMONARY DISEASE WITH ACUTE EXACERBATION (MULTI): Primary | ICD-10-CM

## 2025-06-25 DIAGNOSIS — N18.31 CKD STAGE G3A/A1, GFR 45-59 AND ALBUMIN CREATININE RATIO <30 MG/G (MULTI): ICD-10-CM

## 2025-06-25 DIAGNOSIS — I10 PRIMARY HYPERTENSION: ICD-10-CM

## 2025-06-25 DIAGNOSIS — R53.81 PHYSICAL DEBILITY: ICD-10-CM

## 2025-06-25 DIAGNOSIS — M17.11 PRIMARY OSTEOARTHRITIS OF RIGHT KNEE: ICD-10-CM

## 2025-06-25 DIAGNOSIS — E55.9 VITAMIN D INSUFFICIENCY: ICD-10-CM

## 2025-06-25 PROCEDURE — 99496 TRANSJ CARE MGMT HIGH F2F 7D: CPT | Performed by: FAMILY MEDICINE

## 2025-06-25 RX ORDER — FLUTICASONE FUROATE, UMECLIDINIUM BROMIDE AND VILANTEROL TRIFENATATE 200; 62.5; 25 UG/1; UG/1; UG/1
1 POWDER RESPIRATORY (INHALATION)
Qty: 2 EACH | Refills: 0 | COMMUNITY
Start: 2025-06-25

## 2025-06-25 NOTE — PROGRESS NOTES
Outreach attempt was made to schedule a follow up visit. This was the first attempt. Contact was not made. Patient has established care with an alternate provider. Care Team updated.      Subjective   Patient ID: Dora Guillermo is a 80 y.o. female who presents for Follow-up (Pt was discharged  from ED on 6/18 pt states today she's feeling light headed.).  History of Present Illness  The patient presents for evaluation of COPD and medication management.    She was initially prescribed antibiotics by a physician, which proved ineffective. A week later, she sought care from a different physician due to persistently low oxygen levels and was diagnosed with pleural effusion. She was subsequently hospitalized on Wednesday. During her hospital stay, she was informed that her respiratory issues were likely due to a combination of bronchitis, secondhand smoke exposure, and allergies. The possibility of COPD was also suggested. She was discharged on Saturday afternoon with instructions to start nebulizer treatments, but the equipment was not delivered until Tuesday night. She was unable to receive any treatments for the first four days post-discharge. She is currently not on any antibiotics and does not believe she requires them. She has been managing well without the use of Trelegy. She has been using a breathing exercise device to maintain a certain level of lung function and reports improvement. She is currently on prednisone, which she started on 06/22/2025.    She experienced an allergic reaction to oxybutynin (Ditropan), which caused throat burning and difficulty swallowing, leading to an inability to consume certain foods. She had a similar reaction to this medication 25 years ago. She has been taking Myrbetriq, which she reports as effective.    She has discontinued phentermine due to adverse effects on her heart and is now managing her diet independently. She does not have diabetes.  See Above  Review of Systems  12 Systems have been reviewed as follows.  Constitutional: Fever, weight gain, weight loss, appetite change, night sweats, fatigue, chills.  Eyes : blurry, double vision, vision, loss,  "tearing, redness, pain, sensitivity to light, glaucoma.  Ears, nose, mouth, and throat: Hearing loss, ringing in the ears, ear pain, nasal congestion, nasal drainage, nosebleeds, mouth, throat, irritation tooth problem.  Cardiovascular :chest pain, pressure, heart racing, palpitations, sweating, leg swelling, high or low blood pressure  Pulmonary: Cough, yellow or green sputum, blood and sputum, shortness of breath, wheezing  Gastrointestinal: Nausea, vomiting, diarrhea, constipation, pain, blood in stool, or vomitus, heartburn, difficulty swallowing  Genitourinary: incontinence, abnormal bleeding, abnormal discharge, urinary frequency, urinary hesitancy, pain, impotence sexual problem, infection, urinary retention  Musculoskeletal: Pain, stiffness, joint, redness or warmth, arthritis, back pain, weakness, muscle wasting, sprain or fracture  Neuro: Weight weakness, dizziness, change in voice, change in taste change in vision, change in hearing, loss, or change of sensation, trouble walking, balance problems coordination problems, shaking, speech problem  Endocrine , cold or heat intolerance, blood sugar problem, weight gain or loss missed periods hot flashes, sweats, change in body hair, change in libido, increased thirst, increased urination  Heme/lymph: Swelling, bleeding, problem anemia, bruising, enlarged lymph nodes  Allergic/immunologic: H. plus nasal drip, watery itchy eyes, nasal drainage, immunosuppressed  The above were reviewed and noted negative except as noted in HPI and Problem List.    Objective     /72 (BP Location: Right arm, Patient Position: Sitting, BP Cuff Size: Large adult)   Pulse 59   Temp 36.3 °C (97.4 °F) (Temporal)   Resp 16   Ht 1.676 m (5' 6\")   Wt 106 kg (234 lb)   SpO2 95%   BMI 37.77 kg/m²      Physical Exam    Constitutional: Well developed, well nourished, alert and in no acute distress   Eyes: Normal external exam. Pupils equally round and reactive to light with normal " accommodation and extraocular movements intact.  Neck: Supple, no lymphadenopathy or masses.   Cardiovascular: Regular rate and rhythm, normal S1 and S2, no murmurs, gallops, or rubs. Radial pulses normal. No peripheral edema.  Pulmonary: No respiratory distress, lungs clear to auscultation bilaterally. No wheezes, rhonchi, rales.  Abdomen: soft,non tender, non distended, without masses or HSM  Skin: Warm, well perfused, normal skin turgor and color.   Neurologic: Cranial nerves II-XII grossly intact.   Psychiatric: Mood calm and affect normal  Musculoskeletal: Moving all extremities without restriction  The above were reviewed and noted negative except as noted in HPI and Problem List.      Results  Labs   - A1c: 05/12/2025, 5.8%         Assessment & Plan  1. Chronic Obstructive Pulmonary Disease (COPD).  - Recently hospitalized due to a COPD exacerbation, likely triggered by secondhand smoke exposure and allergies.  - Prescribed prednisone, which she started on 06/22/2025. Continues using her nebulizer for treatments.  - Prescription for Trelegy, to be administered as one puff daily, provided. Clinical pharmacist will contact her to assist in reducing the cost of this medication.  - Blood work ordered prior to her next visit.    2. Medication Management.  - Reported an allergic reaction to Ditropan, causing burning in her throat and difficulty swallowing. This has been noted in her medical records.  - Will continue taking Myrbetriq for bladder incontinence, as it has been effective for her.    3. Weight management.  - Discontinued phentermine due to adverse effects on her heart. Now managing her diet independently.    Follow-up  - The patient will follow up on 08/12/2025.    Problem List Items Addressed This Visit       CKD stage G3a/A1, GFR 45-59 and albumin creatinine ratio <30 mg/g (Multi)    Relevant Orders    Follow Up In [facility] - PCP - Established    Hyperlipidemia    Relevant Orders    Follow  Up In [facility] - PCP - Established    Hypertension    Relevant Orders    Follow Up In [facility] - PCP - Established    Primary osteoarthritis of right knee    Relevant Orders    Follow Up In [facility] - PCP - Established    Vitamin D insufficiency    Relevant Orders    Follow Up In [facility] - PCP - Established    Physical debility    Relevant Orders    Follow Up In [facility] - PCP - Established    Osteoarthritis of lumbar spine    Relevant Orders    Follow Up In [facility] - PCP - Established    Obesity, morbid (Multi)    Relevant Orders    Follow Up In [facility] - PCP - Established    Chronic obstructive pulmonary disease with acute exacerbation (Multi) - Primary    Relevant Medications    Trelegy Ellipta 200-62.5-25 mcg blister with device    Other Relevant Orders    Referral to Clinical Pharmacy    Follow Up In [facility] - PCP - Established    Morbid obesity  Management is challenging due to intolerance to phentermine and reluctance to try injectables like Ozempic. Prefers lifestyle modifications, including dietary changes, to manage weight. Current plan includes two regular-sized meals a day with balanced nutrition, avoiding sweets, and not overeating at buffets. Stress from 's health issues may impact efforts.  - Reassess weight management in three months.  - Consider Ozempic or Mounjaro if weight loss of 10-15 pounds is not achieved in three months.     Osteoarthritis of lumbar spine  Chronic condition with current management using Naproxen 500 mg twice daily causing adverse effects.  - Reduce Naproxen dosage to 250 mg twice daily.     General Health Maintenance  Recent blood work shows good control of A1c, vitamin D, and cholesterol levels. Kidney function and MCHC slightly off but not concerning. No thyroid issues reported.  - Order blood work for next visit.     Continue current medications and therapy  for chronic medical conditions     RX for rollator given to patient.      Left Shoulder has improved      Consider low back xr next.      thyroid screen once per year      Colonoscopy ordered within normal limits      [facility] & myrbetriq     Pharmacy     Fu 1 months     BW prior see GFR     All BW Q 4 months     BW q 3-4 months  Bassam Zafar MD       This medical note was created with the assistance of artificial intelligence (AI) for documentation purposes. The content has been reviewed and confirmed by the healthcare provider for accuracy and completeness. Patient consented to the use of audio recording and use of AI during their visit.

## 2025-06-29 LAB
ATRIAL RATE: 66 BPM
P AXIS: 73 DEGREES
P OFFSET: 153 MS
P ONSET: 102 MS
PR INTERVAL: 222 MS
Q ONSET: 213 MS
QRS COUNT: 11 BEATS
QRS DURATION: 86 MS
QT INTERVAL: 386 MS
QTC CALCULATION(BAZETT): 404 MS
QTC FREDERICIA: 398 MS
R AXIS: -38 DEGREES
T AXIS: -10 DEGREES
T OFFSET: 406 MS
VENTRICULAR RATE: 66 BPM

## 2025-07-02 ENCOUNTER — PATIENT OUTREACH (OUTPATIENT)
Dept: PRIMARY CARE | Facility: CLINIC | Age: 80
End: 2025-07-02
Payer: COMMERCIAL

## 2025-07-02 NOTE — PROGRESS NOTES
"Confirmation of at least 2 patient identifiers.    Completed telephonic follow-up with patient after recent visit with PCP    Spoke to patient during outreach call.    Patient reports feeling: Improved  Pt states she is doing pretty good and is almost finished with the prednisone. Reports her breathing has been pretty good. She stopped the nebulizer now as she is not \"gurgling anymore\" and she feels like she is on the mend.    Patient has questions or concerns about medications: No    Have all prescribed medications been filled? Yes    Patient has necessary resources to manage their care? Yes    Patient has questions or concerns? No    Next care management follow-up approximately within one month.  Care  information provided to patient.            "

## 2025-07-14 ENCOUNTER — APPOINTMENT (OUTPATIENT)
Dept: PRIMARY CARE | Facility: CLINIC | Age: 80
End: 2025-07-14
Payer: COMMERCIAL

## 2025-07-14 VITALS
DIASTOLIC BLOOD PRESSURE: 68 MMHG | BODY MASS INDEX: 39.37 KG/M2 | HEART RATE: 66 BPM | SYSTOLIC BLOOD PRESSURE: 126 MMHG | OXYGEN SATURATION: 96 % | HEIGHT: 66 IN | WEIGHT: 245 LBS

## 2025-07-14 DIAGNOSIS — Z01.20 ENCOUNTER FOR DENTAL EXAMINATION AND CLEANING WITHOUT ABNORMAL FINDINGS: ICD-10-CM

## 2025-07-14 DIAGNOSIS — N28.9 RENAL INSUFFICIENCY: ICD-10-CM

## 2025-07-14 DIAGNOSIS — M62.81 MUSCLE WEAKNESS: Primary | ICD-10-CM

## 2025-07-14 DIAGNOSIS — E55.9 VITAMIN D DEFICIENCY: ICD-10-CM

## 2025-07-14 DIAGNOSIS — R73.9 HYPERGLYCEMIA: ICD-10-CM

## 2025-07-14 DIAGNOSIS — E78.2 MIXED HYPERLIPIDEMIA: ICD-10-CM

## 2025-07-14 PROCEDURE — 3078F DIAST BP <80 MM HG: CPT | Performed by: FAMILY MEDICINE

## 2025-07-14 PROCEDURE — 1111F DSCHRG MED/CURRENT MED MERGE: CPT | Performed by: FAMILY MEDICINE

## 2025-07-14 PROCEDURE — 3074F SYST BP LT 130 MM HG: CPT | Performed by: FAMILY MEDICINE

## 2025-07-14 PROCEDURE — 99213 OFFICE O/P EST LOW 20 MIN: CPT | Performed by: FAMILY MEDICINE

## 2025-07-14 PROCEDURE — 1036F TOBACCO NON-USER: CPT | Performed by: FAMILY MEDICINE

## 2025-07-14 PROCEDURE — 1159F MED LIST DOCD IN RCRD: CPT | Performed by: FAMILY MEDICINE

## 2025-07-14 RX ORDER — AMOXICILLIN 875 MG/1
875 TABLET, COATED ORAL 2 TIMES DAILY
Qty: 20 TABLET | Refills: 0 | Status: SHIPPED | OUTPATIENT
Start: 2025-07-14 | End: 2025-07-24

## 2025-07-14 NOTE — PROGRESS NOTES
Discharge Planning Assessment   [facility]     Patient Name: Tahira Zimmerman  MRN: [id number]  Today's Date: 10/1/2020    Admit Date: 9/30/2020    Discharge Needs Assessment     Row Name 10/01/20 1231       Living Environment    Lives With  child(quinn), adult Spoke to patient in room with mask and goggles maintaining 6 ft distance  - Patient states  her daughter lives with her    Current Living Arrangements  home/apartment/condo    Primary Care Provided by  self    Able to Return to Prior Arrangements  -- Pending PT and OT [facility]       Resource/Environmental Concerns    Resource/Environmental Concerns  none    Transportation Concerns  car, none       Transition Planning    Patient/Family Anticipates Transition to  home with family    Patient/Family Anticipated Services at Transition  none    Transportation Anticipated  family or friend will provide;car, drives self       Discharge Needs Assessment    Equipment Currently Used at Home  cane, straight        Discharge Plan     Row Name 10/01/20 1235       Plan    Plan  Pending PT and OT [facility]        Continued Care and Services - Admitted Since 9/30/2020    Confirmed PCP and Pharmacy      Demographic Summary     Row Name 10/01/20 1230       General Information    Admission Type  observation    Arrived From  emergency department    Required Notices Provided  Observation Status Notice    Referral Source  admission list    Reason for Consult  discharge planning    Preferred Language  English        Functional Status     Row Name 10/01/20 1231       Functional Status, IADL    Medications  independent    Meal Preparation  independent    Housekeeping  independent    Laundry  independent    Shopping  independent            Patient Forms     Row Name 10/01/20 1230       Patient Forms    Patient Observation Letter  Delivered [place] 10/01/2020    Delivered to  Patient    Method of delivery  In person           DC Barriers - Pending Neuro surgery Consult, Elevated INR, Monitor H & H  Subjective   Patient ID: Dora Guillermo is a 80 y.o. female who presents for No chief complaint on file..  HPI    Patient presents in the office today to establish care. Patient does not go to  anymore due to the waiting time and repetitive questions. Patient heard about the practice through family.     Patient states the doctor was trying to get him on trellegy but the doctor at the hospital didn't want her on any oxygen or inhalers. Patient states they wanted her to to start a weight loss medication. Pt tried adipex but had a bad reaction to it. Pt states she would nabila to lose weight herself.     Review of Systems  Constitutional:  no chills, no fever and no night sweats.  Eyes: no blurred vision and no eyesight problems.  ENT: no hearing loss, no nasal congestion, no hoarseness and no sore throat.  Neck: no mass (es) and no swelling.  Cardiovascular: no chest pain, no intermittent leg claudication, no lower extremity edema, no palpitation and no syncope.  Respiratory: no cough, no shortness of breath during exertion, no shortness of breath at rest and no wheezing.  Gastrointestinal: no abdominal pain, no blood in stools, no constipation, no diarrhea, no melena, no nausea, no rectal pain and no vomiting.  Genitourinary: no dysuria, no change in urinary frequency, no urinary hesitancy and no feelings of urinary urgency.  Musculoskeletal: no arthralgias, no back pain and no myalgias.  Integumentary: no new skin lesions and no rashes.  Neurological: no difficulty walking, no headache, no limb weakness, no numbness and no tingling.  Psychiatric/Behavioral: no anxiety, no depression, no anhedonia and no substance use disorders.  Endocrine: no recent weight gain and no recent weight loss.  Hematologic/Lymphatic: no tendency for easy bruising and no swollen glands    Objective   Physical Exam  Patient in for follow-up chronic medical problems changing from [facility] office to here due to excessive wait times.  Overall      Sandra Canales RN, CM  Office Phone [phone]  Cell [phone]     doing well.'s has multiple medical problems just got released from rehab she is had some weakness and dizziness with head try to do physical therapy in the past but became too expensive to do that she has not done PT in a while we will see if she can get coverage through insurance at this point denies chest pain or shortness of breath.  Obese female in no acute distress lungs clear cardiac and abdominal exams benign no peripheral edema.  Intermittent respiratory distress with illness never smoked history of overactive bladder hypertension hyperlipidemia all stable at present.  Osteoarthritis in general joints.  There were no vitals taken for this visit.    Lab Results   Component Value Date    WBC 14.9 (H) 06/21/2025    HGB 13.3 06/21/2025    HCT 41.8 06/21/2025    MCV 88 06/21/2025     06/21/2025       Assessment/Plan plan continue current treatment course refill medication follow-up in 6 months or as needed  Problem List Items Addressed This Visit    None

## 2025-07-16 ENCOUNTER — TELEPHONE (OUTPATIENT)
Dept: PRIMARY CARE | Facility: CLINIC | Age: 80
End: 2025-07-16
Payer: COMMERCIAL

## 2025-07-16 NOTE — TELEPHONE ENCOUNTER
PATIENT IS CALLING - SAW YOU ON 7/14/25 - PREVIOUS A PATIENT OF DR. QUINTANA - SHE WAS LOOKING OVER HER AFTER VISIT SUMMARY - NOTICED THAT YOU  PUT IN THERE THAT SHE HAS DIABETES - SHE STATES THAT SHE HAS NEVER HAD DIABETES, NO ONE HAS EVERY TOLD HER SHE HAD DIABETES - SHE IS ALSO ON FARXIGA - THOUGHT SHE WAS ON THIS MEDICATION FOR HER KIDNEY'S  - PATIENT IS WONDERING IF YOU CAN LOOK OVER HER CHART AGAIN AND LET US KNOW IF SHE STILL NEEDS TO TAKE THE FARXIGA AND IF SHE REALLY IS A DIABETIC?  PLEASE ADVISE.

## 2025-07-16 NOTE — TELEPHONE ENCOUNTER
Philip Vernon MD to [id number] (Selected Message)        7/16/25  4:47 PM  Error corrected

## 2025-08-01 ENCOUNTER — PATIENT OUTREACH (OUTPATIENT)
Dept: PRIMARY CARE | Facility: CLINIC | Age: 80
End: 2025-08-01
Payer: COMMERCIAL

## 2025-08-01 NOTE — PROGRESS NOTES
Successful outreach to patient regarding hospitalization as patient continues TCM program.   At time of outreach call the patient feels as if their condition has improved since initial visit with PCP or specialist. States the COPD seems fine; has not had difficulty breathing or SOB. States she stopped the farxiga after calling the office but was not sure that she should have even been on it be on it in the first place. Reports she now has some swelling in her feet and ankles and she is not sure if she should restart it.. Denies warmth or redness and it is not up her legs; just her ankles. States she is very tired if she walks long distances and feels she can't carry anything like she used to. States she is going to start therapy soon and hopes it will help get some strength back. Assisted to schedule follow up with PCP for Monday. Aware to return to ED for new/worsening symptoms, increased swelling, difficulty breathing, SOB.  Questions or concerns addressed at this time with patient.   Provided contact information to patient if any further non-emergent needs arise.

## 2025-08-04 ENCOUNTER — APPOINTMENT (OUTPATIENT)
Dept: PRIMARY CARE | Facility: CLINIC | Age: 80
End: 2025-08-04
Payer: COMMERCIAL

## 2025-08-04 VITALS
TEMPERATURE: 97.3 F | SYSTOLIC BLOOD PRESSURE: 130 MMHG | HEIGHT: 66 IN | WEIGHT: 246.8 LBS | OXYGEN SATURATION: 94 % | BODY MASS INDEX: 39.66 KG/M2 | HEART RATE: 64 BPM | DIASTOLIC BLOOD PRESSURE: 78 MMHG

## 2025-08-04 DIAGNOSIS — N32.89 BLADDER SPASM: ICD-10-CM

## 2025-08-04 DIAGNOSIS — R73.9 HYPERGLYCEMIA: ICD-10-CM

## 2025-08-04 DIAGNOSIS — E66.812 CLASS 2 OBESITY WITH BODY MASS INDEX (BMI) OF 39.0 TO 39.9 IN ADULT, UNSPECIFIED OBESITY TYPE, UNSPECIFIED WHETHER SERIOUS COMORBIDITY PRESENT: ICD-10-CM

## 2025-08-04 DIAGNOSIS — E78.2 MIXED HYPERLIPIDEMIA: Primary | ICD-10-CM

## 2025-08-04 DIAGNOSIS — N18.31 CKD STAGE G3A/A1, GFR 45-59 AND ALBUMIN CREATININE RATIO <30 MG/G (MULTI): ICD-10-CM

## 2025-08-04 PROCEDURE — 3078F DIAST BP <80 MM HG: CPT | Performed by: FAMILY MEDICINE

## 2025-08-04 PROCEDURE — 99213 OFFICE O/P EST LOW 20 MIN: CPT | Performed by: FAMILY MEDICINE

## 2025-08-04 PROCEDURE — 3075F SYST BP GE 130 - 139MM HG: CPT | Performed by: FAMILY MEDICINE

## 2025-08-04 PROCEDURE — 1159F MED LIST DOCD IN RCRD: CPT | Performed by: FAMILY MEDICINE

## 2025-08-04 PROCEDURE — 1036F TOBACCO NON-USER: CPT | Performed by: FAMILY MEDICINE

## 2025-08-04 RX ORDER — MIRABEGRON 25 MG/1
25 TABLET, FILM COATED, EXTENDED RELEASE ORAL DAILY
Qty: 90 TABLET | Refills: 3 | Status: SHIPPED | OUTPATIENT
Start: 2025-08-04

## 2025-08-04 RX ORDER — DAPAGLIFLOZIN 5 MG/1
5 TABLET, FILM COATED ORAL DAILY
Qty: 90 TABLET | Refills: 3 | Status: SHIPPED | OUTPATIENT
Start: 2025-08-04 | End: 2026-08-04

## 2025-08-04 ASSESSMENT — ENCOUNTER SYMPTOMS: DEPRESSION: 0

## 2025-08-04 ASSESSMENT — PATIENT HEALTH QUESTIONNAIRE - PHQ9
2. FEELING DOWN, DEPRESSED OR HOPELESS: NOT AT ALL
SUM OF ALL RESPONSES TO PHQ9 QUESTIONS 1 AND 2: 0
1. LITTLE INTEREST OR PLEASURE IN DOING THINGS: NOT AT ALL

## 2025-08-04 NOTE — PROGRESS NOTES
Subjective   Patient ID: Dora Guillermo is a 80 y.o. female who presents for Foot Swelling, Joint Swelling, and Medication Review.  HPI    Patient presents in office for foot and ankle swelling. Patient states she isn't sure what's causing it. She is not taking any new medication or anything new at home.  Ongoing x 2 weeks. Patient has tried elevating the feet.     Patient would like to discuss Farxiga. Patient states she doesn't understand why she is taking the medication because she is not a diabetic. She stopped taking for about a week then started back up again.     Patient starts PT tomorrow. They have her doing once a week for 5 weeks.     Review of Systems  Constitutional:  no chills, no fever and no night sweats.  Eyes: no blurred vision and no eyesight problems.  ENT: no hearing loss, no nasal congestion, no hoarseness and no sore throat.  Neck: no mass (es) and no swelling.  Cardiovascular: no chest pain, no intermittent leg claudication, no lower extremity edema, no palpitation and no syncope.  Respiratory: no cough, no shortness of breath during exertion, no shortness of breath at rest and no wheezing.  Gastrointestinal: no abdominal pain, no blood in stools, no constipation, no diarrhea, no melena, no nausea, no rectal pain and no vomiting.  Genitourinary: no dysuria, no change in urinary frequency, no urinary hesitancy and no feelings of urinary urgency.  Musculoskeletal: no arthralgias, no back pain and no myalgias.  Integumentary: no new skin lesions and no rashes.  Neurological: no difficulty walking, no headache, no limb weakness, no numbness and no tingling.  Psychiatric/Behavioral: no anxiety, no depression, no anhedonia and no substance use disorders.  Endocrine: no recent weight gain and no recent weight loss.  Hematologic/Lymphatic: no tendency for easy bruising and no swollen glands    Objective   Physical Exam  Patient in today for follow-up chronic problems wants to know why she is on  "Farxiga basically is cardio cardiac disease she has renal dysfunction and hyperglycemia.  The last A1c was 5.8 she is not sure if she was taking the Farxiga at that point recommend she stay on it we will get blood drawn again at the end of the year.  Obese female in no acute distress lungs clear cardiac and abdominal exams are benign.  She has some trace edema she does elevate her feet when she is home watching TV.  Encouraged her to continue to do that.  /78 (BP Location: Left arm, Patient Position: Sitting, BP Cuff Size: Adult)   Pulse 64   Temp 36.3 °C (97.3 °F) (Temporal)   Ht 1.676 m (5' 6\")   Wt 112 kg (246 lb 12.8 oz)   SpO2 94%   BMI 39.83 kg/m²     Lab Results   Component Value Date    WBC 14.9 (H) 06/21/2025    HGB 13.3 06/21/2025    HCT 41.8 06/21/2025    MCV 88 06/21/2025     06/21/2025       Assessment/Plan continue current treatment course follow-up in 4 months.  Blood was ordered and she will get it drawn before the visit.  Problem List Items Addressed This Visit          Genitourinary and Reproductive    CKD stage G3a/A1, GFR 45-59 and albumin creatinine ratio <30 mg/g (Multi)     Other Visit Diagnoses         BMI 39.0-39.9,adult          Class 2 obesity with body mass index (BMI) of 39.0 to 39.9 in adult, unspecified obesity type, unspecified whether serious comorbidity present              "

## 2025-08-05 ENCOUNTER — EVALUATION (OUTPATIENT)
Dept: PHYSICAL THERAPY | Facility: CLINIC | Age: 80
End: 2025-08-05
Payer: COMMERCIAL

## 2025-08-05 DIAGNOSIS — R26.81 UNSTEADY GAIT: ICD-10-CM

## 2025-08-05 DIAGNOSIS — G89.29 CHRONIC LOW BACK PAIN WITHOUT SCIATICA: ICD-10-CM

## 2025-08-05 DIAGNOSIS — M54.50 CHRONIC LOW BACK PAIN WITHOUT SCIATICA: ICD-10-CM

## 2025-08-05 DIAGNOSIS — G89.29 CHRONIC LEFT-SIDED LOW BACK PAIN WITHOUT SCIATICA: ICD-10-CM

## 2025-08-05 DIAGNOSIS — M54.50 CHRONIC LEFT-SIDED LOW BACK PAIN WITHOUT SCIATICA: ICD-10-CM

## 2025-08-05 DIAGNOSIS — M62.81 MUSCLE WEAKNESS (GENERALIZED): Primary | ICD-10-CM

## 2025-08-05 PROCEDURE — 97161 PT EVAL LOW COMPLEX 20 MIN: CPT | Mod: GP

## 2025-08-05 PROCEDURE — 97535 SELF CARE MNGMENT TRAINING: CPT | Mod: GP

## 2025-08-05 PROCEDURE — 97110 THERAPEUTIC EXERCISES: CPT | Mod: GP

## 2025-08-05 ASSESSMENT — ENCOUNTER SYMPTOMS
OCCASIONAL FEELINGS OF UNSTEADINESS: 1
DEPRESSION: 0
LOSS OF SENSATION IN FEET: 0

## 2025-08-05 NOTE — PROGRESS NOTES
"Patient Name: Dora Guillermo  MRN: [id number]  Today's Date: 8/5/2025  Visit #1  Time Calculation  Start Time: 1545  Stop Time: 1638  Time Calculation (min): 53 min  2025 // 0% coins, no ded, $5300 oop ($1617.42 met), $50 copay, bmn chrissy yr, no PA per [employer] (ref# [id number]).    [employer] PPO  20560 - 20561 not covered.  Telehealth visits included with service.    Referring Provider: Philip Vernon MD    Subjective:  Chief Complaint: Pt complains of chronic low back pain, primarily localized to the left lumbar region and sometimes radiating into the left hip. She denies any current numbness or tingling in the LE. She also complains of generalized lower extremity weakness, which worsens with prolonged standing or sitting. She states that she has difficulty standing for prolonged periods, such as while cooking meals, due to back and leg fatigue. Requires frequent rest breaks during household tasks.   Onset Date: Years ago  Pain intensity at Best: 0/10  Pain intensity at Worst: 6-8/10   Aggravating factors: Prolonged standing, walking  Easing factors: Aleve, heating pad  PLOF: Independent with all ADLS  Current Activity Status: Chair exercises  PMH Prevalent to episode: Mixed hyperlipidemia, 1980 lumbar sx, R TKA  Occupation: Retired  Sleep Status: Sleeps in recliner, 7-8 hours per night   Home Setup: ramp into house, 3 stairs 1 handrail, walk-in shower, grab bars, Rolator   Falls in Past 6 months: Multiple- positional changes lead to imbalance  Therapy Goals: \"be stronger\"     Objective:  Measurement and OM  *Several tests and measures were unable to be completed due to intolerance of position    Posture and Observation:  Pt demonstrates increased lumbar lordosis with mild anterior pelvic tilt    Palpation and Joint Mobility Testing:   TTP L lumbar paraspinals, decreased tissue extensibility    Functional Assessment:  Static Balance: 30s, WFL  Tandem Balance: UE use B 15s  Gait: Wide MARYBETH, slight " trendelenburg noted on L  Stairs: non-reciprocal pattern  5x STS: 33s    Lumbar ROM  Flexion: Full   Extension: 50%  Side bend (L/R): 50% *Pain/50%   Rotation (L/R): 25%/25%    Hip ROM (L/R)  Flexion: 90°/90°  External Rotation: 38°/35°  Internal rotation: 20°/15°    LQ Neuro Screen:  Myotomes:  L2 Hip Flexion: 3+/5, 4-/5  L3 Knee Extension: 4/5, 4/5  L4 Ankle Dorsiflexion: 4+/5, 5/5  L5 Great Toe Extension: NT    Special Tests  SLR: Negative  Shopping Cart Sign: Positive    Treatment:  PT Evaluation Time Entry  PT Evaluation (Low) Time Entry: 25  PT Therapeutic Procedures Time Entry  Therapeutic Exercise Time Entry: 18  Self-Care/Home Mgmt Training: 10  HEP Initiated and Reviewed  Access Code: [email or web address]  Exercises  - Seated Hip Adduction Isometrics with Ball  - 2 x daily - 7 x weekly - 1 sets - 10 reps - 5s hold  - Seated Posterior Pelvic Tilt  - 2 x daily - 7 x weekly - 1 sets - 10 reps - 5s hold  - Seated March  - 2 x daily - 7 x weekly - 1 sets - 10 reps  Education regarding prevalent anatomy, activity modifications, and prognosis     Assessment:   Dora presents with chronic low back pain and LE muscle weakness that is affecting balance and walking tolerance. Tests and measures indicate decreased LE strength bilaterally as well as reduced mobility of the hips, and poor balance, likely contributing to recent falls. Pt would benefit from skilled PT intervention to address deficits and return to prior functional levels. Pt treatment includes: manual techniques to decrease pain and improve joint/soft-tissue mobility, as well as exercises to increase strength, endurance, and neuromotor control.      This patient's clinical presentation demonstrates minimal factors influencing daily activities, indicating a low complexity. There are no significant comorbidities that complicate the desired treatment plan.  PT services are warranted in order to account measurable change in the above outcome measures and achieve  improvements in the patient's functional status as well as prevention of future episodes.     Plan:   Planned interventions include: education/instruction, manual therapy, neuromuscular re-education, self care/home management, therapeutic activities and therapeutic exercises.   Potential to achieve rehab goals: Fair-good    POC: 1-2x per week/ x20 visits    Goals:   Pt will demonstrate compliance and independence to home exercise program.   Pt will improve tandem balance to >15s B without UE use to improve balance and reduce risk of falls.   Pt will increase hip flexor strength B to >4/5 to improve mechanics with transfers.   Pt will decrease score of Modified DAYDAY by > 6 points to meet the MCID.  Pt will decrease time on 5x sit to stand test by > 2.3 sec to meet the MCID.  Pt will ascend / descend stairs with reciprocal pattern without an increase in symptoms.  Pt will ambulate 3/4 mile without rest breaks to improve walking endurance.     Kandis Rodriguez PT, DPT

## 2025-08-11 ENCOUNTER — TREATMENT (OUTPATIENT)
Dept: PHYSICAL THERAPY | Facility: CLINIC | Age: 80
End: 2025-08-11
Payer: COMMERCIAL

## 2025-08-11 DIAGNOSIS — M62.81 MUSCLE WEAKNESS (GENERALIZED): ICD-10-CM

## 2025-08-11 DIAGNOSIS — G89.29 CHRONIC MIDLINE LOW BACK PAIN WITHOUT SCIATICA: Primary | ICD-10-CM

## 2025-08-11 DIAGNOSIS — G89.29 CHRONIC LOW BACK PAIN WITHOUT SCIATICA: ICD-10-CM

## 2025-08-11 DIAGNOSIS — R26.81 UNSTEADY GAIT: ICD-10-CM

## 2025-08-11 DIAGNOSIS — M54.50 CHRONIC LOW BACK PAIN WITHOUT SCIATICA: ICD-10-CM

## 2025-08-11 DIAGNOSIS — M54.50 CHRONIC MIDLINE LOW BACK PAIN WITHOUT SCIATICA: Primary | ICD-10-CM

## 2025-08-11 PROCEDURE — 97110 THERAPEUTIC EXERCISES: CPT | Mod: GP

## 2025-08-11 PROCEDURE — 97112 NEUROMUSCULAR REEDUCATION: CPT | Mod: GP

## 2025-08-12 ENCOUNTER — APPOINTMENT (OUTPATIENT)
Dept: PRIMARY CARE | Facility: CLINIC | Age: 80
End: 2025-08-12
Payer: COMMERCIAL

## 2025-08-18 ENCOUNTER — TREATMENT (OUTPATIENT)
Dept: PHYSICAL THERAPY | Facility: CLINIC | Age: 80
End: 2025-08-18
Payer: COMMERCIAL

## 2025-08-18 DIAGNOSIS — G89.29 CHRONIC BILATERAL LOW BACK PAIN WITHOUT SCIATICA: Primary | ICD-10-CM

## 2025-08-18 DIAGNOSIS — M54.50 CHRONIC LOW BACK PAIN WITHOUT SCIATICA: ICD-10-CM

## 2025-08-18 DIAGNOSIS — M54.50 CHRONIC BILATERAL LOW BACK PAIN WITHOUT SCIATICA: Primary | ICD-10-CM

## 2025-08-18 DIAGNOSIS — G89.29 CHRONIC LOW BACK PAIN WITHOUT SCIATICA: ICD-10-CM

## 2025-08-18 DIAGNOSIS — R26.81 UNSTEADY GAIT: ICD-10-CM

## 2025-08-18 DIAGNOSIS — M62.81 MUSCLE WEAKNESS (GENERALIZED): ICD-10-CM

## 2025-08-18 PROCEDURE — 97112 NEUROMUSCULAR REEDUCATION: CPT | Mod: GP

## 2025-08-18 PROCEDURE — 97110 THERAPEUTIC EXERCISES: CPT | Mod: GP

## 2025-08-25 ENCOUNTER — TREATMENT (OUTPATIENT)
Dept: PHYSICAL THERAPY | Facility: CLINIC | Age: 80
End: 2025-08-25
Payer: COMMERCIAL

## 2025-08-25 DIAGNOSIS — R26.81 UNSTEADY GAIT: ICD-10-CM

## 2025-08-25 DIAGNOSIS — G89.29 CHRONIC LOW BACK PAIN WITHOUT SCIATICA: ICD-10-CM

## 2025-08-25 DIAGNOSIS — M54.50 CHRONIC BILATERAL LOW BACK PAIN WITHOUT SCIATICA: Primary | ICD-10-CM

## 2025-08-25 DIAGNOSIS — M54.50 CHRONIC LOW BACK PAIN WITHOUT SCIATICA: ICD-10-CM

## 2025-08-25 DIAGNOSIS — M62.81 MUSCLE WEAKNESS (GENERALIZED): ICD-10-CM

## 2025-08-25 DIAGNOSIS — G89.29 CHRONIC BILATERAL LOW BACK PAIN WITHOUT SCIATICA: Primary | ICD-10-CM

## 2025-08-25 PROCEDURE — 97110 THERAPEUTIC EXERCISES: CPT | Mod: GP

## 2025-08-25 PROCEDURE — 97112 NEUROMUSCULAR REEDUCATION: CPT | Mod: GP

## 2025-09-02 ENCOUNTER — TREATMENT (OUTPATIENT)
Dept: PHYSICAL THERAPY | Facility: CLINIC | Age: 80
End: 2025-09-02
Payer: COMMERCIAL

## 2025-09-02 DIAGNOSIS — M62.81 MUSCLE WEAKNESS (GENERALIZED): ICD-10-CM

## 2025-09-02 DIAGNOSIS — M54.50 CHRONIC LOW BACK PAIN WITHOUT SCIATICA: ICD-10-CM

## 2025-09-02 DIAGNOSIS — R26.81 UNSTEADY GAIT: ICD-10-CM

## 2025-09-02 DIAGNOSIS — G89.29 CHRONIC BILATERAL LOW BACK PAIN WITHOUT SCIATICA: Primary | ICD-10-CM

## 2025-09-02 DIAGNOSIS — M54.50 CHRONIC BILATERAL LOW BACK PAIN WITHOUT SCIATICA: Primary | ICD-10-CM

## 2025-09-02 DIAGNOSIS — G89.29 CHRONIC LOW BACK PAIN WITHOUT SCIATICA: ICD-10-CM

## 2025-09-02 PROCEDURE — 97110 THERAPEUTIC EXERCISES: CPT | Mod: GP

## 2025-09-02 PROCEDURE — 97112 NEUROMUSCULAR REEDUCATION: CPT | Mod: GP

## 2025-12-09 ENCOUNTER — APPOINTMENT (OUTPATIENT)
Dept: PRIMARY CARE | Facility: CLINIC | Age: 80
End: 2025-12-09
Payer: COMMERCIAL

## 2026-01-12 ENCOUNTER — APPOINTMENT (OUTPATIENT)
Dept: PRIMARY CARE | Facility: CLINIC | Age: 81
End: 2026-01-12
Payer: COMMERCIAL